# Patient Record
Sex: MALE | Race: BLACK OR AFRICAN AMERICAN | Employment: OTHER | URBAN - METROPOLITAN AREA
[De-identification: names, ages, dates, MRNs, and addresses within clinical notes are randomized per-mention and may not be internally consistent; named-entity substitution may affect disease eponyms.]

---

## 2020-02-26 RX ORDER — NITROGLYCERIN 0.4 MG/1
TABLET SUBLINGUAL PRN
Refills: 0 | COMMUNITY
Start: 2019-11-27

## 2020-02-26 RX ORDER — HYDRALAZINE HYDROCHLORIDE 50 MG/1
TABLET, FILM COATED ORAL 3 TIMES DAILY
Refills: 0 | COMMUNITY
Start: 2019-11-29 | End: 2021-11-17

## 2020-02-26 RX ORDER — LIDOCAINE AND PRILOCAINE 25; 25 MG/G; MG/G
CREAM TOPICAL PRN
COMMUNITY
Start: 2016-09-22

## 2020-02-26 NOTE — PROGRESS NOTES
[address]  time___0830 per pt_________        Surgery time__1000__________    Take the following medications with a sip of water: Follow your MD/Surgeons pre-procedure instructions regarding your medications    Do not eat or drink anything after 12:00 midnight  except for your prep prior to your surgery. This includes water chewing gum, mints and ice chips. You may brush your teeth and gargle the morning of your surgery, but do not swallow the water     Please see your family doctor/pediatrician for a history and physical and/or concerning medications. Bring any test results/reports from your physicians office. If you are under the care of a heart doctor or specialist doctor, please be aware that you may be asked to them for clearance    You may be asked to stop blood thinners such as Coumadin, Plavix, Fragmin, Lovenox, etc., or any anti-inflammatories such as:  Aspirin, Ibuprofen, Advil, Naproxen prior to your surgery. We also ask that you stop any OTC medications such as fish oil, vitamin E, glucosamine, garlic, Multivitamins, COQ 10, etc.    We ask that you do not smoke 24 hours prior to surgery  We ask that you do not  drink any alcoholic beverages 24 hours prior to surgery     You must make arrangements for a responsible adult to take you home after your surgery. For your safety you will not be allowed to leave alone or drive yourself home. Your surgery will be cancelled if you do not have a ride home. Also for your safety, it is strongly suggested that someone stay with you the first 24 hours after your surgery. A parent or legal guardian must accompany a child scheduled for surgery and plan to stay at the hospital until the child is discharged. Please do not bring other children with you. For your comfort, please wear simple loose fitting clothing to the hospital.  Please do not bring valuables.     Do not wear any make-up or nail surgery.

## 2020-02-27 RX ORDER — MEGESTROL ACETATE 20 MG/1
20 TABLET ORAL DAILY
COMMUNITY

## 2020-02-27 RX ORDER — ATORVASTATIN CALCIUM 80 MG/1
80 TABLET, FILM COATED ORAL DAILY
COMMUNITY

## 2020-02-27 RX ORDER — CARVEDILOL 25 MG/1
25 TABLET ORAL 2 TIMES DAILY WITH MEALS
Status: ON HOLD | COMMUNITY
End: 2021-11-22 | Stop reason: HOSPADM

## 2020-02-27 RX ORDER — ISOSORBIDE MONONITRATE 30 MG/1
30 TABLET, EXTENDED RELEASE ORAL DAILY
COMMUNITY

## 2020-02-27 RX ORDER — ERYTHROMYCIN 5 MG/G
OINTMENT OPHTHALMIC NIGHTLY
COMMUNITY
End: 2021-11-17

## 2020-02-27 RX ORDER — CLOPIDOGREL BISULFATE 75 MG/1
75 TABLET ORAL DAILY
Status: ON HOLD | COMMUNITY
End: 2021-11-17 | Stop reason: ALTCHOICE

## 2020-02-27 RX ORDER — SPIRONOLACTONE 25 MG/1
25 TABLET ORAL DAILY
COMMUNITY

## 2020-02-27 NOTE — PROGRESS NOTES
Completed the pre-procedure interview, verified the patient's current medications.  Electronically signed by Melva Batres RN on 2/27/20 at 4:23 PM

## 2020-02-28 ENCOUNTER — ANESTHESIA EVENT (OUTPATIENT)
Dept: ENDOSCOPY | Age: 55
End: 2020-02-28
Payer: MEDICARE

## 2020-03-02 ENCOUNTER — ANESTHESIA (OUTPATIENT)
Dept: ENDOSCOPY | Age: 55
End: 2020-03-02
Payer: MEDICARE

## 2020-03-02 ENCOUNTER — HOSPITAL ENCOUNTER (OUTPATIENT)
Age: 55
Setting detail: OUTPATIENT SURGERY
Discharge: HOME | End: 2020-03-02
Attending: INTERNAL MEDICINE | Admitting: INTERNAL MEDICINE
Payer: MEDICARE

## 2020-03-02 VITALS
HEIGHT: 74 IN | WEIGHT: 137.68 LBS | OXYGEN SATURATION: 96 % | SYSTOLIC BLOOD PRESSURE: 152 MMHG | BODY MASS INDEX: 17.67 KG/M2 | HEART RATE: 78 BPM | DIASTOLIC BLOOD PRESSURE: 78 MMHG | TEMPERATURE: 97 F | RESPIRATION RATE: 18 BRPM

## 2020-03-02 VITALS
SYSTOLIC BLOOD PRESSURE: 115 MMHG | DIASTOLIC BLOOD PRESSURE: 63 MMHG | OXYGEN SATURATION: 100 % | RESPIRATION RATE: 16 BRPM

## 2020-03-02 LAB
ANION GAP SERPL CALCULATED.3IONS-SCNC: 17 MMOL/L (ref 3–16)
BUN BLDV-MCNC: 20 MG/DL (ref 7–20)
CALCIUM SERPL-MCNC: 9.8 MG/DL (ref 8.3–10.6)
CHLORIDE BLD-SCNC: 92 MMOL/L (ref 99–110)
CO2: 28 MMOL/L (ref 21–32)
CREAT SERPL-MCNC: 8.6 MG/DL (ref 0.9–1.3)
GFR AFRICAN AMERICAN: 8
GFR NON-AFRICAN AMERICAN: 6
GLUCOSE BLD-MCNC: 66 MG/DL (ref 70–99)
POTASSIUM REFLEX MAGNESIUM: 4.1 MMOL/L (ref 3.5–5.1)
SODIUM BLD-SCNC: 137 MMOL/L (ref 136–145)

## 2020-03-02 PROCEDURE — 88305 TISSUE EXAM BY PATHOLOGIST: CPT

## 2020-03-02 PROCEDURE — 2500000003 HC RX 250 WO HCPCS: Performed by: NURSE ANESTHETIST, CERTIFIED REGISTERED

## 2020-03-02 PROCEDURE — 6360000002 HC RX W HCPCS: Performed by: NURSE ANESTHETIST, CERTIFIED REGISTERED

## 2020-03-02 PROCEDURE — 7100000001 HC PACU RECOVERY - ADDTL 15 MIN: Performed by: INTERNAL MEDICINE

## 2020-03-02 PROCEDURE — 80048 BASIC METABOLIC PNL TOTAL CA: CPT

## 2020-03-02 PROCEDURE — 2709999900 HC NON-CHARGEABLE SUPPLY: Performed by: INTERNAL MEDICINE

## 2020-03-02 PROCEDURE — 36415 COLL VENOUS BLD VENIPUNCTURE: CPT

## 2020-03-02 PROCEDURE — 3700000001 HC ADD 15 MINUTES (ANESTHESIA): Performed by: INTERNAL MEDICINE

## 2020-03-02 PROCEDURE — 2580000003 HC RX 258: Performed by: ANESTHESIOLOGY

## 2020-03-02 PROCEDURE — 7100000000 HC PACU RECOVERY - FIRST 15 MIN: Performed by: INTERNAL MEDICINE

## 2020-03-02 PROCEDURE — 3700000000 HC ANESTHESIA ATTENDED CARE: Performed by: INTERNAL MEDICINE

## 2020-03-02 PROCEDURE — 7100000010 HC PHASE II RECOVERY - FIRST 15 MIN: Performed by: INTERNAL MEDICINE

## 2020-03-02 PROCEDURE — 7100000011 HC PHASE II RECOVERY - ADDTL 15 MIN: Performed by: INTERNAL MEDICINE

## 2020-03-02 PROCEDURE — 3609010600 HC COLONOSCOPY POLYPECTOMY SNARE/COLD BIOPSY: Performed by: INTERNAL MEDICINE

## 2020-03-02 RX ORDER — SODIUM CHLORIDE 0.9 % (FLUSH) 0.9 %
10 SYRINGE (ML) INJECTION PRN
Status: DISCONTINUED | OUTPATIENT
Start: 2020-03-02 | End: 2020-03-02 | Stop reason: HOSPADM

## 2020-03-02 RX ORDER — MORPHINE SULFATE 2 MG/ML
1 INJECTION, SOLUTION INTRAMUSCULAR; INTRAVENOUS EVERY 5 MIN PRN
Status: DISCONTINUED | OUTPATIENT
Start: 2020-03-02 | End: 2020-03-02 | Stop reason: HOSPADM

## 2020-03-02 RX ORDER — OXYCODONE HYDROCHLORIDE AND ACETAMINOPHEN 5; 325 MG/1; MG/1
1 TABLET ORAL PRN
Status: DISCONTINUED | OUTPATIENT
Start: 2020-03-02 | End: 2020-03-02 | Stop reason: HOSPADM

## 2020-03-02 RX ORDER — LIDOCAINE HYDROCHLORIDE 20 MG/ML
INJECTION, SOLUTION INFILTRATION; PERINEURAL PRN
Status: DISCONTINUED | OUTPATIENT
Start: 2020-03-02 | End: 2020-03-02 | Stop reason: SDUPTHER

## 2020-03-02 RX ORDER — PROPOFOL 10 MG/ML
INJECTION, EMULSION INTRAVENOUS CONTINUOUS PRN
Status: DISCONTINUED | OUTPATIENT
Start: 2020-03-02 | End: 2020-03-02 | Stop reason: SDUPTHER

## 2020-03-02 RX ORDER — MORPHINE SULFATE 2 MG/ML
2 INJECTION, SOLUTION INTRAMUSCULAR; INTRAVENOUS EVERY 5 MIN PRN
Status: DISCONTINUED | OUTPATIENT
Start: 2020-03-02 | End: 2020-03-02 | Stop reason: HOSPADM

## 2020-03-02 RX ORDER — SODIUM CHLORIDE 0.9 % (FLUSH) 0.9 %
10 SYRINGE (ML) INJECTION EVERY 12 HOURS SCHEDULED
Status: DISCONTINUED | OUTPATIENT
Start: 2020-03-02 | End: 2020-03-02 | Stop reason: HOSPADM

## 2020-03-02 RX ORDER — ONDANSETRON 2 MG/ML
4 INJECTION INTRAMUSCULAR; INTRAVENOUS
Status: DISCONTINUED | OUTPATIENT
Start: 2020-03-02 | End: 2020-03-02 | Stop reason: HOSPADM

## 2020-03-02 RX ORDER — MEPERIDINE HYDROCHLORIDE 25 MG/ML
12.5 INJECTION INTRAMUSCULAR; INTRAVENOUS; SUBCUTANEOUS EVERY 5 MIN PRN
Status: DISCONTINUED | OUTPATIENT
Start: 2020-03-02 | End: 2020-03-02 | Stop reason: HOSPADM

## 2020-03-02 RX ORDER — HYDRALAZINE HYDROCHLORIDE 20 MG/ML
5 INJECTION INTRAMUSCULAR; INTRAVENOUS
Status: DISCONTINUED | OUTPATIENT
Start: 2020-03-02 | End: 2020-03-02 | Stop reason: HOSPADM

## 2020-03-02 RX ORDER — SODIUM CHLORIDE 9 MG/ML
INJECTION, SOLUTION INTRAVENOUS CONTINUOUS
Status: DISCONTINUED | OUTPATIENT
Start: 2020-03-02 | End: 2020-03-02 | Stop reason: HOSPADM

## 2020-03-02 RX ORDER — LABETALOL HYDROCHLORIDE 5 MG/ML
5 INJECTION, SOLUTION INTRAVENOUS EVERY 10 MIN PRN
Status: DISCONTINUED | OUTPATIENT
Start: 2020-03-02 | End: 2020-03-02 | Stop reason: HOSPADM

## 2020-03-02 RX ORDER — OXYCODONE HYDROCHLORIDE AND ACETAMINOPHEN 5; 325 MG/1; MG/1
2 TABLET ORAL PRN
Status: DISCONTINUED | OUTPATIENT
Start: 2020-03-02 | End: 2020-03-02 | Stop reason: HOSPADM

## 2020-03-02 RX ORDER — PROPOFOL 10 MG/ML
INJECTION, EMULSION INTRAVENOUS PRN
Status: DISCONTINUED | OUTPATIENT
Start: 2020-03-02 | End: 2020-03-02 | Stop reason: SDUPTHER

## 2020-03-02 RX ADMIN — SODIUM CHLORIDE: 9 INJECTION, SOLUTION INTRAVENOUS at 09:18

## 2020-03-02 RX ADMIN — PROPOFOL 60 MG: 10 INJECTION, EMULSION INTRAVENOUS at 10:00

## 2020-03-02 RX ADMIN — LIDOCAINE HYDROCHLORIDE 60 MG: 20 INJECTION, SOLUTION INFILTRATION; PERINEURAL at 10:00

## 2020-03-02 RX ADMIN — PROPOFOL 160 MCG/KG/MIN: 10 INJECTION, EMULSION INTRAVENOUS at 10:00

## 2020-03-02 RX ADMIN — PROPOFOL 40 MG: 10 INJECTION, EMULSION INTRAVENOUS at 10:11

## 2020-03-02 ASSESSMENT — PAIN SCALES - GENERAL
PAINLEVEL_OUTOF10: 0

## 2020-03-02 ASSESSMENT — PULMONARY FUNCTION TESTS
PIF_VALUE: 0

## 2020-03-02 ASSESSMENT — PAIN - FUNCTIONAL ASSESSMENT: PAIN_FUNCTIONAL_ASSESSMENT: 0-10

## 2020-03-02 ASSESSMENT — ENCOUNTER SYMPTOMS: SHORTNESS OF BREATH: 0

## 2020-03-02 NOTE — ANESTHESIA PRE PROCEDURE
[facility] Department of Anesthesiology  Pre-Anesthesia Evaluation/Consultation       Name:  Elida Draper  : 1965  Age:  47 y. o. MRN:  [id number]  Date: 3/2/2020           Surgeon: Surgeon(s):  Willow Kwong MD    Procedure: Procedure(s):  COLONOSCOPY     No Known Allergies  Patient Active Problem List   Diagnosis    GERD (gastroesophageal reflux disease)    Anger    Depression    [facility]    S/P kidney transplant    Memory loss    Irritability    Abnormal weight loss    Erectile dysfunction    Decreased libido    Anemia    Folic acid deficiency    Need for SBE (subacute bacterial endocarditis) prophylaxis    Memory loss    Chest pain    Acute kidney injury ([facility]    Abdominal pain    Nausea    Diarrhea    Mesenteric ischemia (HCC)    Exertional angina (HCC)    Nonerosive nonspecific gastritis    Gastroparesis    Paraesophageal hiatal hernia    Lower urinary tract symptoms    ESRD (end stage renal disease) on dialysis (HCC)    Hand injury    Toe injury    Fracture of metacarpal neck of right hand, closed    Bilateral low back pain with sciatica    Insomnia    Sty    Essential hypertension     Past Medical History:   Diagnosis Date    Abnormal weight loss 2012    Abnormal weight loss 2012    Anemia 2012    Anger     Buerger's disease ([facility]     ? ?    CAD (coronary artery disease)     CHF (congestive heart failure) (HCC)     Chronic kidney disease, stage IV (severe) (HCC)     Chronic kidney disease, stage V (HCC)     Decreased libido 2012    Depression     DJD (degenerative joint disease) of cervical spine 11    MRI - advanced disc degeneration C3-4,5-6 spinal cord compression, C6-7 disc protusion    DJD (degenerative joint disease), lumbosacral 11    MRI - disc herniation L4-5, L3,4,5 nerve root involvement    Erectile dysfunction 2012    ESRD     ESRD (end 0902  SpO2  Av %  Min: 97 %   Min taken time: 20 0902  Max: 97 %   Max taken time: 20 0902  BP Readings from Last 3 Encounters:   20 (!) 148/89   19 (!) 153/90   18 (!) 160/105       BMI  Body mass index is 17.68 kg/m². Estimated body mass index is 17.68 kg/m² as calculated from the following:    Height as of this encounter: 6' 2\" (1.88 m). Weight as of this encounter: 137 lb 10.8 oz (62.5 kg). CBC   Lab Results   Component Value Date    WBC 10.8 2016    RBC 3.63 2016    HGB 12.2 2016    HCT 36.7 2016    .6 2016    RDW 17.3 2016     2016     CMP    Lab Results   Component Value Date     2016    K 4.0 2016     2016    CO2 23 2016    BUN 48 2016    CREATININE 8.01 2016    GFRAA 9 2016    GFRAA 22 2012    AGRATIO 1.3 2016    LABGLOM 7 2016    GLUCOSE 65 2016    PROT 7.0 2016    PROT 7.0 2012    CALCIUM 8.9 2016    BILITOT 0.5 2016    ALKPHOS 80 2016    AST 15 2016    ALT 12 2016     BMP    Lab Results   Component Value Date     2016    K 4.0 2016     2016    CO2 23 2016    BUN 48 2016    CREATININE 8.01 2016    CALCIUM 8.9 2016    GFRAA 9 2016    GFRAA 22 2012    LABGLOM 7 2016    GLUCOSE 65 2016     POCGlucose  No results for input(s): GLUCOSE in the last 72 hours.    Coags    Lab Results   Component Value Date    PROTIME 10.8 2016    INR 1.0 2016    APTT 34.5      HCG (If Applicable) No results found for: PREGTESTUR, PREGSERUM, HCG, HCGQUANT   ABGs No results found for: PHART, PO2ART, VPT3EXQ, VPU6JGK, BEART, Z1OUFQWQ   Type & Screen (If Applicable)  Lab Results   Component Value Date    LABABO O 2016    LABRH Positive 2016                            BMI: Wt Readings from Last 3 Encounters: NPO Status:   Date of last liquid consumption: 03/02/20   Time of last liquid consumption: 0200(prep)   Date of last solid food consumption: 02/29/20      Time of last solid consumption: 1900       Anesthesia Evaluation  Patient summary reviewed  Airway: Mallampati: II  TM distance: >3 FB   Neck ROM: full  Mouth opening: > = 3 FB Dental:          Pulmonary: breath sounds clear to auscultation      (-) COPD, asthma and shortness of breath                           Cardiovascular:    (+) hypertension:, angina:, past MI:, CAD:, CHF:,     (-) valvular problems/murmurs, CABG/stent and dysrhythmias      Rhythm: regular  Rate: normal                    Neuro/Psych:   (+) neuromuscular disease:, psychiatric history:   (-) seizures, TIA and CVA           GI/Hepatic/Renal:   (+) hiatal hernia, GERD:, renal disease: ESRD,      (-) PUD and liver disease       Endo/Other:    (+) hypothyroidism::., .    (-) diabetes mellitus               Abdominal:           Vascular: negative vascular ROS. Anesthesia Plan      MAC     ASA 3     (I discussed local anesthesia with intravenous sedation to the   patient's satisfaction and agreed including risks and alternatives. The  patient has no further questions. ANNABEL STEPHENS )  Induction: intravenous. Anesthetic plan and risks discussed with patient. Plan discussed with CRNA. This pre-anesthesia assessment may be used as a history and physical.    DOS STAFF ADDENDUM:    Pt seen and examined, chart reviewed (including anesthesia, drug and allergy history). No interval changes to history and physical examination. Anesthetic plan, risks, benefits, alternatives, and personnel involved discussed with patient. Patient verbalized an understanding and agrees to proceed.       Ashley Sutton MD  March 2, 2020  9:17 AM

## 2020-03-02 NOTE — ANESTHESIA POSTPROCEDURE EVALUATION
Department of Anesthesiology  Postprocedure Note    Patient: Fany Mack  MRN: [id number]  YOB: 1965  Date of evaluation: 3/2/2020  Time:  11:32 AM     Procedure Summary     Date:  03/02/20 Room / Location:  [address] [place] [address]    Anesthesia Start:  8757 Anesthesia Stop:  3397    Procedure:  COLONOSCOPY POLYPECTOMY SNARE/COLD BIOPSY (N/A ) Diagnosis:       Weight loss      (WEIGHT LOSS)    Surgeon:  Tracy Carr MD Responsible Provider:  Marleni Garcia MD    Anesthesia Type:  MAC ASA Status:  3          Anesthesia Type: MAC    Martita Phase I: Martita Score: 9    Martita Phase II: Martita Score: 10    Last vitals: Reviewed and per EMR flowsheets.        Anesthesia Post Evaluation    Patient location during evaluation: PACU  Level of consciousness: awake and alert  Airway patency: patent  Nausea & Vomiting: no nausea and no vomiting  Complications: no  Cardiovascular status: blood pressure returned to baseline  Respiratory status: acceptable  Hydration status: euvolemic  Comments: Postoperative Anesthesia Note    Name:    Fany Mack  MRN:      [id number]    Patient Vitals in the past 12 hrs:  03/02/20 1110, BP:(!) 165/85, Temp:97 °F (36.1 °C), Temp src:Temporal, Pulse:77, Resp:16, SpO2:96 %  03/02/20 1105, Temp:97 °F (36.1 °C), Pulse:66, Resp:15  03/02/20 1102, BP:(!) 145/77, Pulse:69, Resp:16  03/02/20 1056, BP:(!) 146/74, Pulse:65, Resp:20, SpO2:100 %  03/02/20 1051, BP:135/74, Pulse:62, Resp:16, SpO2:100 %  03/02/20 1047, BP:126/67, Pulse:62, Resp:16, SpO2:100 %  03/02/20 1042, BP:126/65, Pulse:62, Resp:15, SpO2:100 %  03/02/20 1036, BP:121/67, Temp:97.6 °F (36.4 °C), Temp src:Temporal, Pulse:66, Resp:14, SpO2:99 %  03/02/20 0902, BP:(!) 148/89, Temp:97.4 °F (36.3 °C), Temp src:Temporal, Pulse:72, Resp:17, SpO2:97 %, Height:6' 2\" (1.88 m), Weight:137 lb 10.8 oz (62.5 kg)     LABS:    CBC  Lab Results       Component                Value
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
## 2020-03-02 NOTE — OP NOTE
The scope was then withdrawn back through the cecum, ascending, transverse, descending, sigmoid colon, and rectum. Careful circumferential examination of the mucosa in these areas demonstrated:    1. A 3 mm polyp in the ascending colon removed completely with biopsy polypectomy  2. A 3 mm polyp in the ascending colon removed completely with biopsy polypectomy  3. A 4 mm polyp in the ascending colon was removed completely with cold snare polypectomy. 4. A 3 mm polyp in the transverse colon removed completely with biopsy polypectomy. 5. A 4 mm polyp in the transverse colon removed completely with cold snare polypectomy  6. Several small mouthed diverticulum present in the left colon. The scope was then withdrawn into the rectum and retroflexed. The retroflexed view of the anal verge and rectum demonstrates normal rectum. The scope was straightened, the colon was decompressed and the scope was withdrawn from the patient. The patient tolerated the procedure well and was taken to the PACU in good condition. Estimated blood loss: < 5 CC     Impression:  See post-procedure diagnoses. Recommendations:  1. Await pathology results. 2. Recommend repeat colonoscopy in 3 years for surveillance purposes. 3. Recommend restarting Plavix in 24 hours. 4. The patient had biopsies taken today. The patient should call for results in 7 days if they have not heard from our office. Our number is [phone].     TABITHA [address]  3/2/2020  [phone]

## 2021-11-17 ENCOUNTER — APPOINTMENT (OUTPATIENT)
Dept: GENERAL RADIOLOGY | Age: 56
DRG: 291 | End: 2021-11-17
Payer: MEDICARE

## 2021-11-17 ENCOUNTER — HOSPITAL ENCOUNTER (INPATIENT)
Age: 56
LOS: 5 days | Discharge: HOME | DRG: 291 | End: 2021-11-22
Attending: EMERGENCY MEDICINE | Admitting: INTERNAL MEDICINE
Payer: MEDICARE

## 2021-11-17 ENCOUNTER — APPOINTMENT (OUTPATIENT)
Dept: CT IMAGING | Age: 56
DRG: 291 | End: 2021-11-17
Payer: MEDICARE

## 2021-11-17 DIAGNOSIS — N18.6 ESRD (END STAGE RENAL DISEASE) (HCC): ICD-10-CM

## 2021-11-17 DIAGNOSIS — J90 PLEURAL EFFUSION: ICD-10-CM

## 2021-11-17 DIAGNOSIS — R40.4 TRANSIENT ALTERATION OF AWARENESS: Primary | ICD-10-CM

## 2021-11-17 LAB
A/G RATIO: 0.9 (ref 1.1–2.2)
ALBUMIN SERPL-MCNC: 3 G/DL (ref 3.4–5)
ALP BLD-CCNC: 129 U/L (ref 40–129)
ALT SERPL-CCNC: 59 U/L (ref 10–40)
AMMONIA: 24 UMOL/L (ref 16–60)
ANION GAP SERPL CALCULATED.3IONS-SCNC: 14 MMOL/L (ref 3–16)
ANISOCYTOSIS: ABNORMAL
AST SERPL-CCNC: 22 U/L (ref 15–37)
BASOPHILS ABSOLUTE: 0 K/UL (ref 0–0.2)
BASOPHILS RELATIVE PERCENT: 0.9 %
BILIRUB SERPL-MCNC: 0.3 MG/DL (ref 0–1)
BUN BLDV-MCNC: 37 MG/DL (ref 7–20)
CALCIUM SERPL-MCNC: 9.4 MG/DL (ref 8.3–10.6)
CHLORIDE BLD-SCNC: 97 MMOL/L (ref 99–110)
CO2: 28 MMOL/L (ref 21–32)
CREAT SERPL-MCNC: 7.4 MG/DL (ref 0.9–1.3)
EKG ATRIAL RATE: 89 BPM
EKG DIAGNOSIS: NORMAL
EKG P AXIS: 73 DEGREES
EKG P-R INTERVAL: 196 MS
EKG Q-T INTERVAL: 424 MS
EKG QRS DURATION: 194 MS
EKG QTC CALCULATION (BAZETT): 515 MS
EKG R AXIS: 122 DEGREES
EKG T AXIS: 56 DEGREES
EKG VENTRICULAR RATE: 89 BPM
EOSINOPHILS ABSOLUTE: 0.2 K/UL (ref 0–0.6)
EOSINOPHILS RELATIVE PERCENT: 5 %
GFR AFRICAN AMERICAN: 9
GFR NON-AFRICAN AMERICAN: 8
GLUCOSE BLD-MCNC: 82 MG/DL (ref 70–99)
HCT VFR BLD CALC: 25.2 % (ref 40.5–52.5)
HEMATOLOGY PATH CONSULT: YES
HEMOGLOBIN: 8.2 G/DL (ref 13.5–17.5)
LACTIC ACID: 1.6 MMOL/L (ref 0.4–2)
LYMPHOCYTES ABSOLUTE: 1.2 K/UL (ref 1–5.1)
LYMPHOCYTES RELATIVE PERCENT: 24.2 %
MACROCYTES: ABNORMAL
MCH RBC QN AUTO: 37.6 PG (ref 26–34)
MCHC RBC AUTO-ENTMCNC: 32.5 G/DL (ref 31–36)
MCV RBC AUTO: 115.6 FL (ref 80–100)
MONOCYTES ABSOLUTE: 0.5 K/UL (ref 0–1.3)
MONOCYTES RELATIVE PERCENT: 10.2 %
NEUTROPHILS ABSOLUTE: 3 K/UL (ref 1.7–7.7)
NEUTROPHILS RELATIVE PERCENT: 59.7 %
OVALOCYTES: ABNORMAL
PDW BLD-RTO: 20.4 % (ref 12.4–15.4)
PLATELET # BLD: 171 K/UL (ref 135–450)
PLATELET SLIDE REVIEW: ADEQUATE
PMV BLD AUTO: 8 FL (ref 5–10.5)
POTASSIUM REFLEX MAGNESIUM: 4 MMOL/L (ref 3.5–5.1)
PRO-BNP: ABNORMAL PG/ML (ref 0–124)
RBC # BLD: 2.18 M/UL (ref 4.2–5.9)
SLIDE REVIEW: ABNORMAL
SODIUM BLD-SCNC: 139 MMOL/L (ref 136–145)
TOTAL PROTEIN: 6.3 G/DL (ref 6.4–8.2)
TROPONIN: 0.15 NG/ML
WBC # BLD: 5 K/UL (ref 4–11)

## 2021-11-17 PROCEDURE — 93005 ELECTROCARDIOGRAM TRACING: CPT | Performed by: EMERGENCY MEDICINE

## 2021-11-17 PROCEDURE — 82140 ASSAY OF AMMONIA: CPT

## 2021-11-17 PROCEDURE — 02HV33Z INSERTION OF INFUSION DEVICE INTO SUPERIOR VENA CAVA, PERCUTANEOUS APPROACH: ICD-10-PCS | Performed by: EMERGENCY MEDICINE

## 2021-11-17 PROCEDURE — 87040 BLOOD CULTURE FOR BACTERIA: CPT

## 2021-11-17 PROCEDURE — 6370000000 HC RX 637 (ALT 250 FOR IP): Performed by: INTERNAL MEDICINE

## 2021-11-17 PROCEDURE — 84484 ASSAY OF TROPONIN QUANT: CPT

## 2021-11-17 PROCEDURE — 1200000000 HC SEMI PRIVATE

## 2021-11-17 PROCEDURE — P9047 ALBUMIN (HUMAN), 25%, 50ML: HCPCS | Performed by: HOSPITALIST

## 2021-11-17 PROCEDURE — 5A1D70Z PERFORMANCE OF URINARY FILTRATION, INTERMITTENT, LESS THAN 6 HOURS PER DAY: ICD-10-PCS | Performed by: INTERNAL MEDICINE

## 2021-11-17 PROCEDURE — 80053 COMPREHEN METABOLIC PANEL: CPT

## 2021-11-17 PROCEDURE — 71045 X-RAY EXAM CHEST 1 VIEW: CPT

## 2021-11-17 PROCEDURE — 83605 ASSAY OF LACTIC ACID: CPT

## 2021-11-17 PROCEDURE — 93010 ELECTROCARDIOGRAM REPORT: CPT | Performed by: INTERNAL MEDICINE

## 2021-11-17 PROCEDURE — 36556 INSERT NON-TUNNEL CV CATH: CPT

## 2021-11-17 PROCEDURE — 6360000002 HC RX W HCPCS: Performed by: HOSPITALIST

## 2021-11-17 PROCEDURE — 83880 ASSAY OF NATRIURETIC PEPTIDE: CPT

## 2021-11-17 PROCEDURE — 99284 EMERGENCY DEPT VISIT MOD MDM: CPT

## 2021-11-17 PROCEDURE — 90935 HEMODIALYSIS ONE EVALUATION: CPT

## 2021-11-17 PROCEDURE — 2580000003 HC RX 258: Performed by: INTERNAL MEDICINE

## 2021-11-17 PROCEDURE — 6370000000 HC RX 637 (ALT 250 FOR IP): Performed by: HOSPITALIST

## 2021-11-17 PROCEDURE — 36415 COLL VENOUS BLD VENIPUNCTURE: CPT

## 2021-11-17 PROCEDURE — 85025 COMPLETE CBC W/AUTO DIFF WBC: CPT

## 2021-11-17 PROCEDURE — 70450 CT HEAD/BRAIN W/O DYE: CPT

## 2021-11-17 RX ORDER — SODIUM CHLORIDE 0.9 % (FLUSH) 0.9 %
10 SYRINGE (ML) INJECTION PRN
Status: DISCONTINUED | OUTPATIENT
Start: 2021-11-17 | End: 2021-11-22 | Stop reason: HOSPADM

## 2021-11-17 RX ORDER — LEVOTHYROXINE SODIUM 0.1 MG/1
100 TABLET ORAL DAILY
Status: DISCONTINUED | OUTPATIENT
Start: 2021-11-18 | End: 2021-11-22 | Stop reason: HOSPADM

## 2021-11-17 RX ORDER — LANOLIN ALCOHOL/MO/W.PET/CERES
1000 CREAM (GRAM) TOPICAL DAILY
Status: DISCONTINUED | OUTPATIENT
Start: 2021-11-18 | End: 2021-11-22 | Stop reason: HOSPADM

## 2021-11-17 RX ORDER — ONDANSETRON 2 MG/ML
4 INJECTION INTRAMUSCULAR; INTRAVENOUS EVERY 6 HOURS PRN
Status: DISCONTINUED | OUTPATIENT
Start: 2021-11-17 | End: 2021-11-22 | Stop reason: HOSPADM

## 2021-11-17 RX ORDER — MAGNESIUM SULFATE IN WATER 40 MG/ML
2000 INJECTION, SOLUTION INTRAVENOUS PRN
Status: DISCONTINUED | OUTPATIENT
Start: 2021-11-17 | End: 2021-11-17

## 2021-11-17 RX ORDER — SACUBITRIL AND VALSARTAN 24; 26 MG/1; MG/1
1 TABLET, FILM COATED ORAL 2 TIMES DAILY
COMMUNITY

## 2021-11-17 RX ORDER — MIDODRINE HYDROCHLORIDE 5 MG/1
5 TABLET ORAL
Status: DISCONTINUED | OUTPATIENT
Start: 2021-11-18 | End: 2021-11-22 | Stop reason: HOSPADM

## 2021-11-17 RX ORDER — ACETAMINOPHEN 650 MG/1
650 SUPPOSITORY RECTAL EVERY 6 HOURS PRN
Status: DISCONTINUED | OUTPATIENT
Start: 2021-11-17 | End: 2021-11-22 | Stop reason: HOSPADM

## 2021-11-17 RX ORDER — MEGESTROL ACETATE 40 MG/1
20 TABLET ORAL DAILY
Status: DISCONTINUED | OUTPATIENT
Start: 2021-11-18 | End: 2021-11-22 | Stop reason: HOSPADM

## 2021-11-17 RX ORDER — FOLIC ACID 1 MG/1
1 TABLET ORAL DAILY
COMMUNITY

## 2021-11-17 RX ORDER — GABAPENTIN 100 MG/1
100 CAPSULE ORAL 2 TIMES DAILY
Status: DISCONTINUED | OUTPATIENT
Start: 2021-11-17 | End: 2021-11-22 | Stop reason: HOSPADM

## 2021-11-17 RX ORDER — PREDNISONE 1 MG/1
5 TABLET ORAL EVERY OTHER DAY
Status: DISCONTINUED | OUTPATIENT
Start: 2021-11-19 | End: 2021-11-22 | Stop reason: HOSPADM

## 2021-11-17 RX ORDER — WARFARIN SODIUM 5 MG/1
5 TABLET ORAL DAILY
COMMUNITY
End: 2021-12-21 | Stop reason: SDUPTHER

## 2021-11-17 RX ORDER — MIDODRINE HYDROCHLORIDE 10 MG/1
10 TABLET ORAL ONCE
Status: COMPLETED | OUTPATIENT
Start: 2021-11-17 | End: 2021-11-17

## 2021-11-17 RX ORDER — ASPIRIN 81 MG/1
81 TABLET, CHEWABLE ORAL DAILY
Status: DISCONTINUED | OUTPATIENT
Start: 2021-11-18 | End: 2021-11-22 | Stop reason: HOSPADM

## 2021-11-17 RX ORDER — ONDANSETRON 4 MG/1
4 TABLET, ORALLY DISINTEGRATING ORAL EVERY 8 HOURS PRN
Status: DISCONTINUED | OUTPATIENT
Start: 2021-11-17 | End: 2021-11-22 | Stop reason: HOSPADM

## 2021-11-17 RX ORDER — POTASSIUM CHLORIDE 7.45 MG/ML
10 INJECTION INTRAVENOUS PRN
Status: DISCONTINUED | OUTPATIENT
Start: 2021-11-17 | End: 2021-11-17

## 2021-11-17 RX ORDER — GABAPENTIN 100 MG/1
100 CAPSULE ORAL 2 TIMES DAILY
COMMUNITY

## 2021-11-17 RX ORDER — ACETAMINOPHEN 325 MG/1
650 TABLET ORAL EVERY 6 HOURS PRN
Status: DISCONTINUED | OUTPATIENT
Start: 2021-11-17 | End: 2021-11-22 | Stop reason: HOSPADM

## 2021-11-17 RX ORDER — SODIUM CHLORIDE 0.9 % (FLUSH) 0.9 %
10 SYRINGE (ML) INJECTION EVERY 12 HOURS SCHEDULED
Status: DISCONTINUED | OUTPATIENT
Start: 2021-11-17 | End: 2021-11-22 | Stop reason: HOSPADM

## 2021-11-17 RX ORDER — ALBUMIN (HUMAN) 12.5 G/50ML
25 SOLUTION INTRAVENOUS
Status: COMPLETED | OUTPATIENT
Start: 2021-11-17 | End: 2021-11-17

## 2021-11-17 RX ORDER — LABETALOL HYDROCHLORIDE 5 MG/ML
10 INJECTION, SOLUTION INTRAVENOUS EVERY 4 HOURS PRN
Status: DISCONTINUED | OUTPATIENT
Start: 2021-11-17 | End: 2021-11-22 | Stop reason: HOSPADM

## 2021-11-17 RX ORDER — SEVELAMER CARBONATE 800 MG/1
800 TABLET, FILM COATED ORAL
Status: DISCONTINUED | OUTPATIENT
Start: 2021-11-18 | End: 2021-11-22 | Stop reason: HOSPADM

## 2021-11-17 RX ORDER — ATORVASTATIN CALCIUM 80 MG/1
80 TABLET, FILM COATED ORAL DAILY
Status: DISCONTINUED | OUTPATIENT
Start: 2021-11-17 | End: 2021-11-22 | Stop reason: HOSPADM

## 2021-11-17 RX ORDER — SODIUM CHLORIDE 9 MG/ML
25 INJECTION, SOLUTION INTRAVENOUS PRN
Status: DISCONTINUED | OUTPATIENT
Start: 2021-11-17 | End: 2021-11-22 | Stop reason: HOSPADM

## 2021-11-17 RX ORDER — FOLIC ACID 1 MG/1
1 TABLET ORAL DAILY
Status: DISCONTINUED | OUTPATIENT
Start: 2021-11-18 | End: 2021-11-22 | Stop reason: HOSPADM

## 2021-11-17 RX ADMIN — GABAPENTIN 100 MG: 100 CAPSULE ORAL at 21:27

## 2021-11-17 RX ADMIN — ATORVASTATIN CALCIUM 80 MG: 80 TABLET, FILM COATED ORAL at 21:27

## 2021-11-17 RX ADMIN — ALBUMIN (HUMAN) 25 G: 0.25 INJECTION, SOLUTION INTRAVENOUS at 17:32

## 2021-11-17 RX ADMIN — MIDODRINE HYDROCHLORIDE 10 MG: 10 TABLET ORAL at 16:24

## 2021-11-17 RX ADMIN — SODIUM CHLORIDE, PRESERVATIVE FREE 10 ML: 5 INJECTION INTRAVENOUS at 21:39

## 2021-11-17 ASSESSMENT — ENCOUNTER SYMPTOMS
ABDOMINAL PAIN: 0
SHORTNESS OF BREATH: 0
RHINORRHEA: 0
COLOR CHANGE: 0
CHEST TIGHTNESS: 0
COUGH: 0
VOMITING: 0
NAUSEA: 0
EYES NEGATIVE: 1
SORE THROAT: 0
WHEEZING: 0
DIARRHEA: 0

## 2021-11-17 ASSESSMENT — PAIN SCALES - GENERAL
PAINLEVEL_OUTOF10: 0
PAINLEVEL_OUTOF10: 7

## 2021-11-17 ASSESSMENT — PAIN DESCRIPTION - PAIN TYPE: TYPE: CHRONIC PAIN

## 2021-11-17 ASSESSMENT — PAIN DESCRIPTION - ORIENTATION: ORIENTATION: RIGHT

## 2021-11-17 ASSESSMENT — PAIN DESCRIPTION - DESCRIPTORS: DESCRIPTORS: DISCOMFORT

## 2021-11-17 ASSESSMENT — PAIN DESCRIPTION - LOCATION: LOCATION: FOOT

## 2021-11-17 NOTE — ACP (ADVANCE CARE PLANNING)
Advance Care Planning     Advance Care Planning Activator (Inpatient)  Conversation Note      Date of ACP Conversation: 11/17/2021     Conversation Conducted with: Patient with Decision Making Capacity    ACP Activator: [address] [facility] [place] Decision Maker:     Current Designated Health Care Decision Maker:     Primary Decision Maker: Rama Bronson - [phone]    Secondary Decision Maker: Parker Aburto - Brother/Sister - [phone]    Today we documented Decision Maker(s) consistent with Legal Next of Kin hierarchy. Care Preferences    Ventilation: \"If you were in your present state of health and suddenly became very ill and were unable to breathe on your own, what would your preference be about the use of a ventilator (breathing machine) if it were available to you? \"      Would the patient desire the use of ventilator (breathing machine)?: yes    \"If your health worsens and it becomes clear that your chance of recovery is unlikely, what would your preference be about the use of a ventilator (breathing machine) if it were available to you? \"     Would the patient desire the use of ventilator (breathing machine)?: Yes      Resuscitation  \"CPR works best to restart the heart when there is a sudden event, like a heart attack, in someone who is otherwise healthy. Unfortunately, CPR does not typically restart the heart for people who have serious health conditions or who are very sick. \"    \"In the event your heart stopped as a result of an underlying serious health condition, would you want attempts to be made to restart your heart (answer \"yes\" for attempt to resuscitate) or would you prefer a natural death (answer \"no\" for do not attempt to resuscitate)? \" yes       [x] Yes   [] No   Educated Patient / Benjamin Milligan regarding differences between Advance Directives and portable DNR orders.     Length of ACP Conversation in minutes: 5 minutes     Conversation Outcomes:  [x] ACP discussion
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
## 2021-11-17 NOTE — CONSULTS
Consult received  Full note to follow    Js Antony MD  11/17/2021    [facility] [address]  Office : [phone]  Fax :[phone]

## 2021-11-17 NOTE — PROGRESS NOTES
Pt transferred from ED. Pt A&O. Wife at bedside. Pt transferred to dialysis as soon as pt got to room 4260.

## 2021-11-17 NOTE — PROGRESS NOTES
Medication Reconciliation    List of medications for Rose Mary Domínguez is currently taking is complete. Source of Information:   1. Epic records  2. Conversation with patient and spouse at bedside     Allergies   Allergy list reviewed, no new allergies added   Allergies listed in Epic as follows: Patient has no known allergies. Notes Regarding Home Medications:     1. per his discharge med list from [facility] - he was taken off plavix given that there no was no indication to resume plavix   2. Patient is prescribed warfarin with a goal INR of 2.0 - 3.0  (2.5-3 documented in note). Patient has 4 mg and 1 mg tablets and their current dose is 5 mg daily. Last dose taken on 11/16. Taking for recent CVA embolic; source likely left atrial appendage.  2/2 to severe mitral stenosis and markedly depressed left atrial appendage velocities even without Afib per notes        Mariah CastroD Candidate   11/17/2021 3:37 PM

## 2021-11-17 NOTE — CARE COORDINATION
INITIAL CASE MANAGEMENT ASSESSMENT    Reviewed chart, met with patient and wife Ye to assess possible discharge needs. Explained Case Management role/services. Living Situation: Patient lives in a 2 story house with his wife, and 2 dogs. 2 +1 EVITA from the front door, and 12 steps to get to the bedroom. ADLs: Independent     DME: Shower chair, walker, cane, rail in shower    PT/OT Recs: TBD     Active Services: [facility]- therapy once a week- therapy saw patient today (11-). Transportation: Non active- patients wife transports when needed- Patients wife will transport home when medically ready. Medications: [facility] on [address]    PCP: Dr. Bailey Lima      HD/PD: Domo Garrison in [facility] DR STARR FOLEY M/W/F 2:30-7/7:30. PLAN/COMMENTS: Patient is to return home with wife's support. No other known discharge needs at this time. SNF and Home Care lists provided      The Plan for Transition of Care is related to the following treatment goals: Return Home    The Patient and wife Katerine Dailey was provided with a choice of provider and agrees   with the discharge plan. [x] Yes [] No    Freedom of choice list was provided with basic dialogue that supports the patient's individualized plan of care/goals, treatment preferences and shares the quality data associated with the providers. [x] Yes [] No    SW/CM provided contact information for patient or family to call with any questions. SW/CM will follow and assist as needed.     ACP Complete

## 2021-11-18 LAB
ANION GAP SERPL CALCULATED.3IONS-SCNC: 12 MMOL/L (ref 3–16)
BUN BLDV-MCNC: 23 MG/DL (ref 7–20)
CALCIUM SERPL-MCNC: 9.2 MG/DL (ref 8.3–10.6)
CHLORIDE BLD-SCNC: 94 MMOL/L (ref 99–110)
CO2: 29 MMOL/L (ref 21–32)
CREAT SERPL-MCNC: 4.8 MG/DL (ref 0.9–1.3)
GFR AFRICAN AMERICAN: 15
GFR NON-AFRICAN AMERICAN: 13
GLUCOSE BLD-MCNC: 72 MG/DL (ref 70–99)
HCT VFR BLD CALC: 24.9 % (ref 40.5–52.5)
HEMATOLOGY PATH CONSULT: NO
HEMATOLOGY PATH CONSULT: NORMAL
HEMOGLOBIN: 8.2 G/DL (ref 13.5–17.5)
MCH RBC QN AUTO: 37.6 PG (ref 26–34)
MCHC RBC AUTO-ENTMCNC: 33 G/DL (ref 31–36)
MCV RBC AUTO: 114 FL (ref 80–100)
PDW BLD-RTO: 21.1 % (ref 12.4–15.4)
PLATELET # BLD: 181 K/UL (ref 135–450)
PMV BLD AUTO: 8.2 FL (ref 5–10.5)
POTASSIUM REFLEX MAGNESIUM: 4.5 MMOL/L (ref 3.5–5.1)
RBC # BLD: 2.19 M/UL (ref 4.2–5.9)
SODIUM BLD-SCNC: 135 MMOL/L (ref 136–145)
WBC # BLD: 5.2 K/UL (ref 4–11)

## 2021-11-18 PROCEDURE — 97165 OT EVAL LOW COMPLEX 30 MIN: CPT

## 2021-11-18 PROCEDURE — 97162 PT EVAL MOD COMPLEX 30 MIN: CPT

## 2021-11-18 PROCEDURE — 1200000000 HC SEMI PRIVATE

## 2021-11-18 PROCEDURE — 99223 1ST HOSP IP/OBS HIGH 75: CPT | Performed by: HOSPITALIST

## 2021-11-18 PROCEDURE — 85027 COMPLETE CBC AUTOMATED: CPT

## 2021-11-18 PROCEDURE — 80048 BASIC METABOLIC PNL TOTAL CA: CPT

## 2021-11-18 PROCEDURE — 97535 SELF CARE MNGMENT TRAINING: CPT

## 2021-11-18 PROCEDURE — 6370000000 HC RX 637 (ALT 250 FOR IP): Performed by: INTERNAL MEDICINE

## 2021-11-18 PROCEDURE — 2580000003 HC RX 258: Performed by: INTERNAL MEDICINE

## 2021-11-18 PROCEDURE — 36415 COLL VENOUS BLD VENIPUNCTURE: CPT

## 2021-11-18 PROCEDURE — 97530 THERAPEUTIC ACTIVITIES: CPT

## 2021-11-18 RX ADMIN — ATORVASTATIN CALCIUM 80 MG: 80 TABLET, FILM COATED ORAL at 08:28

## 2021-11-18 RX ADMIN — CYANOCOBALAMIN TAB 1000 MCG 1000 MCG: 1000 TAB at 08:28

## 2021-11-18 RX ADMIN — ASPIRIN 81 MG: 81 TABLET, CHEWABLE ORAL at 08:28

## 2021-11-18 RX ADMIN — GABAPENTIN 100 MG: 100 CAPSULE ORAL at 08:28

## 2021-11-18 RX ADMIN — FOLIC ACID 1 MG: 1 TABLET ORAL at 08:28

## 2021-11-18 RX ADMIN — SEVELAMER CARBONATE 800 MG: 800 TABLET, FILM COATED ORAL at 12:32

## 2021-11-18 RX ADMIN — SODIUM CHLORIDE, PRESERVATIVE FREE 10 ML: 5 INJECTION INTRAVENOUS at 20:27

## 2021-11-18 RX ADMIN — SEVELAMER CARBONATE 800 MG: 800 TABLET, FILM COATED ORAL at 08:28

## 2021-11-18 RX ADMIN — MIDODRINE HYDROCHLORIDE 5 MG: 5 TABLET ORAL at 12:32

## 2021-11-18 RX ADMIN — SEVELAMER CARBONATE 800 MG: 800 TABLET, FILM COATED ORAL at 17:03

## 2021-11-18 RX ADMIN — LEVOTHYROXINE SODIUM 100 MCG: 0.1 TABLET ORAL at 08:28

## 2021-11-18 RX ADMIN — SODIUM CHLORIDE, PRESERVATIVE FREE 10 ML: 5 INJECTION INTRAVENOUS at 09:13

## 2021-11-18 RX ADMIN — GABAPENTIN 100 MG: 100 CAPSULE ORAL at 20:26

## 2021-11-18 RX ADMIN — MIDODRINE HYDROCHLORIDE 5 MG: 5 TABLET ORAL at 17:03

## 2021-11-18 ASSESSMENT — PAIN SCALES - GENERAL
PAINLEVEL_OUTOF10: 0
PAINLEVEL_OUTOF10: 0

## 2021-11-18 NOTE — PROGRESS NOTES
Hospitalist Progress Note      PCP: Sarah Zuniga MD    Date of Admission: 11/17/2021      Subjective: Up to chair, denies chest pain shortness of breath nausea vomiting diarrhea. Denies palpitation denies dizziness at rest.  Wife at bedside      Medications:  Reviewed    Infusion Medications    sodium chloride       Scheduled Medications    sodium chloride flush  10 mL IntraVENous 2 times per day    aspirin  81 mg Oral Daily    atorvastatin  80 mg Oral Daily    cyanocobalamin  1,000 mcg Oral Daily    folic acid  1 mg Oral Daily    gabapentin  100 mg Oral BID    levothyroxine  100 mcg Oral Daily    megestrol  20 mg Oral Daily    [START ON 11/19/2021] predniSONE  5 mg Oral Every Other Day    sevelamer  800 mg Oral TID WC    midodrine  5 mg Oral TID WC     PRN Meds: sodium chloride flush, sodium chloride, ondansetron **OR** ondansetron, acetaminophen **OR** acetaminophen, labetalol      Intake/Output Summary (Last 24 hours) at 11/18/2021 1350  Last data filed at 11/17/2021 2000  Gross per 24 hour   Intake 400 ml   Output 2977 ml   Net -2577 ml       Physical Exam Performed:    /75   Pulse 80   Temp 97.5 °F (36.4 °C) (Oral)   Resp 16   Ht 6' 2\" (1.88 m)   Wt 149 lb 4 oz (67.7 kg)   SpO2 98%   BMI 19.16 kg/m²     General appearance: No apparent distress  Respiratory:  Normal respiratory effort. Clear to auscultation, bilaterally without Rales/Wheezes/Rhonchi. Cardiovascular: Diastolic murmur  Abdomen: Soft, non-tender, non-distended  Musculoskeletal: No clubbing, cyanosis   Skin: Skin color, texture, turgor normal.  No rashes or lesions.   Neurologic:  No focal weakness  Psychiatric: Alert and oriented  Capillary Refill: Brisk,3 seconds, normal   Peripheral Pulses: +2 palpable, equal bilaterally       Labs:   Recent Labs     11/17/21  1612 11/18/21  0625   WBC 5.0 5.2   HGB 8.2* 8.2*   HCT 25.2* 24.9*    181     Recent Labs     11/17/21  1253 11/18/21  0625    135*   K 4.0 4.5   CL 97* 94*   CO2 28 29   BUN 37* 23*   CREATININE 7.4* 4.8*   CALCIUM 9.4 9.2     Recent Labs     11/17/21  1253   AST 22   ALT 59*   BILITOT 0.3   ALKPHOS 129     No results for input(s): INR in the last 72 hours. Recent Labs     11/17/21  1253   TROPONINI 0.15*       Urinalysis:      Lab Results   Component Value Date    NITRU Negative 07/21/2016    WBCUA 0-3 06/25/2012    RBCUA <1 /HPF 07/21/2016    BLOODU TRACE 06/25/2012    SPECGRAV 1.014 07/21/2016    GLUCOSEU Trace 07/21/2016       Radiology:  CT HEAD WO CONTRAST   Final Result   Ill-defined hypodensity is seen in the right occipital lobe, new compared to   2012, likely sequelae from remote infarct. Linear areas of increased density   are seen along the cortical surface, likely laminar necrosis. XR CHEST PORTABLE   Final Result   1. Cardiomegaly with probable pulmonary venous hypertension   2. Right pleural effusion and/or pleural thickening                 Assessment/Plan:    Active Hospital Problems    Diagnosis     ESRD (end stage renal disease) ([facility] [address] [N18.6]      1. Acute metabolic encephalopathy, uremia in differential, nephrology consulted CT head with hypodensity in right occipital lobe new compared to 2012 but appears like a remote infarct. 2.  End-stage renal disease, nephrology consulted dialysis  3. Elevated troponin and BNP, likely related to end-stage renal disease. 4.  Anemia, appears of chronic disease, no signs of bleeding  5. Right pleural effusion, likely overall due to fluid overload. Dialysis  6. Severe mitral stenosis, plan for mechanical valve supposed to follow-up with cardiothoracic surgery next month, will consult cardiology for medication optimization      Diet: ADULT DIET; Regular; Low Sodium (2 gm); Low Potassium (Less than 3000 mg/day);  Low Phosphorus (Less than 1000 mg); 2000 ml  Code Status: Full Code      Enriqueta Lanes, MD

## 2021-11-18 NOTE — PROGRESS NOTES
Patient was in dialysis at the beginning of the shift. Patient arrived room at about from dialysis at about 2057. Patient alert and oriented. Patient oriented to room, bed at lowest position and locked. Call light use explained to patient and within reach. Admission orders explained to patient. Patient verbalized understanding. Telemetry monitor placed. Will continue to monitor.

## 2021-11-18 NOTE — PROGRESS NOTES
Occupational Therapy   Occupational Therapy Initial Assessment and Discharge Summary  Date: 2021   Patient Name: Gema De La Garza  MRN: [id number]     : 1965    Date of Service: 2021    Discharge Recommendations:  Home with assist PRN, Defer OT at this time       Assessment   Assessment: Pt presents with altered mental status but improved after dialysis. LIves at home with wife and was independent PTA. Currently without deficits in mobility or self care. D/C OT with D/C home with wife when medically stable. Discussed drivers eval for unrelated TIA earlier this month if the doctor desires him to be evaluated. Prognosis: Good  Decision Making: Low Complexity  History: see above  Exam: mobility, self care  Assistance / Modification: none  OT Education: OT Role; Plan of Care  REQUIRES OT FOLLOW UP: No  Activity Tolerance  Activity Tolerance: Patient Tolerated treatment well  Safety Devices  Safety Devices in place: Yes  Type of devices: Call light within reach; Left in bed; Nurse notified           Patient Diagnosis(es): The primary encounter diagnosis was Transient alteration of awareness. Diagnoses of ESRD (end stage renal disease) ([address] and Pleural effusion were also pertinent to this visit.      has a past medical history of Abnormal weight loss, Abnormal weight loss, Anemia, Anger, Buerger's disease (HCC), CAD (coronary artery disease), CHF (congestive heart failure) ([address] Chronic kidney disease, stage IV (severe) ([address] Chronic kidney disease, stage V ([address] Decreased libido, Depression, DJD (degenerative joint disease) of cervical spine, DJD (degenerative joint disease), lumbosacral, Erectile dysfunction, ESRD, ESRD (end stage renal disease) on dialysis ([address] Essential hypertension, Fecal incontinence, Folic acid deficiency, Fracture, Gastritis, Gastroparesis, GERD (gastroesophageal reflux disease), H/O mitral valve replacement, Hemodialysis patient ([address] Hypertension, Hypothyroidism, Iron deficiency anemia, Irritability, Keloids, Kidney transplants, Lower urinary tract symptoms, Memory loss, MI (myocardial infarction) ([facility] Nonerosive nonspecific gastritis, Paraesophageal hiatal hernia, S/P kidney transplant, Sigmoid polyp, Thyroid cancer ([facility] [address] and Thyroid nodules. has a past surgical history that includes Kidney transplant (1991); Mouth surgery (2008); Anus surgery (2000); Thyroidectomy (November 18, 2009); Colonoscopy (August 24, 2010); Upper gastrointestinal endoscopy (March 16, 2012); Kidney transplant (January 16, 2001); Tonsillectomy; fracture surgery; Dialysis fistula creation; Colonoscopy; Colonoscopy; Colonoscopy; Colonoscopy (October 17, 2013); Upper gastrointestinal endoscopy (October 17, 2013); Cystoscopy (February 2014); TURP (April 7, 2014); Upper gastrointestinal endoscopy (March 19, 2014); Dialysis fistula creation (July 2014); Colonoscopy (11/2016); Coronary angioplasty with stent (11/19/20); Cardiac defibrillator placement; Mitral valve repair; vascular surgery; and Colonoscopy (N/A, 3/2/2020). Restrictions       Subjective   General  Chart Reviewed: Yes, Orders, Progress Notes, History and Physical  Additional Pertinent Hx: Per Dr. Glenn Driscoll is a 59-year-old male with past medical history of CAD, ESRD who presented to hospital at the request of PCP for low BP. Patient seems to be drowsy during the interview, reportedly patient missed his dialysis today.   No reported fevers chills chest pain nausea vomiting diarrhea constipation dysuria\"  Family / Caregiver Present: No  Referring Practitioner: Dr. Yudi Vilchis  Diagnosis: ESRD  Subjective  Subjective: Pt seen in room, agreed to OT, no complaints  Pain Assessment  Pain Assessment: 0-10  Pain Level: 0  Vital Signs  Pulse: 88  BP: (!) 145/87  Social/Functional History  Social/Functional History  Lives With: Spouse  Type of Home: House  Home Layout: Two level, Bed/Bath upstairs, 1/2 bath on main level, Laundry in Plan  Times per week: NA         OutComes Score    [address] L[address] العراقي scored a 24/24 on the AM-PAC ADL Inpatient form. At this time, no further OT is recommended upon discharge due to independence. Recommend patient returns to prior setting with prior services. AM-PAC Score        AM-PAC Inpatient Daily Activity Raw Score: 24 (11/18/21 0902)  AM-PAC Inpatient ADL T-Scale Score : 57.54 (11/18/21 0902)  ADL Inpatient CMS 0-100% Score: 0 (11/18/21 0902)  ADL Inpatient CMS G-Code Modifier : [facility] (11/18/21 0902)    Goals  Short term goals  Time Frame for Short term goals: NA no OT goals identified  Patient Goals   Patient goals :  To return home asap       Therapy Time   Individual Concurrent Group Co-treatment   Time In 0800         Time Out 0830         Minutes 30         Timed Code Treatment Minutes: Momo 91, OT

## 2021-11-18 NOTE — FLOWSHEET NOTE
11/17/21 1641 11/17/21 2000   Treatment   Time On 1659  --    Time Off  --  2000   Treatment Goal 2500  --    Observations & Evaluations   Level of Consciousness Alert (0) Alert (0)   Oriented X  --  3   Heart Rhythm Regular  --    Respiratory Quality/Effort Unlabored  --    O2 Device None (Room air)  --    Bilateral Breath Sounds Diminished  --    Skin Color Appropriate for ethnicity  --    Skin Condition/Temp Dry; Warm  --    Abdomen Inspection Non-distended; Soft  --    Bowel Sounds (All Quadrants) Present  --    Edema None  --    Vital Signs   /69 133/64   Temp 98.5 °F (36.9 °C) 98.4 °F (36.9 °C)   Pulse 83 71   Resp 16 18   Weight 142 lb 3.2 oz (64.5 kg) 139 lb 1.8 oz (63.1 kg)   Weight Method Actual; Standing scale Bed scale   Percent Weight Change 0 -2.17   Pain Assessment   Pain Assessment 0-10 0-10   Pain Level 0 0   Technical Checks   Dialysis Machine No. [id number]  --    RO Machine No. [id number]  --    Dialyzer Lot No. [id number]  --    RO Machine Log Sheet Completed Yes  --    Machine Alarm Self Test Completed; Passed  --    Machine Autotest Completed; Passed  --    Air Foam Detector Tested; Proper Function  --    Extracorporeal Circuit Tested for Integrity Yes  --    Machine Conductivity 13.6  --    Machine Conductivity # 13.7  --    Manual Conductivity 13.4  --    Manual Ph 7.4  --    Bleach Test (Neg) Yes  --    Bath Temperature 96.8 °F (36 °C)  --    Treatment Initiation   Dialyze Hours 3  --    Treatment  Initiation Universal Precautions maintained; Lines secured to patient; Connections secured; Prime given; Venous Parameters set; Arterial Parameters set; Air foam detector engaged; Saline line double clamped; F180  --    Dialysis Bath   K+ (Potassium) 3  --    Ca+ (Calcium) 2.5  --    Na+ (Sodium) 138  --    HCO3 (Bicarb) 36  --    Hemodialysis Fistula/Graft Left Arm   No placement date or time found.    Pre-existing: Yes  Orientation: Left  Access Location: Arm   Site Assessment Clean; Dry; Intact Clean; Dry; Intact   Thrill Present Present   Bruit Present Present   Access Interventions Aseptic Technique; Needles taped to patient Aseptic Technique   Dressing Intervention  --  Dressing reinforced   Dressing Status  --  Clean; Dry; Intact   Post-Hemodialysis Assessment   Post-Treatment Procedures  --  Blood returned; Access bleeding time > 10 minutes   Machine Disinfection Process  --  Acid/Vinegar Clean; Heat Disinfect;  Exterior Machine Disinfection   Rinseback Volume (ml)  --  400 ml   Total Liters Processed (l/min)  --  61.1 l/min   Dialyzer Clearance  --  Lightly streaked   Duration of Treatment (minutes)  --  180 minutes   Hemodialysis Intake (ml)  --  400 ml   Hemodialysis Output (ml)  --  2977 ml   NET Removed (ml)  --  2577 ml   Tolerated Treatment  --  Good

## 2021-11-18 NOTE — PROGRESS NOTES
Treatment time: 3 hours  Net UF: 2577 ml    Pre weight: 64.5 kg   Post weight: 63.1 kg  EDW: tbd kg    Access used: LAVG  Access function: good with  ml/min    Medications or blood products given: none    Regular outpatient schedule: [employer] of response to treatment: Tolerated good. VSS. Pt with no c/o. Hemastasis to LAVG obtained. Dressing D/I. Site unremarkable    Copy of dialysis treatment record placed in chart, to be scanned into EMR.

## 2021-11-18 NOTE — CONSULTS
Neurology Consult Note  Reason for Consult: abnormal head CT    Chief complaint: low blood pressure    Dr Roselia Garcia MD asked me to see Kamlesh Enriquez in consultation for evaluation of abnormal head CT    History of Present Illness:  Kamlesh Enriquez is a 64 y.o. male who presents with low blood pressure. I obtained my information via interview w/ the patient and his wife at the bedside, supplemented by chart review. The patient was recently evaluated at [facility] about 3 weeks ago after being evaluated for stroke. He was found to have a moderate size ischemic stroke in the R posterior temporal lobe, occipital lobe, and parietal lobe. Cardiology was involved given his known severe mitral valve stenosis (previous mitral valve replacement) and depressed BILLIE velocities and decided to anticoagulate w/ warfarin. He does have ESRD on HD MWF. I don't believe he had HD yet yesterday but after a [address] came to his home and found his BP to be low (80/50), his PCP was called and advised that he come to the ED to be evaluated. Initial SBP were in the 90s-100s. No fevers. CT head reported hypodensity in R occipital lobe w/ possible laminar necrosis. He was a bit somnolent though not clear if he was altered or not. The patient says the ED MD though he was talking strangely though exam documentation seems to be normal.      Currently he feels very well, at baseline. No complaints. Medical History:  Past Medical History:   Diagnosis Date    Abnormal weight loss 6/25/2012    Abnormal weight loss 6/25/2012    Anemia 6/25/2012    Anger     Buerger's disease ([facility]     ? ?    CAD (coronary artery disease)     CHF (congestive heart failure) (HCC)     Chronic kidney disease, stage IV (severe) (HCC)     Chronic kidney disease, stage V (HCC)     Decreased libido 6/25/2012    Depression     DJD (degenerative joint disease) of cervical spine 1/19/11    MRI - advanced disc degeneration C3-4,5-6 Kvng Trevino MD at [facility] 11/19/20    x`2 stents     CYSTOSCOPY  February 2014    Dr. Franc Smith      left arm    DIALYSIS FISTULA CREATION  July 2014    Dr. Gracy Pan      right arm - plate inserted   [address]    mother gave him kidney    KIDNEY TRANSPLANT  January 16, 2001    MITRAL VALVE REPAIR      MOUTH SURGERY  2008    THYROIDECTOMY  November 18, 2009    total thyroidectomy done re: papillary carcinoma - Dr. Mihir GREENBERG  April 7, 2014    Dr. Dean Meckel  March 16, 2012    Dr. Yakov Gamez  October 17, 2013    Dr. Iveth Sahni  March 19, 2014    Dr. Roc Kevin      stents bilat legs     Scheduled Meds:   sodium chloride flush  10 mL IntraVENous 2 times per day    aspirin  81 mg Oral Daily    atorvastatin  80 mg Oral Daily    cyanocobalamin  1,000 mcg Oral Daily    folic acid  1 mg Oral Daily    gabapentin  100 mg Oral BID    levothyroxine  100 mcg Oral Daily    megestrol  20 mg Oral Daily    [START ON 11/19/2021] predniSONE  5 mg Oral Every Other Day    sevelamer  800 mg Oral TID WC    midodrine  5 mg Oral TID      Medications Prior to Admission:   cyanocobalamin 1000 MCG tablet, Take 1,000 mcg by mouth daily  warfarin (COUMADIN) 5 MG tablet, Take 5 mg by mouth daily  sacubitril-valsartan (ENTRESTO) 24-26 MG per tablet, Take 1 tablet by mouth 2 times daily  folic acid (FOLVITE) 1 MG tablet, Take 1 mg by mouth daily  gabapentin (NEURONTIN) 100 MG capsule, Take 100 mg by mouth 2 times daily.   predniSONE (DELTASONE) 5 MG tablet, Take 1 tablet by mouth every other day TAKE 1 TABLET BY MOUTH EVERY DAY WITH FOOD (Patient taking differently: Take 5 mg by mouth every other day Take 1 tablet by mouth every Monday, Wednesday, and Friday with food prior to dialysis.)  spironolactone (ALDACTONE) 25 MG tablet, Take 25 mg by mouth daily  aspirin 81 MG tablet, Take 81 mg by mouth daily  isosorbide mononitrate (IMDUR) 30 MG extended release tablet, Take 30 mg by mouth daily  carvedilol (COREG) 25 MG tablet, Take 25 mg by mouth 2 times daily (with meals)   atorvastatin (LIPITOR) 80 MG tablet, Take 80 mg by mouth daily  megestrol (MEGACE) 20 MG tablet, Take 20 mg by mouth daily  sevelamer (RENVELA) 800 MG tablet, TAKE 1 TABLET BY MOUTH WITH MEALS THREE TIMES DAILY  levothyroxine (SYNTHROID) 100 MCG tablet, Take 100 mcg by mouth Daily   Cholecalciferol (VITAMIN D) 1000 UNIT TABS, Take 1 tablet by mouth daily. lidocaine-prilocaine (EMLA) 2.5-2.5 % cream, as needed  nitroGLYCERIN (NITROSTAT) 0.4 MG SL tablet, as needed    No Known Allergies    Family History   Problem Relation Age of Onset    Uterine Cancer Mother     Seizures Mother     Cervical Cancer Mother     Cancer Mother         cervical cancer    Lung Cancer Father     Mental Retardation Sister     Mental Retardation Brother     Kidney Disease Daughter         renal tubular acidosis as a child     Social History     Tobacco Use   Smoking Status Former Smoker    Types: Cigarettes    Quit date: 2015    Years since quittin.7   Smokeless Tobacco Never Used   Tobacco Comment    3-cigarettes monthly for 2 yrs     Social History     Substance and Sexual Activity   Drug Use No     Social History     Substance and Sexual Activity   Alcohol Use Yes    Comment: rare social     ROS  Constitutional- No weight loss or fevers  Eyes- No diplopia. No photophobia. Ears/nose/throat- No dysphagia. No Dysarthria  Cardiovascular- No palpitations. No chest pain  Respiratory- No dyspnea. No Cough  Gastrointestinal- No Abdominal pain. No Vomiting. Genitourinary- No incontinence.  No urinary retention  Musculoskeletal- No myalgia. No arthralgia  Skin- No rash. No easy bruising. Psychiatric- No depression. No anxiety  Endocrine- No diabetes. No thyroid issues. Hematologic- No bleeding difficulty. No fatigue  Neurologic- No weakness. No Headache. Exam:  Blood pressure 108/74, pulse 74, temperature 98.9 °F (37.2 °C), temperature source Oral, resp. rate 18, height 6' 2\" (1.88 m), weight 149 lb 4 oz (67.7 kg), SpO2 99 %. Constitutional    Vital signs: BP, HR, and RR reviewed   General alert, no distress, well-nourished  Eyes: unable to visualize the fundi  Cardiovascular: no peripheral edema. Psychiatric: cooperative with examination, no psychotic behavior noted. Neurologic  Mental status:   orientation to person, place, time. General fund of knowledge grossly intact   Memory grossly intact   Attention intact as able to attend well to the exam     Language fluent in conversation   Comprehension intact; follows simple commands  Cranial nerves:   CN2: visual fields full   CN 3,4,6: extraocular muscles intact. Pupils are equal, round, reactive bilaterally. CN5: facial sensation symmetric   CN7: face symmetric without dysarthria  CN8: hearing grossly intact  CN9: palate elevated symmetrically  CN11: trap full strength on shoulder shrug  CN12: tongue midline with protrusion  Strength: good strength in all 4 extremities   Deep tendon reflexes: diminished in all 4 extremities  Sensory: baseline neuropathy in RLE, otherwise light touch intact. Cerebellar/coordination: finger nose finger normal without ataxia  Tone: normal in all 4 extremities  Gait: deferred at this time for safety.       Labs  Glucose 72  Na 135  K 4.5  BUN 3.7 -> 23  Cr 7.4 -> 4.8    BNP 45263  Troponin 0.15    ALT 59  AST 22  Ammonia 24    WBC 5.2K   Hg 8.2  Platelets 297    Blood cultures NG    Studies  CT head w/o 11/17/21, independently reviewed  Ill-defined hypodensity is seen in the right occipital lobe, new compared to 2012, likely sequelae from remote infarct.  Linear areas of increased density   are seen along the cortical surface, likely laminar necrosis. Impression  1. Neurology was consulted for CT head findings. The patient was just at [facility] a few weeks ago for R temporal/parietal/occipital lobe stroke, which is likely what is being visualized on the head CT obtained here. Clinically has no neurologic deficits and feels at baseline. 2.  Hypotension. 3.  ESRD on HD.    4.  HTN. 5.  Severe mitral valve disease/stenosis. Hx MVR. Recommendations  1. No further neurologic workup indicated at this time. 2.  No objection to resuming warfarin.       Jasmine Almonte NP  [address] [place] [address] [address] Neurology    A copy of this note was provided for Dr Hyacinth Rose MD
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
## 2021-11-18 NOTE — CONSULTS
Possible diagnosis - October 2013 - EGD    GERD (gastroesophageal reflux disease)     H/O mitral valve replacement     Hemod[facility]     Hypertension     Hypothyroidism     status-post total thyroidectomy    Iron deficiency anemia     Irritability 6/25/2012    Keloids     Kidney transplants     twice - 1991 and 2001 - Dr. Tamara Juarez urinary tract symptoms 8/5/2014    Memory loss 6/25/2012    MI (myocardial infarction) ([facility] [address] [facility]     Nonerosive nonspecific gastritis 10/17/2013    EGD - October 2013    Paraesophageal hiatal hernia 10/17/2013    EGD - October 2013    S/P kidney transplant 6/25/2012    Sigmoid polyp 8/24/10    Dr. Shaw Chu Thyroid cancer [facility]) 2009    papillary cancer (follicular variant)     Thyroid nodules     will require excision - Dr. Jaydon Francisco       Past Surgical History:   Procedure Laterality Date    ANUS SURGERY  2000    sphincterotomy - Dr. Steele  August 24, 2010    Dr. Abe Lang Dr. Renata Blacker Dr. Montey Aschoff Dr. Montey Aschoff  October 17, 2013    Dr. Montey Aschoff  11/2016    Jovanny Bel [address] N/A 3/2/2020    COLONOSCOPY POLYPECTOMY SNARE/COLD BIOPSY performed by Isabella Malave MD at [address] [place] [address]   11/19/20    x`2 stents     CYSTOSCOPY  February 2014    Dr. Christian Tatum      left arm    DIALYSIS FISTULA CREATION  July 2014    Dr. Dotty Valenzuela      right arm - plate inserted   [address]    mother gave him kidney    KIDNEY TRANSPLANT  January 16, 2001   [facility] MITRAL VALVE REPAIR      MOUTH SURGERY  2008    THYROIDECTOMY  November 18, 2009    total thyroidectomy done re: papillary carcinoma - Dr. Maldonado GREENBERG  April 7, 2014    Dr. Argelia Villareal  March 16, 2012    Dr. Maria Alejandra Jennings  October 17, 2013    Dr. Jahaira Becker  March 19, 2014    Dr. Romelia Arcos      stents bilat legs       Family History   Problem Relation Age of Onset    Uterine Cancer Mother     Seizures Mother     Cervical Cancer Mother     Cancer Mother         cervical cancer    Lung Cancer Father     Mental Retardation Sister     Mental Retardation Brother     Kidney Disease Daughter         renal tubular acidosis as a child        reports that he quit smoking about 6 years ago. His smoking use included cigarettes. He has never used smokeless tobacco. He reports current alcohol use. He reports that he does not use drugs. Medications: Allergies:  Patient has no known allergies.   Scheduled Meds:   sodium chloride flush  10 mL IntraVENous 2 times per day    aspirin  81 mg Oral Daily    atorvastatin  80 mg Oral Daily    cyanocobalamin  1,000 mcg Oral Daily    folic acid  1 mg Oral Daily    gabapentin  100 mg Oral BID    levothyroxine  100 mcg Oral Daily    megestrol  20 mg Oral Daily    [START ON 11/19/2021] predniSONE  5 mg Oral Every Other Day    sevelamer  800 mg Oral TID WC    midodrine  5 mg Oral TID WC     Continuous Infusions:   sodium chloride         Review of Systems:     All systems reviewed but were negative except as mentioned in HPI    Objective:       Vitals:  BP (!) 145/87   Pulse 88   Temp 97.8 °F (36.6 °C) (Axillary)   Resp 15   Ht 6' 2\" (1.88 m)   Wt 149 lb 4 oz (67.7 kg)   SpO2 99%   BMI 19.16 kg/m²   Constitutional:  awake, NAD  HEENT:  MMM, No icterus  Neck: no bruits, No JVD  Cardiovascular:  S1, S2 reg  Respiratory: CTA, no crackles  Abdomen:  +BS, soft, NT, ND  Ext: no lower extremity edema  Psychiatric: mood and affect appropriate  Skin: no rash, turgor wnl  Access: LUE AVG  CNS: alert, no agitation    Data:     Labs:  Hepatic:   Recent Labs     11/17/21  1253   AST 22   ALT 59*   BILITOT 0.3   ALKPHOS 129     BNP: No results for input(s): BNP in the last 72 hours. ABGs: No results for input(s): PHART, PO2ART, LQY1EGY in the last 72 hours. IMAGING:  CT HEAD WO CONTRAST   Final Result   Ill-defined hypodensity is seen in the right occipital lobe, new compared to   2012, likely sequelae from remote infarct. Linear areas of increased density   are seen along the cortical surface, likely laminar necrosis. XR CHEST PORTABLE   Final Result   1. Cardiomegaly with probable pulmonary venous hypertension   2. Right pleural effusion and/or pleural thickening             Assessment :       1. ESRD   Etiology: IgAN  Access: AVG  Volume: Euvolemic    2. Electrolytes/Acid base  No Dyskalemia    Recent Labs     11/18/21  0625   *   K 4.5   CO2 29       3. BMD  -Hyperphosphatemia, SHPT    Lab Results   Component Value Date    .6 (H) 07/21/2016    CALCIUM 9.2 11/18/2021    PHOS 3.8 07/21/2016        4. HTN  -Blood pressure at goal     BP Readings from Last 1 Encounters:   11/18/21 (!) 145/87       5. Anemia  -anemia of CKD    Recent Labs     11/18/21  0625   HGB 8.2*     6. MV stenosis  -severe bioprosthetic valve stenosis    7. CAD/CHF  -TTE: EF 30% to 35%. PLAN :       - Maintenance HD MWF. No HD today  - MBD management  - Anemia management  - Dose meds to GFR<10    Thank you for allowing us to participate in care of [facility] . We will continue to follow. Feel free to contact me with any questions.       Bret Adorno MD  11/18/2021    [facility]  Office : [phone]  Fax :[phone]

## 2021-11-18 NOTE — PLAN OF CARE
Problem: Falls - Risk of:  Goal: Will remain free from falls  Description: Will remain free from falls  11/18/2021 1144 by Mariana Castro RN  Outcome: Ongoing  11/18/2021 0146 by Chris Snow RN  Outcome: Ongoing  Goal: Absence of physical injury  Description: Absence of physical injury  11/18/2021 1144 by Mariana Castro RN  Outcome: Ongoing  11/18/2021 0146 by Chris Snow RN  Outcome: Ongoing     Problem: Infection:  Goal: Will remain free from infection  Description: Will remain free from infection  11/18/2021 1144 by Mariana Castro RN  Outcome: Ongoing  11/18/2021 0146 by Chris Snow RN  Outcome: Ongoing     Problem: Safety:  Goal: Free from accidental physical injury  Description: Free from accidental physical injury  11/18/2021 1144 by Mariana Castro RN  Outcome: Ongoing  11/18/2021 0146 by Chris Snow RN  Outcome: Ongoing  Goal: Free from intentional harm  Description: Free from intentional harm  11/18/2021 1144 by Mariana Castro RN  Outcome: Ongoing  11/18/2021 0146 by Chris Snow RN  Outcome: Ongoing     Problem: Daily Care:  Goal: Daily care needs are met  Description: Daily care needs are met  11/18/2021 1144 by Mariana Castro RN  Outcome: Ongoing  11/18/2021 0146 by Chris Snow RN  Outcome: Ongoing     Problem: Pain:  Goal: Patient's pain/discomfort is manageable  Description: Patient's pain/discomfort is manageable  11/18/2021 1144 by Mariana Castro RN  Outcome: Ongoing  11/18/2021 0146 by Chris Snow RN  Outcome: Ongoing     Problem: Skin Integrity:  Goal: Skin integrity will stabilize  Description: Skin integrity will stabilize  11/18/2021 1144 by Mraiana Castro RN  Outcome: Ongoing  11/18/2021 0146 by Chris Snow RN  Outcome: Ongoing     Problem: Discharge Planning:  Goal: Patients continuum of care needs are met  Description: Patients continuum of care needs are met  11/18/2021 1144 by Mariana Castro RN  Outcome: Ongoing  11/18/2021 0146 by Tatum Zuniga RN  Outcome: Ongoing     Problem: Fluid Volume:  Goal: Will show no signs or symptoms of fluid imbalance  Description: Will show no signs or symptoms of fluid imbalance  Outcome: Ongoing

## 2021-11-18 NOTE — PROGRESS NOTES
Screening  Patient Currently in Pain: No          Orientation  Orientation  Overall Orientation Status: Within Functional Limits     Social/Functional History  Social/Functional History  Lives With: Spouse  Type of Home: House  Home Layout: Two level, Bed/Bath upstairs, 1/2 bath on main level, Laundry in basement  Home Access: Stairs to enter with rails  Entrance Stairs - Number of Steps: 2&1  Bathroom Shower/Tub: Walk-in shower  Bathroom Toilet: Standard  Bathroom Equipment: Grab bars in shower, Shower chair  Home Equipment: Cane  ADL Assistance: Independent  Homemaking Assistance: Needs assistance (wife does laundry, cooking)  Ambulation Assistance: Independent (cane PRN)  Transfer Assistance: Independent  Active : No  Patient's  Info: needs eye doctor approval after stroke leaving minor change in peripheral vision  Occupation: On disability  Leisure & Hobbies: sports watching, reading, dialysis 3x, out to eat with wife, yardwork     Cognition   Cognition  Overall Cognitive Status: WFL    Objective  Strength RLE  Strength RLE: WFL  Strength LLE  Strength LLE: WFL  Motor Control  Gross Motor?: WFL     Bed mobility  Supine to Sit: Modified independent  Sit to Supine: Unable to assess  Comment: in recliner at end of session  Transfers  Sit to Stand: Independent  Stand to sit:  Independent  Ambulation  Ambulation?: Yes  Ambulation 1  Surface: level tile  Device: No Device  Assistance: Independent  Gait Deviations: None  Distance: 60'  Stairs/Curb  Stairs?: No     Balance  Posture: Good  Sitting - Static: Good  Sitting - Dynamic: Good  Standing - Static: Good  Standing - Dynamic: Good        Plan   Safety Devices  Type of devices: Left in chair, Call light within reach, Nurse notified      AM-PAC Score  AM-PAC Inpatient Mobility Raw Score : 24 (11/18/21 1101)  AM-PAC Inpatient T-Scale Score : 61.14 (11/18/21 1101)  Mobility Inpatient CMS 0-100% Score: 0 (11/18/21 1101)  Mobility Inpatient CMS G-Code Modifier :  (11/18/21 1101)            Therapy Time   Individual Concurrent Group Co-treatment   Time In 1015         Time Out 1035         Minutes 20         Timed Code Treatment Minutes: 10 Minutes       Jeovanny Chau PT

## 2021-11-18 NOTE — PROGRESS NOTES
4 Eyes Skin Assessment     NAME:  Savana Flanagan  YOB: 1965  MEDICAL RECORD NUMBER:  [id number]    The patient is being assess for  Admission    I agree that 2 RN's have performed a thorough Head to Toe Skin Assessment on the patient. ALL assessment sites listed below have been assessed. Areas assessed by both nurses:    Head, Face, Ears, Shoulders, Back, Chest, Arms, Elbows, Hands, Sacrum. Buttock, Coccyx, Ischium and Legs. Feet and Heels        Does the Patient have a Wound?  No noted wound(s)       Brayan Prevention initiated:  No   Wound Care Orders initiated:  No    Pressure Injury (Stage 3,4, Unstageable, DTI, NWPT, and Complex wounds) if present place consult order under [de-identified] No    New and Established Ostomies if present place consult order under : No      Nurse 1 eSignature: Electronically signed by Dory Osuna RN on 11/18/21 at 1:39 AM EST    **SHARE this note so that the co-signing nurse is able to place an eSignature**    Nurse 2 eSignature: {Esignature:[id number]}

## 2021-11-19 LAB
ANION GAP SERPL CALCULATED.3IONS-SCNC: 16 MMOL/L (ref 3–16)
APTT: 38.5 SEC (ref 26.2–38.6)
BUN BLDV-MCNC: 38 MG/DL (ref 7–20)
CALCIUM SERPL-MCNC: 9.1 MG/DL (ref 8.3–10.6)
CHLORIDE BLD-SCNC: 93 MMOL/L (ref 99–110)
CO2: 26 MMOL/L (ref 21–32)
CREAT SERPL-MCNC: 6.4 MG/DL (ref 0.9–1.3)
GFR AFRICAN AMERICAN: 11
GFR NON-AFRICAN AMERICAN: 9
GLUCOSE BLD-MCNC: 69 MG/DL (ref 70–99)
HBV SURFACE AB TITR SER: 7.01 MIU/ML
HCT VFR BLD CALC: 26.6 % (ref 40.5–52.5)
HEMATOLOGY PATH CONSULT: NO
HEMOGLOBIN: 8.7 G/DL (ref 13.5–17.5)
HEPATITIS B SURFACE ANTIGEN INTERPRETATION: NORMAL
INR BLD: 1.79 (ref 0.88–1.12)
MCH RBC QN AUTO: 37.3 PG (ref 26–34)
MCHC RBC AUTO-ENTMCNC: 32.9 G/DL (ref 31–36)
MCV RBC AUTO: 113.4 FL (ref 80–100)
PDW BLD-RTO: 19.3 % (ref 12.4–15.4)
PLATELET # BLD: 204 K/UL (ref 135–450)
PMV BLD AUTO: 8.3 FL (ref 5–10.5)
POTASSIUM REFLEX MAGNESIUM: 5.1 MMOL/L (ref 3.5–5.1)
PROTHROMBIN TIME: 20.7 SEC (ref 9.9–12.7)
RBC # BLD: 2.34 M/UL (ref 4.2–5.9)
SODIUM BLD-SCNC: 135 MMOL/L (ref 136–145)
WBC # BLD: 5.1 K/UL (ref 4–11)

## 2021-11-19 PROCEDURE — 85027 COMPLETE CBC AUTOMATED: CPT

## 2021-11-19 PROCEDURE — 87340 HEPATITIS B SURFACE AG IA: CPT

## 2021-11-19 PROCEDURE — 85610 PROTHROMBIN TIME: CPT

## 2021-11-19 PROCEDURE — 1200000000 HC SEMI PRIVATE

## 2021-11-19 PROCEDURE — 90935 HEMODIALYSIS ONE EVALUATION: CPT | Performed by: HOSPITALIST

## 2021-11-19 PROCEDURE — 6360000002 HC RX W HCPCS: Performed by: INTERNAL MEDICINE

## 2021-11-19 PROCEDURE — 6370000000 HC RX 637 (ALT 250 FOR IP): Performed by: INTERNAL MEDICINE

## 2021-11-19 PROCEDURE — 36415 COLL VENOUS BLD VENIPUNCTURE: CPT

## 2021-11-19 PROCEDURE — 85730 THROMBOPLASTIN TIME PARTIAL: CPT

## 2021-11-19 PROCEDURE — 90935 HEMODIALYSIS ONE EVALUATION: CPT

## 2021-11-19 PROCEDURE — 80048 BASIC METABOLIC PNL TOTAL CA: CPT

## 2021-11-19 PROCEDURE — 86704 HEP B CORE ANTIBODY TOTAL: CPT

## 2021-11-19 PROCEDURE — 2500000003 HC RX 250 WO HCPCS: Performed by: INTERNAL MEDICINE

## 2021-11-19 PROCEDURE — 86706 HEP B SURFACE ANTIBODY: CPT

## 2021-11-19 RX ORDER — HEPARIN SODIUM 10000 [USP'U]/100ML
5-30 INJECTION, SOLUTION INTRAVENOUS CONTINUOUS
Status: DISCONTINUED | OUTPATIENT
Start: 2021-11-19 | End: 2021-11-22

## 2021-11-19 RX ORDER — HEPARIN SODIUM 10000 [USP'U]/100ML
5-30 INJECTION, SOLUTION INTRAVENOUS CONTINUOUS
Status: DISCONTINUED | OUTPATIENT
Start: 2021-11-19 | End: 2021-11-19

## 2021-11-19 RX ORDER — HEPARIN SODIUM 1000 [USP'U]/ML
40 INJECTION, SOLUTION INTRAVENOUS; SUBCUTANEOUS PRN
Status: DISCONTINUED | OUTPATIENT
Start: 2021-11-19 | End: 2021-11-19

## 2021-11-19 RX ORDER — WARFARIN SODIUM 5 MG/1
10 TABLET ORAL
Status: COMPLETED | OUTPATIENT
Start: 2021-11-19 | End: 2021-11-19

## 2021-11-19 RX ORDER — WARFARIN SODIUM 5 MG/1
5 TABLET ORAL
Status: DISCONTINUED | OUTPATIENT
Start: 2021-11-19 | End: 2021-11-19

## 2021-11-19 RX ORDER — HEPARIN SODIUM 1000 [USP'U]/ML
80 INJECTION, SOLUTION INTRAVENOUS; SUBCUTANEOUS PRN
Status: DISCONTINUED | OUTPATIENT
Start: 2021-11-19 | End: 2021-11-19

## 2021-11-19 RX ORDER — HEPARIN SODIUM 1000 [USP'U]/ML
3800 INJECTION, SOLUTION INTRAVENOUS; SUBCUTANEOUS ONCE
Status: COMPLETED | OUTPATIENT
Start: 2021-11-19 | End: 2021-11-19

## 2021-11-19 RX ADMIN — HEPARIN SODIUM 760 UNITS/HR: 10000 INJECTION, SOLUTION INTRAVENOUS at 16:06

## 2021-11-19 RX ADMIN — SEVELAMER CARBONATE 800 MG: 800 TABLET, FILM COATED ORAL at 13:38

## 2021-11-19 RX ADMIN — ASPIRIN 81 MG: 81 TABLET, CHEWABLE ORAL at 13:39

## 2021-11-19 RX ADMIN — CYANOCOBALAMIN TAB 1000 MCG 1000 MCG: 1000 TAB at 13:39

## 2021-11-19 RX ADMIN — PREDNISONE 5 MG: 5 TABLET ORAL at 13:39

## 2021-11-19 RX ADMIN — WARFARIN SODIUM 10 MG: 5 TABLET ORAL at 17:38

## 2021-11-19 RX ADMIN — LEVOTHYROXINE SODIUM 100 MCG: 0.1 TABLET ORAL at 05:31

## 2021-11-19 RX ADMIN — GABAPENTIN 100 MG: 100 CAPSULE ORAL at 13:39

## 2021-11-19 RX ADMIN — SEVELAMER CARBONATE 800 MG: 800 TABLET, FILM COATED ORAL at 17:38

## 2021-11-19 RX ADMIN — GABAPENTIN 100 MG: 100 CAPSULE ORAL at 20:19

## 2021-11-19 RX ADMIN — HEPARIN SODIUM 3800 UNITS: 1000 INJECTION INTRAVENOUS; SUBCUTANEOUS at 16:04

## 2021-11-19 RX ADMIN — FOLIC ACID 1 MG: 1 TABLET ORAL at 13:39

## 2021-11-19 RX ADMIN — ATORVASTATIN CALCIUM 80 MG: 80 TABLET, FILM COATED ORAL at 13:39

## 2021-11-19 ASSESSMENT — PAIN SCALES - GENERAL
PAINLEVEL_OUTOF10: 0

## 2021-11-19 NOTE — PLAN OF CARE
Problem: Falls - Risk of:  Goal: Will remain free from falls  Description: Will remain free from falls  11/19/2021 1125 by Solis Huff RN  Outcome: Ongoing     Problem: Infection:  Goal: Will remain free from infection  Description: Will remain free from infection  11/19/2021 1125 by Solis Huff RN  Outcome: Ongoing     Problem: Safety:  Goal: Free from accidental physical injury  Description: Free from accidental physical injury  11/19/2021 1125 by Solis Huff RN  Outcome: Ongoing     Problem: Daily Care:  Goal: Daily care needs are met  Description: Daily care needs are met  11/19/2021 1125 by Solis Huff RN  Outcome: Ongoing     Problem: Pain:  Goal: Patient's pain/discomfort is manageable  Description: Patient's pain/discomfort is manageable  11/19/2021 1125 by Solis Huff RN  Outcome: Ongoing     Problem: Skin Integrity:  Goal: Skin integrity will stabilize  Description: Skin integrity will stabilize  11/19/2021 1125 by Solis Huff RN  Outcome: Ongoing     Problem: Discharge Planning:  Goal: Patients continuum of care needs are met  Description: Patients continuum of care needs are met  11/19/2021 1125 by Solis Huff RN  Outcome: Ongoing     Problem: Fluid Volume:  Goal: Will show no signs or symptoms of fluid imbalance  Description: Will show no signs or symptoms of fluid imbalance  11/19/2021 1125 by Solis Huff RN  Outcome: Ongoing

## 2021-11-19 NOTE — PROGRESS NOTES
Hospitalist Progress Note      PCP: Sarah Zuniga MD    Date of Admission: 11/17/2021        Subjective: seen in dialysis, feeling ok, no chest pain or headache. Medications:  Reviewed    Infusion Medications    heparin (PORCINE) Infusion      sodium chloride       Scheduled Medications    warfarin  5 mg Oral Daily    sodium chloride flush  10 mL IntraVENous 2 times per day    aspirin  81 mg Oral Daily    atorvastatin  80 mg Oral Daily    cyanocobalamin  1,000 mcg Oral Daily    folic acid  1 mg Oral Daily    gabapentin  100 mg Oral BID    levothyroxine  100 mcg Oral Daily    megestrol  20 mg Oral Daily    predniSONE  5 mg Oral Every Other Day    sevelamer  800 mg Oral TID WC    midodrine  5 mg Oral TID WC     PRN Meds: heparin (porcine), heparin (porcine), sodium chloride flush, sodium chloride, ondansetron **OR** ondansetron, acetaminophen **OR** acetaminophen, labetalol      Intake/Output Summary (Last 24 hours) at 11/19/2021 1423  Last data filed at 11/19/2021 0830  Gross per 24 hour   Intake 600 ml   Output    Net 600 ml       Physical Exam Performed:    BP (!) 149/98   Pulse 90   Temp 97.5 °F (36.4 °C) (Oral)   Resp 18   Ht 6' 2\" (1.88 m)   Wt 139 lb 1.8 oz (63.1 kg)   SpO2 98%   BMI 17.86 kg/m²     General appearance: No apparent distress  Respiratory:  Normal respiratory effort. Clear to auscultation, bilaterally without Rales/Wheezes/Rhonchi. Cardiovascular: diastolic murmur   Abdomen: Soft, non-tender, non-distended  Musculoskeletal: No clubbing, cyanosis   Skin: Skin color, texture, turgor normal.  No rashes or lesions.   Neurologic:  No focal weakness   Psychiatric: Alert and oriented  Capillary Refill: Brisk,3 seconds, normal   Peripheral Pulses: +2 palpable, equal bilaterally       Labs:   Recent Labs     11/17/21  1612 11/18/21  0625 11/19/21  0531   WBC 5.0 5.2 5.1   HGB 8.2* 8.2* 8.7*   HCT 25.2* 24.9* 26.6*    181 204     Recent Labs     11/17/21  1253 11/18/21  0625 11/19/21  0531    135* 135*   K 4.0 4.5 5.1   CL 97* 94* 93*   CO2 28 29 26   BUN 37* 23* 38*   CREATININE 7.4* 4.8* 6.4*   CALCIUM 9.4 9.2 9.1     Recent Labs     11/17/21  1253   AST 22   ALT 59*   BILITOT 0.3   ALKPHOS 129     Recent Labs     11/19/21  1330   INR 1.79*     Recent Labs     11/17/21  1253   TROPONINI 0.15*       Urinalysis:      Lab Results   Component Value Date    NITRU Negative 07/21/2016    WBCUA 0-3 06/25/2012    RBCUA <1 /HPF 07/21/2016    BLOODU TRACE 06/25/2012    SPECGRAV 1.014 07/21/2016    GLUCOSEU Trace 07/21/2016       Radiology:  CT HEAD WO CONTRAST   Final Result   Ill-defined hypodensity is seen in the right occipital lobe, new compared to   2012, likely sequelae from remote infarct. Linear areas of increased density   are seen along the cortical surface, likely laminar necrosis. XR CHEST PORTABLE   Final Result   1. Cardiomegaly with probable pulmonary venous hypertension   2. Right pleural effusion and/or pleural thickening                 Assessment/Plan:    Active Hospital Problems    Diagnosis     ESRD (end stage renal disease) ([facility] [address] [N18.6]      1. Acute metabolic encephalopathy, uremia in differential, nephrology consulted CT head with hypodensity in right occipital lobe new compared to 2012 but appears like a remote infarct. to note that patient had a recent stroke. Discussed with cardiology, noted some note from previous admission suspecting etiology as embolic, INR not therapeutic, after discussion with cardiology, recommendation to bridge with heparin for now. 2.  End-stage renal disease, nephrology consulted dialysis  3. Elevated troponin and BNP, likely related to end-stage renal disease. 4.  Anemia, appears of chronic disease, no signs of bleeding  5. Right pleural effusion, likely overall due to fluid overload. Dialysis  6.   Severe mitral stenosis, plan for mechanical valve supposed to follow-up with cardiothoracic surgery next month, will consult cardiology for medication optimization    Diet: ADULT DIET; Regular; Low Sodium (2 gm); Low Potassium (Less than 3000 mg/day);  Low Phosphorus (Less than 1000 mg); 2000 ml  Code Status: Full Code        Angelo Colón MD

## 2021-11-19 NOTE — FLOWSHEET NOTE
Treatment time:3 hours  Net UF: 446 ml    Pre weight: 63.1 kg   Post weight: 62.8 kg  EDW: 62.0 kg    Access used: PEDRITO Graft  Access function:Fair with  ml/min    Medications or blood products given: None     Regular outpatient schedule: LISETH (Saw)    Summary of response to treatment: Fair              Copy of dialysis treatment record placed in chart, to be scanned into EMR.       11/19/21 0922 11/19/21 1229   Vital Signs   BP (!) 140/96 (!) 160/95   Temp 97.1 °F (36.2 °C) 97 °F (36.1 °C)   Pulse 78 91   Resp 16 16   Weight 138 lb 14.6 oz (63 kg) 138 lb 7.2 oz (62.8 kg)   Percent Weight Change -0.14 -0.48   Dry Weight 136 lb 11 oz (62 kg) 136 lb 11 oz (62 kg)

## 2021-11-19 NOTE — PLAN OF CARE
Problem: Falls - Risk of:  Goal: Will remain free from falls  Description: Will remain free from falls  11/18/2021 2329 by Carol Vazquez RN  Outcome: Ongoing     Problem: Falls - Risk of:  Goal: Absence of physical injury  Description: Absence of physical injury  11/18/2021 2329 by Carol Vazquez RN  Outcome: Ongoing     Problem: Infection:  Goal: Will remain free from infection  Description: Will remain free from infection  11/18/2021 2329 by Carol Vazquez RN  Outcome: Ongoing     Problem: Safety:  Goal: Free from accidental physical injury  Description: Free from accidental physical injury  11/18/2021 2329 by Carol Vazquez RN  Outcome: Ongoing     Problem: Safety:  Goal: Free from intentional harm  Description: Free from intentional harm  11/18/2021 2329 by Carol Vazquez RN  Outcome: Ongoing     Problem: Daily Care:  Goal: Daily care needs are met  Description: Daily care needs are met  11/18/2021 2329 by Carol Vazquez RN  Outcome: Ongoing     Problem: Pain:  Goal: Patient's pain/discomfort is manageable  Description: Patient's pain/discomfort is manageable  11/18/2021 2329 by Carol Vazquez RN  Outcome: Ongoing     Problem: Skin Integrity:  Goal: Skin integrity will stabilize  Description: Skin integrity will stabilize  11/18/2021 2329 by Carol Vazquez RN  Outcome: Ongoing     Problem: Discharge Planning:  Goal: Patients continuum of care needs are met  Description: Patients continuum of care needs are met  11/18/2021 2329 by Carol Vazquez RN  Outcome: Ongoing     Problem: Fluid Volume:  Goal: Will show no signs or symptoms of fluid imbalance  Description: Will show no signs or symptoms of fluid imbalance  11/18/2021 2329 by Carol Vazquez RN  Outcome: Ongoing

## 2021-11-19 NOTE — CONSULTS
Seen echocardiogram reviewed. Had a long discussion with the patient. Patient needs mitral valve replacement for severe stenosis of bioprosthetic mitral valve. He has had a complete work-up at [facility] and would like to keep his cardiothoracic surgeon at [facility].  I agree patient would return back to [facility] for mitral valve replacement. There is no further cardiac work-up for us to do here.       You Ott MD

## 2021-11-19 NOTE — PROGRESS NOTES
Clinical Pharmacy Note  Heparin Dosing Consult    Reny Crowley is a 64 y.o. male ordered heparin per low dose nomogram by Dr. Chintan Matias. Lab Results   Component Value Date    APTT 34.5 07/21/2016     Lab Results   Component Value Date    HGB 8.7 11/19/2021    HCT 26.6 11/19/2021     11/19/2021    INR 1.79 11/19/2021       Ht Readings from Last 1 Encounters:   11/17/21 6' 2\" (1.88 m)        Wt Readings from Last 1 Encounters:   11/19/21 139 lb 1.8 oz (63.1 kg)        Assessment/Plan:  Initial bolus: 760 units  Initial infusion rate: 3800 units/hr  Next aPTT: 2200    Pharmacy will continue to monitor adjust heparin based on aPTT results using nomogram below:     LOW DOSE HEPARIN PROTOCOL (ACS/STEMI/A FIB)     Initial Bolus: 60 units/kg Max Bolus: 4,000 units       Initial Rate: 12 units/kg/hr Max Initial Rate: 1,000 units/hr     aPTT < 45   Heparin 60 units/kg bolus Increase infusion by 4 units/kg/hr       (maximum 4,000 units)   aPTT 45-59.9   Heparin 30 units/kg bolus Increase infusion by 2 units/kg/hr       (maximum 2,000 units)   aPTT 60-90   No bolus   No change   aPTT 90.1-97.5 No bolus   Decrease infusion by 1 units/kg/hr   aPTT 97.6-105  No bolus     Decrease infusion by 2 units/kg/hr   aPTT > 105   Hold heparin for 1 hour Decrease infusion by 3 units/kg/hr     Obtain aPTT 6 hours after initial bolus and 6 hours after any dose change until two consecutive therapeutic aPTTs are achieved - then daily.

## 2021-11-19 NOTE — PROGRESS NOTES
Clinical Pharmacy Note  Warfarin Consult    Savana Flanagan is a 64 y.o. male receiving warfarin managed by pharmacy. Patient being bridged with heparin. Warfarin Indication: mitral valve replacement  Target INR range: 2-3   Dose prior to admission: 5 mg daily    Current warfarin drug-drug interactions:      Recent Labs     11/17/21  1612 11/18/21  0625 11/19/21  0531 11/19/21  1330   HGB 8.2* 8.2* 8.7*  --    HCT 25.2* 24.9* 26.6*  --    INR  --   --   --  1.79*       Assessment/Plan:    Warfarin 5 mg tonight. Daily PT/INR until stable within therapeutic range. Thank you for the consult. Will continue to follow.   Khoi Gomez, [facility]

## 2021-11-19 NOTE — PROGRESS NOTES
Office: [phone]       Fax: [phone]      Nephrology Progress Note        Patient's Name: aKe Watson  Date of Visit: 11/19/2021    Reason for Consult:  ESRD management    History of Present Illness:      Kae Watson is a 64 y.o. male with PMHx of hypertension, IGAN, status post renal txp x2, ESRD who was admitted on 11/17/2021 with complaints of low BP  Had evidence of fluid overload. Dialyzed overnight  Dialysis initiation: 2014  Etiology of ESRD: IgAN  Dialysis Location: [facility]  Schedule: F  Primary Nephrologist: Dr. Cehikh Marcial  EDW: 62 kg  Last Dialysis: 11/17  Access: AVG     INTERVAL HISTORY    Feels better  Shortness of breath: No   tolerating HD      Medications: Allergies:  Patient has no known allergies.   Scheduled Meds:   sodium chloride flush  10 mL IntraVENous 2 times per day    aspirin  81 mg Oral Daily    atorvastatin  80 mg Oral Daily    cyanocobalamin  1,000 mcg Oral Daily    folic acid  1 mg Oral Daily    gabapentin  100 mg Oral BID    levothyroxine  100 mcg Oral Daily    megestrol  20 mg Oral Daily    predniSONE  5 mg Oral Every Other Day    sevelamer  800 mg Oral TID WC    midodrine  5 mg Oral TID WC     Continuous Infusions:   sodium chloride           Objective:       Vitals:  /85   Pulse 80   Temp 97.7 °F (36.5 °C) (Oral)   Resp 18   Ht 6' 2\" (1.88 m)   Wt 139 lb 1.8 oz (63.1 kg)   SpO2 97%   BMI 17.86 kg/m²   Constitutional:  awake, NAD  HEENT:  MMM, No icterus  Cardiovascular:  S1, S2 reg  Respiratory: CTA, no crackles  Abdomen:  +BS, soft, NT, ND  Ext: no lower extremity edema  Access: LUE AVG  CNS: alert, no agitation    Data:     Labs:  Hepatic:   Recent Labs     11/17/21  1253   AST 22   ALT 59*   BILITOT 0.3   ALKPHOS 129     BNP: No results for input(s): BNP in the last 72 hours.  ABGs: No results for input(s): PHART, PO2ART, RGQ7FRC in the last 72 hours. IMAGING:  CT HEAD WO CONTRAST   Final Result   Ill-defined hypodensity is seen in the right occipital lobe, new compared to   2012, likely sequelae from remote infarct. Linear areas of increased density   are seen along the cortical surface, likely laminar necrosis. XR CHEST PORTABLE   Final Result   1. Cardiomegaly with probable pulmonary venous hypertension   2. Right pleural effusion and/or pleural thickening             Assessment :       1. ESRD   Etiology: IgAN  Access: AVG  Volume: Euvolemic    2. Electrolytes/Acid base  No Dyskalemia    Recent Labs     11/19/21  0531   *   K 5.1   CO2 26       3. BMD  -Hyperphosphatemia, SHPT    Lab Results   Component Value Date    .6 (H) 07/21/2016    CALCIUM 9.1 11/19/2021    PHOS 3.8 07/21/2016        4. HTN  -Blood pressure at goal     BP Readings from Last 1 Encounters:   11/19/21 120/85       5. Anemia  -anemia of CKD    Recent Labs     11/19/21  0531   HGB 8.7*     6. MV stenosis  -severe bioprosthetic valve stenosis    7. CAD/CHF  -TTE: EF 30% to 35%. PLAN :       - Maintenance HD MWF. Minimal UF. seen on HD  - MBD management  - Anemia management  - Dose meds to GFR<10    Thank you for allowing us to [facility] . We will continue to follow. Feel free to contact me with any questions.       Isabella Mobley MD  11/19/2021    [facility] [address]  Office : [phone]  Fax :[phone]

## 2021-11-19 NOTE — PROGRESS NOTES
Spoke with pt's wife, she does NOT want our cardiology team to adjust patient's medications and she is adamant that the pt will discharge today. Will pass along concerns to appropriate people. Pt to dialysis via stretcher at this time.

## 2021-11-20 LAB
ANION GAP SERPL CALCULATED.3IONS-SCNC: 16 MMOL/L (ref 3–16)
APTT: 50.4 SEC (ref 26.2–38.6)
APTT: 58.1 SEC (ref 26.2–38.6)
BUN BLDV-MCNC: 41 MG/DL (ref 7–20)
CALCIUM SERPL-MCNC: 9.3 MG/DL (ref 8.3–10.6)
CHLORIDE BLD-SCNC: 94 MMOL/L (ref 99–110)
CO2: 26 MMOL/L (ref 21–32)
CREAT SERPL-MCNC: 6.1 MG/DL (ref 0.9–1.3)
GFR AFRICAN AMERICAN: 12
GFR NON-AFRICAN AMERICAN: 10
GLUCOSE BLD-MCNC: 90 MG/DL (ref 70–99)
INR BLD: 1.91 (ref 0.88–1.12)
INR BLD: 1.98 (ref 0.88–1.12)
POTASSIUM REFLEX MAGNESIUM: 5 MMOL/L (ref 3.5–5.1)
PROTHROMBIN TIME: 22.2 SEC (ref 9.9–12.7)
PROTHROMBIN TIME: 23 SEC (ref 9.9–12.7)
SODIUM BLD-SCNC: 136 MMOL/L (ref 136–145)

## 2021-11-20 PROCEDURE — 80048 BASIC METABOLIC PNL TOTAL CA: CPT

## 2021-11-20 PROCEDURE — 1200000000 HC SEMI PRIVATE

## 2021-11-20 PROCEDURE — 99233 SBSQ HOSP IP/OBS HIGH 50: CPT | Performed by: INTERNAL MEDICINE

## 2021-11-20 PROCEDURE — 6370000000 HC RX 637 (ALT 250 FOR IP): Performed by: INTERNAL MEDICINE

## 2021-11-20 PROCEDURE — 85610 PROTHROMBIN TIME: CPT

## 2021-11-20 PROCEDURE — 85027 COMPLETE CBC AUTOMATED: CPT

## 2021-11-20 PROCEDURE — 6360000002 HC RX W HCPCS: Performed by: INTERNAL MEDICINE

## 2021-11-20 PROCEDURE — 6360000002 HC RX W HCPCS

## 2021-11-20 PROCEDURE — 85730 THROMBOPLASTIN TIME PARTIAL: CPT

## 2021-11-20 PROCEDURE — 2580000003 HC RX 258: Performed by: INTERNAL MEDICINE

## 2021-11-20 PROCEDURE — 36415 COLL VENOUS BLD VENIPUNCTURE: CPT

## 2021-11-20 PROCEDURE — 2500000003 HC RX 250 WO HCPCS: Performed by: INTERNAL MEDICINE

## 2021-11-20 RX ORDER — HEPARIN SODIUM 1000 [USP'U]/ML
1900 INJECTION, SOLUTION INTRAVENOUS; SUBCUTANEOUS ONCE
Status: COMPLETED | OUTPATIENT
Start: 2021-11-20 | End: 2021-11-20

## 2021-11-20 RX ORDER — ISOSORBIDE MONONITRATE 30 MG/1
30 TABLET, EXTENDED RELEASE ORAL DAILY
Status: DISCONTINUED | OUTPATIENT
Start: 2021-11-20 | End: 2021-11-22 | Stop reason: HOSPADM

## 2021-11-20 RX ORDER — LANOLIN ALCOHOL/MO/W.PET/CERES
6 CREAM (GRAM) TOPICAL NIGHTLY PRN
Status: DISCONTINUED | OUTPATIENT
Start: 2021-11-20 | End: 2021-11-22 | Stop reason: HOSPADM

## 2021-11-20 RX ORDER — SPIRONOLACTONE 25 MG/1
25 TABLET ORAL DAILY
Status: DISCONTINUED | OUTPATIENT
Start: 2021-11-20 | End: 2021-11-22 | Stop reason: HOSPADM

## 2021-11-20 RX ORDER — WARFARIN SODIUM 5 MG/1
5 TABLET ORAL
Status: COMPLETED | OUTPATIENT
Start: 2021-11-20 | End: 2021-11-20

## 2021-11-20 RX ORDER — CARVEDILOL 25 MG/1
25 TABLET ORAL 2 TIMES DAILY WITH MEALS
Status: DISCONTINUED | OUTPATIENT
Start: 2021-11-20 | End: 2021-11-22 | Stop reason: HOSPADM

## 2021-11-20 RX ORDER — HEPARIN SODIUM 1000 [USP'U]/ML
3800 INJECTION, SOLUTION INTRAVENOUS; SUBCUTANEOUS ONCE
Status: COMPLETED | OUTPATIENT
Start: 2021-11-20 | End: 2021-11-20

## 2021-11-20 RX ADMIN — MIDODRINE HYDROCHLORIDE 5 MG: 5 TABLET ORAL at 11:38

## 2021-11-20 RX ADMIN — LEVOTHYROXINE SODIUM 100 MCG: 0.1 TABLET ORAL at 08:02

## 2021-11-20 RX ADMIN — SODIUM CHLORIDE, PRESERVATIVE FREE 10 ML: 5 INJECTION INTRAVENOUS at 22:53

## 2021-11-20 RX ADMIN — SEVELAMER CARBONATE 800 MG: 800 TABLET, FILM COATED ORAL at 19:06

## 2021-11-20 RX ADMIN — SACUBITRIL AND VALSARTAN 1 TABLET: 24; 26 TABLET, FILM COATED ORAL at 11:38

## 2021-11-20 RX ADMIN — HEPARIN SODIUM 1130 UNITS/HR: 10000 INJECTION, SOLUTION INTRAVENOUS at 11:46

## 2021-11-20 RX ADMIN — CARVEDILOL 25 MG: 25 TABLET, FILM COATED ORAL at 11:38

## 2021-11-20 RX ADMIN — GABAPENTIN 100 MG: 100 CAPSULE ORAL at 22:52

## 2021-11-20 RX ADMIN — GABAPENTIN 100 MG: 100 CAPSULE ORAL at 08:52

## 2021-11-20 RX ADMIN — SEVELAMER CARBONATE 800 MG: 800 TABLET, FILM COATED ORAL at 08:54

## 2021-11-20 RX ADMIN — MIDODRINE HYDROCHLORIDE 5 MG: 5 TABLET ORAL at 08:51

## 2021-11-20 RX ADMIN — CARVEDILOL 25 MG: 25 TABLET, FILM COATED ORAL at 19:05

## 2021-11-20 RX ADMIN — SEVELAMER CARBONATE 800 MG: 800 TABLET, FILM COATED ORAL at 11:38

## 2021-11-20 RX ADMIN — ASPIRIN 81 MG: 81 TABLET, CHEWABLE ORAL at 08:51

## 2021-11-20 RX ADMIN — ISOSORBIDE MONONITRATE 30 MG: 30 TABLET, EXTENDED RELEASE ORAL at 11:38

## 2021-11-20 RX ADMIN — HEPARIN SODIUM 3800 UNITS: 1000 INJECTION INTRAVENOUS; SUBCUTANEOUS at 00:59

## 2021-11-20 RX ADMIN — MIDODRINE HYDROCHLORIDE 5 MG: 5 TABLET ORAL at 19:05

## 2021-11-20 RX ADMIN — HEPARIN SODIUM 1900 UNITS: 1000 INJECTION INTRAVENOUS; SUBCUTANEOUS at 19:06

## 2021-11-20 RX ADMIN — HEPARIN SODIUM 1900 UNITS: 1000 INJECTION INTRAVENOUS; SUBCUTANEOUS at 11:46

## 2021-11-20 RX ADMIN — WARFARIN SODIUM 5 MG: 5 TABLET ORAL at 19:05

## 2021-11-20 RX ADMIN — ATORVASTATIN CALCIUM 80 MG: 80 TABLET, FILM COATED ORAL at 08:51

## 2021-11-20 RX ADMIN — HEPARIN SODIUM 1260 UNITS/HR: 10000 INJECTION, SOLUTION INTRAVENOUS at 19:55

## 2021-11-20 RX ADMIN — MEGESTROL ACETATE 20 MG: 40 TABLET ORAL at 11:36

## 2021-11-20 RX ADMIN — SACUBITRIL AND VALSARTAN 1 TABLET: 24; 26 TABLET, FILM COATED ORAL at 22:51

## 2021-11-20 RX ADMIN — FOLIC ACID 1 MG: 1 TABLET ORAL at 08:51

## 2021-11-20 RX ADMIN — SPIRONOLACTONE 25 MG: 25 TABLET ORAL at 11:38

## 2021-11-20 RX ADMIN — CYANOCOBALAMIN TAB 1000 MCG 1000 MCG: 1000 TAB at 08:51

## 2021-11-20 ASSESSMENT — PAIN SCALES - GENERAL
PAINLEVEL_OUTOF10: 0
PAINLEVEL_OUTOF10: 1

## 2021-11-20 NOTE — PROGRESS NOTES
dialysis  3.  Elevated troponin and BNP, likely related to end-stage renal disease. 4.  Anemia, appears of chronic disease, no signs of bleeding  5.  Right pleural effusion, likely overall due to fluid overload.  Dialysis  6.  Severe mitral stenosis, plan for mechanical valve, cardiology consulted, no change in medication follow-up with primary cardiology and cardiothoracic surgery. Diet: ADULT DIET;  Regular; 2000 ml  Code Status: Full Code        Shweta Lopez MD

## 2021-11-20 NOTE — PROGRESS NOTES
Clinical Pharmacy Note  Heparin Dosing       Lab Results   Component Value Date    APTT 58.1 11/20/2021     Lab Results   Component Value Date    HGB 8.5 11/20/2021    HCT 25.7 11/20/2021     11/20/2021    INR 1.91 11/20/2021       Current Infusion Rate: 1000 units/hr    Plan:  Bolus: 1900 units  Rate: 1130 units/hr  Next aPTT: 1530 on 11-20-21    Pharmacy will continue to monitor and adjust based on aPTT results.   Tharon Harada, [facility]  11/20/2021  9:13 AM

## 2021-11-20 NOTE — PROGRESS NOTES
Clinical Pharmacy Note  Warfarin Consult    Indira Mena is a 64 y.o. male receiving warfarin managed by pharmacy. Patient being bridged with heparin. Warfarin Indication: mitral valve replacement  Target INR range: 2-3   Dose prior to admission: 5 mg daily    Current warfarin drug-drug interactions:      Recent Labs     11/18/21  0625 11/19/21  0531 11/19/21  1330 11/20/21  0605   HGB 8.2* 8.7*  --  8.5*   HCT 24.9* 26.6*  --  25.7*   INR  --   --  1.79* 1.91*       Assessment/Plan:    Warfarin 5 mg tonight. Daily PT/INR until stable within therapeutic range. Thank you for the consult. Will continue to follow.   Unknown Delmi, [facility]

## 2021-11-20 NOTE — PROGRESS NOTES
Office: [phone]       Fax: [phone]      Nephrology Progress Note        Patient's Name: Romero Ahumada  Date of Visit: 11/20/2021    Reason for Consult:  ESRD management    History of Present Illness:      Romero Ahumada is a 64 y.o. male with PMHx of hypertension, IGAN, status post renal txp x2, ESRD who was admitted on 11/17/2021 with complaints of low BP  Had evidence of fluid overload. Dialyzed overnight  Dialysis initiation: 2014  Etiology of ESRD: IgAN  Dialysis Location: [facility]  Schedule: [facility]  Primary Nephrologist: Dr. Rosalina Sampson  EDW: 62 kg  Last Dialysis: 11/17  Access: AVG     INTERVAL HISTORY    Feels better  Shortness of breath: No   tolerating HD      Medications: Allergies:  Patient has no known allergies.   Scheduled Meds:   warfarin (COUMADIN) daily dosing (placeholder)   Other RX Placeholder    sodium chloride flush  10 mL IntraVENous 2 times per day    aspirin  81 mg Oral Daily    atorvastatin  80 mg Oral Daily    cyanocobalamin  1,000 mcg Oral Daily    folic acid  1 mg Oral Daily    gabapentin  100 mg Oral BID    levothyroxine  100 mcg Oral Daily    megestrol  20 mg Oral Daily    predniSONE  5 mg Oral Every Other Day    sevelamer  800 mg Oral TID WC    midodrine  5 mg Oral TID WC     Continuous Infusions:   heparin (PORCINE) Infusion 1,000 Units/hr (11/20/21 0113)    sodium chloride           Objective:       Vitals:  /88   Pulse 88   Temp 98 °F (36.7 °C) (Oral)   Resp 16   Ht 6' 2\" (1.88 m)   Wt 142 lb 3.2 oz (64.5 kg)   SpO2 98%   BMI 18.26 kg/m²   Constitutional:  awake, NAD  HEENT:  MMM, No icterus  Cardiovascular:  S1, S2 reg  Respiratory: CTA, no crackles  Abdomen:  +BS, soft, NT, ND  Ext: no lower extremity edema  Access: LUE AVG  CNS: alert, no agitation    Data:     Labs:  Hepatic:   Recent Labs     11/17/21  1253   AST 22   ALT 59*   BILITOT 0.3   ALKPHOS 129     BNP: No results for input(s): BNP in the last 72 hours. ABGs: No results for input(s): PHART, PO2ART, [id number] in the last 72 hours. IMAGING:  CT HEAD WO CONTRAST   Final Result   Ill-defined hypodensity is seen in the right occipital lobe, new compared to   2012, likely sequelae from remote infarct. Linear areas of increased density   are seen along the cortical surface, likely laminar necrosis. XR CHEST PORTABLE   Final Result   1. Cardiomegaly with probable pulmonary venous hypertension   2. Right pleural effusion and/or pleural thickening             Assessment :       1. ESRD   Etiology: IgAN  Access: AVG  Volume: Euvolemic    2. Electrolytes/Acid base  No Dyskalemia    Recent Labs     11/20/21  0605      K 5.0   CO2 26       3. BMD  -Hyperphosphatemia, SHPT    Lab Results   Component Value Date    .6 (H) 07/21/2016    CALCIUM 9.3 11/20/2021    PHOS 3.8 07/21/2016        4. HTN  -Blood pressure at goal     BP Readings from Last 1 Encounters:   11/20/21 129/88       5. Anemia  -anemia of CKD    Recent Labs     11/20/21  0605   HGB 8.5*     6. MV stenosis  -severe bioprosthetic valve stenosis    7. CAD/CHF  -TTE: EF 30% to 35%. PLAN :       - Maintenance HD MWF. Minimal UF yesterday   - MBD management  - Anemia management  - Dose meds to GFR<10    Thank you for allowing us to participate in care of [facility] . We will continue to follow. Feel free to contact me with any questions.       Butch Hoover MD  11/20/2021    [facility]  [address]  Office : [phone]  Fax :[phone]

## 2021-11-20 NOTE — PLAN OF CARE
Problem: Falls - Risk of:  Goal: Will remain free from falls  Description: Will remain free from falls  11/20/2021 1244 by Rivas Kelley RN  Outcome: Ongoing  11/20/2021 0413 by Prema Thompson RN  Outcome: Ongoing  Goal: Absence of physical injury  Description: Absence of physical injury  11/20/2021 1244 by Rivas Kelley RN  Outcome: Ongoing  11/20/2021 0413 by Prema Thompson RN  Outcome: Ongoing     Problem: Infection:  Goal: Will remain free from infection  Description: Will remain free from infection  11/20/2021 1244 by Rivas Kelley RN  Outcome: Ongoing  11/20/2021 0413 by Prema Thompson RN  Outcome: Ongoing     Problem: Safety:  Goal: Free from accidental physical injury  Description: Free from accidental physical injury  11/20/2021 1244 by Rivas Kelley RN  Outcome: Ongoing  11/20/2021 0413 by Prema Thompson RN  Outcome: Ongoing  Goal: Free from intentional harm  Description: Free from intentional harm  11/20/2021 1244 by Rivas Kelley RN  Outcome: Ongoing  11/20/2021 0413 by Prema Thompson RN  Outcome: Ongoing     Problem: Daily Care:  Goal: Daily care needs are met  Description: Daily care needs are met  11/20/2021 1244 by Rivas Kelley RN  Outcome: Ongoing  11/20/2021 0413 by Prema Thompson RN  Outcome: Ongoing     Problem: Pain:  Goal: Patient's pain/discomfort is manageable  Description: Patient's pain/discomfort is manageable  11/20/2021 1244 by Rivas Kelley RN  Outcome: Ongoing  11/20/2021 0413 by Prema Thompson RN  Outcome: Ongoing     Problem: Skin Integrity:  Goal: Skin integrity will stabilize  Description: Skin integrity will stabilize  11/20/2021 1244 by Rivas Kelley RN  Outcome: Ongoing  11/20/2021 0413 by Prema Thompson RN  Outcome: Ongoing     Problem: Discharge Planning:  Goal: Patients continuum of care needs are met  Description: Patients continuum of care needs are met  11/20/2021 1244 by Rivas Kelley RN  Outcome: Ongoing  11/20/2021 0413 by Dez Pitts Leigh Rodriguez RN  Outcome: Ongoing     Problem: Fluid Volume:  Goal: Will show no signs or symptoms of fluid imbalance  Description: Will show no signs or symptoms of fluid imbalance  11/20/2021 1244 by Lyndon Eugene RN  Outcome: Ongoing  11/20/2021 0413 by Thad Calvin RN  Outcome: Ongoing

## 2021-11-20 NOTE — PROGRESS NOTES
Clinical Pharmacy Note  Heparin Dosing       Lab Results   Component Value Date    APTT 38.5 11/19/2021     Lab Results   Component Value Date    HGB 8.7 11/19/2021    HCT 26.6 11/19/2021     11/19/2021    INR 1.79 11/19/2021       Current Infusion Rate: 760 units/hr    Plan:  Bolus: 3800 units  Rate: increase to 1000 units/hr  Next aPTT: 0700 11/20/21    Pharmacy will continue to monitor and adjust based on aPTT results.   Dick Gates, [facility]

## 2021-11-20 NOTE — PROGRESS NOTES
Patient was a transfer from 4N to 36. VSS. Patient alert and oriented, oriented to room and bed at lowest position and locked. Call light within reach. Patient on a heparin drip per order. Patient on a Telemetry monitor. Will continue to monitor.

## 2021-11-20 NOTE — PLAN OF CARE
Problem: Falls - Risk of:  Goal: Will remain free from falls  Description: Will remain free from falls  11/20/2021 0413 by Codi Ocampo RN  Outcome: Ongoing  Goal: Absence of physical injury  Description: Absence of physical injury  11/20/2021 0413 by Codi Ocampo RN  Outcome: Ongoing    Problem: Infection:  Goal: Will remain free from infection  Description: Will remain free from infection  11/20/2021 0413 by Codi Ocampo RN  Outcome: Ongoing     Problem: Safety:  Goal: Free from accidental physical injury  Description: Free from accidental physical injury  11/20/2021 0413 by Codi Ocampo RN  Outcome: Ongoing  Goal: Free from intentional harm  Description: Free from intentional harm  11/20/2021 0413 by Codi Ocampo RN  Outcome: Ongoing     Problem: Daily Care:  Goal: Daily care needs are met  Description: Daily care needs are met  11/20/2021 0413 by Codi Ocampo RN  Outcome: Ongoing     Problem: Pain:  Goal: Patient's pain/discomfort is manageable  Description: Patient's pain/discomfort is manageable  11/20/2021 0413 by Codi Ocampo RN  Outcome: Ongoing    Problem: Skin Integrity:  Goal: Skin integrity will stabilize  Description: Skin integrity will stabilize  11/20/2021 0413 by Codi Ocampo RN  Outcome: Ongoing    Problem: Discharge Planning:  Goal: Patients continuum of care needs are met  Description: Patients continuum of care needs are met  11/20/2021 0413 by Codi Ocampo RN  Outcome: Ongoing    Problem: Fluid Volume:  Goal: Will show no signs or symptoms of fluid imbalance  Description: Will show no signs or symptoms of fluid imbalance  11/20/2021 0413 by Codi Ocampo RN  Outcome: Ongoing

## 2021-11-20 NOTE — PROGRESS NOTES
Clinical Pharmacy Note  Heparin Dosing       Lab Results   Component Value Date    APTT 50.4 11/20/2021     Lab Results   Component Value Date    HGB 8.5 11/20/2021    HCT 25.7 11/20/2021     11/20/2021    INR 1.98 11/20/2021       Current Infusion Rate: 1130 units/hr    Plan:  Bolus: 1900 units  Rate: 1260 units/hr  Next aPTT: 0100    Pharmacy will continue to monitor and adjust based on aPTT results.

## 2021-11-20 NOTE — PLAN OF CARE
Problem: Falls - Risk of:  Goal: Will remain free from falls  Description: Will remain free from falls  11/19/2021 2057 by uBrak Matias RN  Outcome: Ongoing     Problem: Falls - Risk of:  Goal: Absence of physical injury  Description: Absence of physical injury  11/19/2021 2057 by Burak Matias RN  Outcome: Ongoing     Problem: Infection:  Goal: Will remain free from infection  Description: Will remain free from infection  11/19/2021 2057 by Burak Matias RN  Outcome: Ongoing     Problem: Safety:  Goal: Free from accidental physical injury  Description: Free from accidental physical injury  11/19/2021 2057 by Burak Matias RN  Outcome: Ongoing     Problem: Safety:  Goal: Free from intentional harm  Description: Free from intentional harm  11/19/2021 2057 by Burak Matias RN  Outcome: Ongoing     Problem: Daily Care:  Goal: Daily care needs are met  Description: Daily care needs are met  11/19/2021 2057 by Burak Matias RN  Outcome: Ongoing     Problem: Pain:  Goal: Patient's pain/discomfort is manageable  Description: Patient's pain/discomfort is manageable  11/19/2021 2057 by Burak Matias RN  Outcome: Ongoing     Problem: Skin Integrity:  Goal: Skin integrity will stabilize  Description: Skin integrity will stabilize  11/19/2021 2057 by Burak Matias RN  Outcome: Ongoing     Problem: Discharge Planning:  Goal: Patients continuum of care needs are met  Description: Patients continuum of care needs are met  11/19/2021 2057 by Burak Matias RN  Outcome: Ongoing     Problem: Fluid Volume:  Goal: Will show no signs or symptoms of fluid imbalance  Description: Will show no signs or symptoms of fluid imbalance  11/19/2021 2057 by Burak Matias RN  Outcome: Ongoing

## 2021-11-21 LAB
ANION GAP SERPL CALCULATED.3IONS-SCNC: 16 MMOL/L (ref 3–16)
APTT: 104.7 SEC (ref 26.2–38.6)
APTT: 56.6 SEC (ref 26.2–38.6)
APTT: 75.8 SEC (ref 26.2–38.6)
BLOOD CULTURE, ROUTINE: NORMAL
BUN BLDV-MCNC: 53 MG/DL (ref 7–20)
CALCIUM SERPL-MCNC: 9.5 MG/DL (ref 8.3–10.6)
CHLORIDE BLD-SCNC: 93 MMOL/L (ref 99–110)
CO2: 26 MMOL/L (ref 21–32)
CREAT SERPL-MCNC: 7.4 MG/DL (ref 0.9–1.3)
CULTURE, BLOOD 2: NORMAL
GFR AFRICAN AMERICAN: 9
GFR NON-AFRICAN AMERICAN: 8
GLUCOSE BLD-MCNC: 78 MG/DL (ref 70–99)
HCT VFR BLD CALC: 24.7 % (ref 40.5–52.5)
HEMATOLOGY PATH CONSULT: NO
HEMOGLOBIN: 8 G/DL (ref 13.5–17.5)
INR BLD: 1.85 (ref 0.88–1.12)
INR BLD: 1.96 (ref 0.88–1.12)
MCH RBC QN AUTO: 36.9 PG (ref 26–34)
MCHC RBC AUTO-ENTMCNC: 32.6 G/DL (ref 31–36)
MCV RBC AUTO: 113.3 FL (ref 80–100)
PDW BLD-RTO: 19.2 % (ref 12.4–15.4)
PLATELET # BLD: 157 K/UL (ref 135–450)
PMV BLD AUTO: 9.5 FL (ref 5–10.5)
POTASSIUM REFLEX MAGNESIUM: 5 MMOL/L (ref 3.5–5.1)
PROTHROMBIN TIME: 21.5 SEC (ref 9.9–12.7)
PROTHROMBIN TIME: 22.8 SEC (ref 9.9–12.7)
RBC # BLD: 2.18 M/UL (ref 4.2–5.9)
SODIUM BLD-SCNC: 135 MMOL/L (ref 136–145)
WBC # BLD: 5.7 K/UL (ref 4–11)

## 2021-11-21 PROCEDURE — 2580000003 HC RX 258: Performed by: INTERNAL MEDICINE

## 2021-11-21 PROCEDURE — 6360000002 HC RX W HCPCS

## 2021-11-21 PROCEDURE — 36415 COLL VENOUS BLD VENIPUNCTURE: CPT

## 2021-11-21 PROCEDURE — 6370000000 HC RX 637 (ALT 250 FOR IP): Performed by: INTERNAL MEDICINE

## 2021-11-21 PROCEDURE — 99233 SBSQ HOSP IP/OBS HIGH 50: CPT | Performed by: INTERNAL MEDICINE

## 2021-11-21 PROCEDURE — 90935 HEMODIALYSIS ONE EVALUATION: CPT

## 2021-11-21 PROCEDURE — 85610 PROTHROMBIN TIME: CPT

## 2021-11-21 PROCEDURE — 1200000000 HC SEMI PRIVATE

## 2021-11-21 PROCEDURE — 85027 COMPLETE CBC AUTOMATED: CPT

## 2021-11-21 PROCEDURE — 85730 THROMBOPLASTIN TIME PARTIAL: CPT

## 2021-11-21 PROCEDURE — 80048 BASIC METABOLIC PNL TOTAL CA: CPT

## 2021-11-21 RX ORDER — HEPARIN SODIUM 1000 [USP'U]/ML
1900 INJECTION, SOLUTION INTRAVENOUS; SUBCUTANEOUS ONCE
Status: COMPLETED | OUTPATIENT
Start: 2021-11-21 | End: 2021-11-21

## 2021-11-21 RX ORDER — WARFARIN SODIUM 7.5 MG/1
7.5 TABLET ORAL ONCE
Status: DISCONTINUED | OUTPATIENT
Start: 2021-11-21 | End: 2021-11-22 | Stop reason: HOSPADM

## 2021-11-21 RX ADMIN — MIDODRINE HYDROCHLORIDE 5 MG: 5 TABLET ORAL at 20:10

## 2021-11-21 RX ADMIN — SEVELAMER CARBONATE 800 MG: 800 TABLET, FILM COATED ORAL at 09:17

## 2021-11-21 RX ADMIN — FOLIC ACID 1 MG: 1 TABLET ORAL at 09:11

## 2021-11-21 RX ADMIN — PREDNISONE 5 MG: 5 TABLET ORAL at 09:11

## 2021-11-21 RX ADMIN — MIDODRINE HYDROCHLORIDE 5 MG: 5 TABLET ORAL at 09:17

## 2021-11-21 RX ADMIN — SEVELAMER CARBONATE 800 MG: 800 TABLET, FILM COATED ORAL at 20:10

## 2021-11-21 RX ADMIN — CYANOCOBALAMIN TAB 1000 MCG 1000 MCG: 1000 TAB at 09:11

## 2021-11-21 RX ADMIN — CARVEDILOL 25 MG: 25 TABLET, FILM COATED ORAL at 09:11

## 2021-11-21 RX ADMIN — CARVEDILOL 25 MG: 25 TABLET, FILM COATED ORAL at 20:10

## 2021-11-21 RX ADMIN — ASPIRIN 81 MG: 81 TABLET, CHEWABLE ORAL at 09:11

## 2021-11-21 RX ADMIN — ISOSORBIDE MONONITRATE 30 MG: 30 TABLET, EXTENDED RELEASE ORAL at 09:11

## 2021-11-21 RX ADMIN — LEVOTHYROXINE SODIUM 100 MCG: 0.1 TABLET ORAL at 09:17

## 2021-11-21 RX ADMIN — SODIUM CHLORIDE, PRESERVATIVE FREE 10 ML: 5 INJECTION INTRAVENOUS at 20:21

## 2021-11-21 RX ADMIN — SPIRONOLACTONE 25 MG: 25 TABLET ORAL at 09:11

## 2021-11-21 RX ADMIN — GABAPENTIN 100 MG: 100 CAPSULE ORAL at 20:18

## 2021-11-21 RX ADMIN — SACUBITRIL AND VALSARTAN 1 TABLET: 24; 26 TABLET, FILM COATED ORAL at 09:11

## 2021-11-21 RX ADMIN — ATORVASTATIN CALCIUM 80 MG: 80 TABLET, FILM COATED ORAL at 09:11

## 2021-11-21 RX ADMIN — MIDODRINE HYDROCHLORIDE 5 MG: 5 TABLET ORAL at 11:24

## 2021-11-21 RX ADMIN — HEPARIN SODIUM 1900 UNITS: 1000 INJECTION INTRAVENOUS; SUBCUTANEOUS at 11:24

## 2021-11-21 RX ADMIN — SACUBITRIL AND VALSARTAN 1 TABLET: 24; 26 TABLET, FILM COATED ORAL at 20:18

## 2021-11-21 RX ADMIN — GABAPENTIN 100 MG: 100 CAPSULE ORAL at 09:10

## 2021-11-21 ASSESSMENT — PAIN SCALES - GENERAL
PAINLEVEL_OUTOF10: 0

## 2021-11-21 NOTE — PROGRESS NOTES
Clinical Pharmacy Note  Heparin Dosing       Lab Results   Component Value Date    APTT 75.8 11/21/2021     Lab Results   Component Value Date    HGB 8.5 11/20/2021    HCT 25.7 11/20/2021     11/20/2021    INR 1.98 11/20/2021       Current Infusion Rate: 1260 units/hr    Plan:  Rate: continue at 1260 units/hr  Next aPTT: 0900 11/21/21    Pharmacy will continue to monitor and adjust based on aPTT results.   Juany Gonzalez, PharmD

## 2021-11-21 NOTE — PROGRESS NOTES
Clinical Pharmacy Note  Heparin Dosing       Lab Results   Component Value Date    APTT 56.6 11/21/2021     Lab Results   Component Value Date    HGB 8.0 11/21/2021    HCT 24.7 11/21/2021     11/21/2021    INR 1.96 11/21/2021       Current Infusion Rate: 1260 units/hr    Plan:  Bolus: 1900 units  Rate: 1390 units/hr  Next aPTT: 1630 on 11-21-21    Pharmacy will continue to monitor and adjust based on aPTT results.   Crispin Jenkins, [facility]  11/21/2021  10:29 AM

## 2021-11-21 NOTE — PROGRESS NOTES
Clinical Pharmacy Note  Warfarin Consult    Indira Mena is a 64 y.o. male receiving warfarin managed by pharmacy. Patient being bridged with heparin. Warfarin Indication: mitral valve replacement  Target INR range: 2-3   Dose prior to admission: 5 mg daily    Current warfarin drug-drug interactions:      Recent Labs     11/19/21  0531 11/19/21  1330 11/20/21  0605 11/20/21  0925 11/21/21  0530   HGB 8.7*  --  8.5*  --  8.0*   HCT 26.6*  --  25.7*  --  24.7*   INR  --    < > 1.91* 1.98* 1.85*    < > = values in this interval not displayed. Assessment/Plan:    INR remains subtherapeutic at 1.85 today. Will give warfarin 7.5 mg tonight. Daily PT/INR until stable within therapeutic range. Thank you for the consult. Will continue to follow.   Unknown Delmi [facility]

## 2021-11-21 NOTE — PROGRESS NOTES
Office: [phone]       Fax: [phone]      Nephrology Progress Note        Patient's Name: Winter Ornelas  Date of Visit: 11/21/2021    Reason for Consult:  ESRD management    History of Present Illness:      Winter Ornelas is a 64 y.o. male with PMHx of hypertension, IGAN, status post renal txp x2, ESRD who was admitted on 11/17/2021 with complaints of low BP  Had evidence of fluid overload. Dialyzed overnight  Dialysis initiation: 2014  Etiology of ESRD: IgAN  Dialysis Location: [facility]  Schedule: [facility]  Primary Nephrologist: Dr. Dia Orta  EDW: 62 kg  Last Dialysis: 11/17  Access: AVG     INTERVAL HISTORY    Feels better  Shortness of breath: No   tolerating HD      Medications: Allergies:  Patient has no known allergies.   Scheduled Meds:   carvedilol  25 mg Oral BID WC    isosorbide mononitrate  30 mg Oral Daily    sacubitril-valsartan  1 tablet Oral BID    spironolactone  25 mg Oral Daily    warfarin (COUMADIN) daily dosing (placeholder)   Other RX Placeholder    sodium chloride flush  10 mL IntraVENous 2 times per day    aspirin  81 mg Oral Daily    atorvastatin  80 mg Oral Daily    cyanocobalamin  1,000 mcg Oral Daily    folic acid  1 mg Oral Daily    gabapentin  100 mg Oral BID    levothyroxine  100 mcg Oral Daily    megestrol  20 mg Oral Daily    predniSONE  5 mg Oral Every Other Day    sevelamer  800 mg Oral TID WC    midodrine  5 mg Oral TID WC     Continuous Infusions:   heparin (PORCINE) Infusion 1,260 Units/hr (11/20/21 1955)    sodium chloride           Objective:       Vitals:  /79   Pulse 69   Temp 97.9 °F (36.6 °C) (Oral)   Resp 17   Ht 6' 2\" (1.88 m)   Wt 142 lb 6.7 oz (64.6 kg)   SpO2 100%   BMI 18.29 kg/m²   Constitutional:  awake, NAD  HEENT:  MMM, No icterus  Cardiovascular:  S1, S2 reg  Respiratory: CTA, no crackles  Abdomen:  +BS, soft, NT, ND  Ext: no lower extremity edema  Access: LUE AVG  CNS: alert, no agitation    Data:     Labs:  Hepatic:   No results for input(s): AST, ALT, ALB, BILITOT, ALKPHOS in the last 72 hours. BNP: No results for input(s): BNP in the last 72 hours. ABGs: No results for input(s): PHART, PO2ART, AUD9ANX in the last 72 hours. IMAGING:  CT HEAD WO CONTRAST   Final Result   Ill-defined hypodensity is seen in the right occipital lobe, new compared to   2012, likely sequelae from remote infarct. Linear areas of increased density   are seen along the cortical surface, likely laminar necrosis. XR CHEST PORTABLE   Final Result   1. Cardiomegaly with probable pulmonary venous hypertension   2. Right pleural effusion and/or pleural thickening             Assessment :       1. ESRD   Etiology: IgAN  Access: AVG  Volume: Euvolemic    2. Electrolytes/Acid base  No Dyskalemia    Recent Labs     11/21/21  0530   *   K 5.0   CO2 26       3. BMD  -Hyperphosphatemia, SHPT    Lab Results   Component Value Date    .6 (H) 07/21/2016    CALCIUM 9.5 11/21/2021    PHOS 3.8 07/21/2016        4. HTN  -Blood pressure at goal     BP Readings from Last 1 Encounters:   11/21/21 121/79       5. Anemia  -anemia of CKD    Recent Labs     11/21/21  0530   HGB 8.0*     6. MV stenosis  -severe bioprosthetic valve stenosis    7. CAD/CHF  -TTE: EF 30% to 35%. PLAN :       - Maintenance HD MWF. Minimal UF   - HD today per holiday schedule   - MBD management  - Anemia management  - Dose meds to GFR<10    Thank you for allowing us to participate in care of [facility] . We will continue to follow. Feel free to contact me with any questions.       hWitney Harrison MD  11/21/2021    [facility] [address]  Office : [phone]  Fax :[phone]

## 2021-11-21 NOTE — PLAN OF CARE
Problem: Falls - Risk of:  Goal: Will remain free from falls  Description: Will remain free from falls  11/21/2021 1242 by George Espino RN  Outcome: Ongoing  11/21/2021 0657 by Delores Andrews RN  Outcome: Ongoing  Goal: Absence of physical injury  Description: Absence of physical injury  11/21/2021 1242 by George Epsino RN  Outcome: Ongoing  11/21/2021 0657 by Delores Andrews RN  Outcome: Ongoing     Problem: Infection:  Goal: Will remain free from infection  Description: Will remain free from infection  11/21/2021 1242 by George Espino RN  Outcome: Ongoing  11/21/2021 0657 by Delores Andrews RN  Outcome: Ongoing     Problem: Safety:  Goal: Free from accidental physical injury  Description: Free from accidental physical injury  11/21/2021 1242 by George Espino RN  Outcome: Ongoing  11/21/2021 0657 by Delores Andrews RN  Outcome: Ongoing  Goal: Free from intentional harm  Description: Free from intentional harm  11/21/2021 1242 by George Espino RN  Outcome: Ongoing  11/21/2021 0657 by Delores Andrews RN  Outcome: Ongoing     Problem: Daily Care:  Goal: Daily care needs are met  Description: Daily care needs are met  11/21/2021 1242 by George Espino RN  Outcome: Ongoing  11/21/2021 0657 by Delores Andrews RN  Outcome: Ongoing     Problem: Pain:  Goal: Patient's pain/discomfort is manageable  Description: Patient's pain/discomfort is manageable  11/21/2021 1242 by George Espino RN  Outcome: Ongoing  11/21/2021 0657 by Delores Andrews RN  Outcome: Ongoing     Problem: Skin Integrity:  Goal: Skin integrity will stabilize  Description: Skin integrity will stabilize  11/21/2021 1242 by George Espino RN  Outcome: Ongoing  11/21/2021 0657 by Delores Andrews RN  Outcome: Ongoing     Problem: Discharge Planning:  Goal: Patients continuum of care needs are met  Description: Patients continuum of care needs are met  11/21/2021 1242 by George Espino RN  Outcome: Ongoing  11/21/2021 0657 by Tram Chang Lg Hassan RN  Outcome: Ongoing     Problem: Fluid Volume:  Goal: Will show no signs or symptoms of fluid imbalance  Description: Will show no signs or symptoms of fluid imbalance  11/21/2021 1242 by Ayad Hinojosa RN  Outcome: Ongoing  11/21/2021 0657 by Luma Mustafa RN  Outcome: Ongoing

## 2021-11-21 NOTE — PROGRESS NOTES
Peripheral Pulses: +2 palpable, equal bilaterally       Labs:   Recent Labs     11/19/21  0531 11/20/21  0605 11/21/21  0530   WBC 5.1 5.4 5.7   HGB 8.7* 8.5* 8.0*   HCT 26.6* 25.7* 24.7*    162 157     Recent Labs     11/19/21  0531 11/20/21  0605 11/21/21  0530   * 136 135*   K 5.1 5.0 5.0   CL 93* 94* 93*   CO2 26 26 26   BUN 38* 41* 53*   CREATININE 6.4* 6.1* 7.4*   CALCIUM 9.1 9.3 9.5     No results for input(s): AST, ALT, BILIDIR, BILITOT, ALKPHOS in the last 72 hours. Recent Labs     11/20/21  0605 11/20/21  0925 11/21/21  0530   INR 1.91* 1.98* 1.85*     No results for input(s): CKTOTAL, TROPONINI in the last 72 hours. Urinalysis:      Lab Results   Component Value Date    NITRU Negative 07/21/2016    WBCUA 0-3 06/25/2012    RBCUA <1 /HPF 07/21/2016    BLOODU TRACE 06/25/2012    SPECGRAV 1.014 07/21/2016    GLUCOSEU Trace 07/21/2016       Radiology:  CT HEAD WO CONTRAST   Final Result   Ill-defined hypodensity is seen in the right occipital lobe, new compared to   2012, likely sequelae from remote infarct. Linear areas of increased density   are seen along the cortical surface, likely laminar necrosis. XR CHEST PORTABLE   Final Result   1. Cardiomegaly with probable pulmonary venous hypertension   2. Right pleural effusion and/or pleural thickening                 Assessment/Plan:    Active Hospital Problems    Diagnosis     ESRD (end stage renal disease) ([facility] [address] [N18.6]      1.  Acute metabolic encephalopathy, uremia in differential, nephrology consulted CT head with hypodensity in right occipital lobe new compared to 2012 but appears like a remote infarct. to note that patient had a recent stroke.  Discussed with cardiology multiple times during this admission, noted some note from previous admission suspecting etiology as embolic, INR was not therapeutic, to note that based on cardiology recommendation patient was started on heparin bridge and heparin can be stopped when INR above 2. INR 1.85 this am.   2.  End-stage renal disease, nephrology consulted dialysis  3.  Elevated troponin and BNP, likely related to end-stage renal disease. 4.  Anemia, appears of chronic disease, no signs of bleeding  5.  Right pleural effusion, likely overall due to fluid overload.  Dialysis  6.  Severe mitral stenosis, plan for mechanical valve, cardiology consulted, no change in medication follow-up with primary cardiology and cardiothoracic surgery. Diet: ADULT DIET;  Regular; 2000 ml  Code Status: Full Code        Roselia Garcia MD

## 2021-11-21 NOTE — PROGRESS NOTES
Clinical Pharmacy Note  Heparin Dosing       Lab Results   Component Value Date    APTT 104.7 11/21/2021     Lab Results   Component Value Date    HGB 8.0 11/21/2021    HCT 24.7 11/21/2021     11/21/2021    INR 1.96 11/21/2021       Current Infusion Rate: 1390 units/hr    Plan:  Rate: 1260  units/hr  Next aPTT: 0000  11/22/21    Pharmacy will continue to monitor and adjust based on aPTT results.

## 2021-11-21 NOTE — PLAN OF CARE
Problem: Falls - Risk of:  Goal: Will remain free from falls  Description: Will remain free from falls  Outcome: Ongoing  Goal: Absence of physical injury  Description: Absence of physical injury  Outcome: Ongoing     Problem: Infection:  Goal: Will remain free from infection  Description: Will remain free from infection  Outcome: Ongoing     Problem: Safety:  Goal: Free from accidental physical injury  Description: Free from accidental physical injury  Outcome: Ongoing  Goal: Free from intentional harm  Description: Free from intentional harm  Outcome: Ongoing     Problem: Daily Care:  Goal: Daily care needs are met  Description: Daily care needs are met  Outcome: Ongoing     Problem: Pain:  Goal: Patient's pain/discomfort is manageable  Description: Patient's pain/discomfort is manageable  Outcome: Ongoing     Problem: Skin Integrity:  Goal: Skin integrity will stabilize  Description: Skin integrity will stabilize  Outcome: Ongoing     Problem: Discharge Planning:  Goal: Patients continuum of care needs are met  Description: Patients continuum of care needs are met  Outcome: Ongoing     Problem: Fluid Volume:  Goal: Will show no signs or symptoms of fluid imbalance  Description: Will show no signs or symptoms of fluid imbalance  Outcome: Ongoing

## 2021-11-21 NOTE — FLOWSHEET NOTE
11/21/21 1359 11/21/21 1659   Vital Signs   BP 93/64 (!) 152/90   Temp 98.6 °F (37 °C) 98.5 °F (36.9 °C)   Pulse 74 80   Resp 18 18   Weight 142 lb 3.2 oz (64.5 kg) 136 lb 14.5 oz (62.1 kg)   Weight Method Standing scale Standing scale   Dry Weight 136 lb 11 oz (62 kg)  --    Post-Hemodialysis Assessment   NET Removed (ml)  --  2000 ml   Treatment time: 3 hours  Net UF: 2000 ml    Pre weight: 64.5 kg   Post weight: 62.1 kg  EDW: 62 kg    Access used: PEDRITO AVG  Access function: good with  ml/min    Medications or blood products given: none    Regular outpatient schedule: Wendy CHILDERS    Summary of response to treatment: good    Copy of dialysis treatment record placed in chart, to be scanned into EMR.

## 2021-11-22 VITALS
RESPIRATION RATE: 16 BRPM | HEART RATE: 74 BPM | HEIGHT: 74 IN | WEIGHT: 136.91 LBS | BODY MASS INDEX: 17.57 KG/M2 | SYSTOLIC BLOOD PRESSURE: 124 MMHG | TEMPERATURE: 98 F | OXYGEN SATURATION: 100 % | DIASTOLIC BLOOD PRESSURE: 61 MMHG

## 2021-11-22 LAB
ANION GAP SERPL CALCULATED.3IONS-SCNC: 11 MMOL/L (ref 3–16)
APTT: 40 SEC (ref 26.2–38.6)
BUN BLDV-MCNC: 33 MG/DL (ref 7–20)
CALCIUM SERPL-MCNC: 8.9 MG/DL (ref 8.3–10.6)
CHLORIDE BLD-SCNC: 97 MMOL/L (ref 99–110)
CO2: 29 MMOL/L (ref 21–32)
CREAT SERPL-MCNC: 5.4 MG/DL (ref 0.9–1.3)
GFR AFRICAN AMERICAN: 13
GFR NON-AFRICAN AMERICAN: 11
GLUCOSE BLD-MCNC: 99 MG/DL (ref 70–99)
HCT VFR BLD CALC: 22.7 % (ref 40.5–52.5)
HCT VFR BLD CALC: 25.7 % (ref 40.5–52.5)
HEMATOLOGY PATH CONSULT: NO
HEMATOLOGY PATH CONSULT: NO
HEMOGLOBIN: 7.3 G/DL (ref 13.5–17.5)
HEMOGLOBIN: 8.5 G/DL (ref 13.5–17.5)
INR BLD: 2.4 (ref 0.88–1.12)
MCH RBC QN AUTO: 36.8 PG (ref 26–34)
MCH RBC QN AUTO: 37.3 PG (ref 26–34)
MCHC RBC AUTO-ENTMCNC: 32.3 G/DL (ref 31–36)
MCHC RBC AUTO-ENTMCNC: 33 G/DL (ref 31–36)
MCV RBC AUTO: 113.1 FL (ref 80–100)
MCV RBC AUTO: 113.8 FL (ref 80–100)
PDW BLD-RTO: 19 % (ref 12.4–15.4)
PDW BLD-RTO: 19.3 % (ref 12.4–15.4)
PLATELET # BLD: 158 K/UL (ref 135–450)
PLATELET # BLD: 162 K/UL (ref 135–450)
PMV BLD AUTO: 9 FL (ref 5–10.5)
PMV BLD AUTO: 9.3 FL (ref 5–10.5)
POTASSIUM REFLEX MAGNESIUM: 4.1 MMOL/L (ref 3.5–5.1)
PROTHROMBIN TIME: 28.1 SEC (ref 9.9–12.7)
RBC # BLD: 2 M/UL (ref 4.2–5.9)
RBC # BLD: 2.27 M/UL (ref 4.2–5.9)
SODIUM BLD-SCNC: 137 MMOL/L (ref 136–145)
WBC # BLD: 5.4 K/UL (ref 4–11)
WBC # BLD: 6.5 K/UL (ref 4–11)

## 2021-11-22 PROCEDURE — 6370000000 HC RX 637 (ALT 250 FOR IP): Performed by: INTERNAL MEDICINE

## 2021-11-22 PROCEDURE — 85610 PROTHROMBIN TIME: CPT

## 2021-11-22 PROCEDURE — 85730 THROMBOPLASTIN TIME PARTIAL: CPT

## 2021-11-22 PROCEDURE — 85027 COMPLETE CBC AUTOMATED: CPT

## 2021-11-22 PROCEDURE — 2500000003 HC RX 250 WO HCPCS: Performed by: INTERNAL MEDICINE

## 2021-11-22 PROCEDURE — 2580000003 HC RX 258: Performed by: INTERNAL MEDICINE

## 2021-11-22 PROCEDURE — 99232 SBSQ HOSP IP/OBS MODERATE 35: CPT | Performed by: HOSPITALIST

## 2021-11-22 PROCEDURE — 80048 BASIC METABOLIC PNL TOTAL CA: CPT

## 2021-11-22 PROCEDURE — 6360000002 HC RX W HCPCS: Performed by: INTERNAL MEDICINE

## 2021-11-22 RX ORDER — MIDODRINE HYDROCHLORIDE 5 MG/1
5 TABLET ORAL
Qty: 90 TABLET | Refills: 1 | Status: SHIPPED | OUTPATIENT
Start: 2021-11-22

## 2021-11-22 RX ORDER — WARFARIN SODIUM 5 MG/1
5 TABLET ORAL
Status: COMPLETED | OUTPATIENT
Start: 2021-11-22 | End: 2021-11-22

## 2021-11-22 RX ORDER — HEPARIN SODIUM 1000 [USP'U]/ML
1900 INJECTION, SOLUTION INTRAVENOUS; SUBCUTANEOUS ONCE
Status: COMPLETED | OUTPATIENT
Start: 2021-11-22 | End: 2021-11-22

## 2021-11-22 RX ADMIN — SODIUM CHLORIDE, PRESERVATIVE FREE 10 ML: 5 INJECTION INTRAVENOUS at 08:27

## 2021-11-22 RX ADMIN — FOLIC ACID 1 MG: 1 TABLET ORAL at 08:22

## 2021-11-22 RX ADMIN — MIDODRINE HYDROCHLORIDE 5 MG: 5 TABLET ORAL at 12:56

## 2021-11-22 RX ADMIN — ISOSORBIDE MONONITRATE 30 MG: 30 TABLET, EXTENDED RELEASE ORAL at 08:22

## 2021-11-22 RX ADMIN — SACUBITRIL AND VALSARTAN 1 TABLET: 24; 26 TABLET, FILM COATED ORAL at 08:22

## 2021-11-22 RX ADMIN — CARVEDILOL 25 MG: 25 TABLET, FILM COATED ORAL at 08:22

## 2021-11-22 RX ADMIN — HEPARIN SODIUM 1900 UNITS: 1000 INJECTION INTRAVENOUS; SUBCUTANEOUS at 02:29

## 2021-11-22 RX ADMIN — SPIRONOLACTONE 25 MG: 25 TABLET ORAL at 08:22

## 2021-11-22 RX ADMIN — ATORVASTATIN CALCIUM 80 MG: 80 TABLET, FILM COATED ORAL at 08:22

## 2021-11-22 RX ADMIN — WARFARIN SODIUM 5 MG: 5 TABLET ORAL at 16:43

## 2021-11-22 RX ADMIN — HEPARIN SODIUM 21.87 UNITS/KG/HR: 10000 INJECTION, SOLUTION INTRAVENOUS at 02:32

## 2021-11-22 RX ADMIN — LEVOTHYROXINE SODIUM 100 MCG: 0.1 TABLET ORAL at 06:16

## 2021-11-22 RX ADMIN — SEVELAMER CARBONATE 800 MG: 800 TABLET, FILM COATED ORAL at 08:22

## 2021-11-22 RX ADMIN — MEGESTROL ACETATE 20 MG: 40 TABLET ORAL at 08:23

## 2021-11-22 RX ADMIN — MIDODRINE HYDROCHLORIDE 5 MG: 5 TABLET ORAL at 16:43

## 2021-11-22 RX ADMIN — ASPIRIN 81 MG: 81 TABLET, CHEWABLE ORAL at 08:21

## 2021-11-22 RX ADMIN — MIDODRINE HYDROCHLORIDE 5 MG: 5 TABLET ORAL at 08:22

## 2021-11-22 RX ADMIN — GABAPENTIN 100 MG: 100 CAPSULE ORAL at 08:22

## 2021-11-22 RX ADMIN — SEVELAMER CARBONATE 800 MG: 800 TABLET, FILM COATED ORAL at 12:56

## 2021-11-22 RX ADMIN — CYANOCOBALAMIN TAB 1000 MCG 1000 MCG: 1000 TAB at 08:21

## 2021-11-22 ASSESSMENT — PAIN SCALES - GENERAL
PAINLEVEL_OUTOF10: 0

## 2021-11-22 NOTE — PLAN OF CARE
Problem: Falls - Risk of:  Goal: Will remain free from falls  Description: Will remain free from falls  11/22/2021 1433 by Samra Ponce RN  Outcome: Met This Shift  11/22/2021 0310 by Prem Gilmore RN  Outcome: Ongoing  Goal: Absence of physical injury  Description: Absence of physical injury  11/22/2021 1433 by Samra Ponce RN  Outcome: Met This Shift  11/22/2021 0310 by Prem Gilmore RN  Outcome: Ongoing     Problem: Infection:  Goal: Will remain free from infection  Description: Will remain free from infection  11/22/2021 1433 by Samra Ponce RN  Outcome: Met This Shift  11/22/2021 0310 by Prem Gilmore RN  Outcome: Ongoing     Problem: Safety:  Goal: Free from accidental physical injury  Description: Free from accidental physical injury  11/22/2021 1433 by Samra Ponce RN  Outcome: Met This Shift  11/22/2021 0310 by Prem Gilmore RN  Outcome: Ongoing  Goal: Free from intentional harm  Description: Free from intentional harm  11/22/2021 1433 by Samra Ponce RN  Outcome: Met This Shift  11/22/2021 0310 by Prem Gilmore RN  Outcome: Ongoing     Problem: Daily Care:  Goal: Daily care needs are met  Description: Daily care needs are met  11/22/2021 1433 by Samra Ponce RN  Outcome: Met This Shift  11/22/2021 0310 by Prem Gilmore RN  Outcome: Ongoing     Problem: Pain:  Goal: Patient's pain/discomfort is manageable  Description: Patient's pain/discomfort is manageable  11/22/2021 1433 by Samra Ponce RN  Outcome: Met This Shift  11/22/2021 0310 by Prem Gilmore RN  Outcome: Ongoing     Problem: Skin Integrity:  Goal: Skin integrity will stabilize  Description: Skin integrity will stabilize  11/22/2021 1433 by Samra Ponce RN  Outcome: Met This Shift  11/22/2021 0310 by Prem Gilmore RN  Outcome: Ongoing     Problem: Discharge Planning:  Goal: Patients continuum of care needs are met  Description: Patients continuum of care needs are met  11/22/2021 1433 by Samra Ponce

## 2021-11-22 NOTE — PROGRESS NOTES
Physician Progress Note      Lorna Rosen  [facility] #:                  [id number]  :                       1965  ADMIT DATE:       2021 11:41 AM  DISCH DATE:  RESPONDING  PROVIDER #:        Jena Ortiz MD          QUERY TEXT:    Pt admitted with hypotension and has CHF documented. If possible, please   document in progress notes and discharge summary further specificity regarding   the type and acuity of CHF:    The medical record reflects the following:  Risk Factors: HX- CHF, HTN, ESRD  Clinical Indicators: BNP 32,344, BUN 37, CRT 7.4, GFR 9. CXR-1. Cardiomegaly   with probable pulmonary venous hypertension   2. Right pleural effusion and/or   pleural thickening     Per Nephrology consult note 21-CAD/CHF  -TTE: EF   30% to 35%. Treatment: Hemodialysis, Nephrology consult. Thank Caesar Schafer RN BSN CDS [email or web address]  [email or web address]  Options provided:  -- Acute on Chronic Systolic CHF/HFrEF  -- Acute on Chronic Diastolic CHF/HFpEF  -- Acute on Chronic Systolic and Diastolic CHF  -- Acute Systolic CHF/HFrEF  -- Acute Diastolic CHF/HFpEF  -- Acute Systolic and Diastolic CHF  -- Chronic Systolic CHF/HFrEF  -- Chronic Diastolic CHF/HFpEF  -- Chronic Systolic and Diastolic CHF  -- Other - I will add my own diagnosis  -- Disagree - Not applicable / Not valid  -- Disagree - Clinically unable to determine / Unknown  -- Refer to Clinical Documentation Reviewer    PROVIDER RESPONSE TEXT:    This patient is in acute on chronic systolic CHF/HFrEF.     Query created by: Tameka Walker on 2021 8:51 AM      Electronically signed by:  Jena Ortiz MD 2021 11:19 AM

## 2021-11-22 NOTE — PROGRESS NOTES
Office: [phone]       Fax: [phone]      Nephrology Progress Note        Patient's Name: Ned Garcia  Date of Visit: 11/22/2021    Reason for Consult:  ESRD management    History of Present Illness:      Ned Garcia is a 64 y.o. male with PMHx of hypertension, IGAN, status post renal txp x2, ESRD who was admitted on 11/17/2021 with complaints of low BP  Had evidence of fluid overload. Dialyzed overnight  Dialysis initiation: 2014  Etiology of ESRD: IgAN  Dialysis Location: [facility]  Schedule: [facility]  Primary Nephrologist: Dr. Oziel Rodríguez  EDW: 62 kg  Last Dialysis: 11/17  Access: AVG     INTERVAL HISTORY    Feels better  Shortness of breath: No   tolerating HD      Medications: Allergies:  Patient has no known allergies.   Scheduled Meds:   warfarin  5 mg Oral Once    warfarin  7.5 mg Oral Once    carvedilol  25 mg Oral BID WC    isosorbide mononitrate  30 mg Oral Daily    sacubitril-valsartan  1 tablet Oral BID    spironolactone  25 mg Oral Daily    warfarin (COUMADIN) daily dosing (placeholder)   Other RX Placeholder    sodium chloride flush  10 mL IntraVENous 2 times per day    aspirin  81 mg Oral Daily    atorvastatin  80 mg Oral Daily    cyanocobalamin  1,000 mcg Oral Daily    folic acid  1 mg Oral Daily    gabapentin  100 mg Oral BID    levothyroxine  100 mcg Oral Daily    megestrol  20 mg Oral Daily    predniSONE  5 mg Oral Every Other Day    sevelamer  800 mg Oral TID WC    midodrine  5 mg Oral TID WC     Continuous Infusions:   sodium chloride           Objective:       Vitals:  BP (!) 114/57   Pulse 69   Temp 98.2 °F (36.8 °C) (Oral)   Resp 17   Ht 6' 2\" (1.88 m)   Wt 136 lb 14.5 oz (62.1 kg)   SpO2 100%   BMI 17.58 kg/m²   Constitutional:  awake, NAD  HEENT:  MMM, No icterus  Cardiovascular:  S1, S2 reg  Respiratory: CTA, no crackles  Abdomen:  +BS, soft, NT, ND  Ext: no lower extremity edema  Access: LUE AVG  CNS: alert, no agitation    Data:     Labs:  Hepatic:   No results for input(s): AST, ALT, ALB, BILITOT, ALKPHOS in the last 72 hours. BNP: No results for input(s): BNP in the last 72 hours. ABGs: No results for input(s): PHART, PO2ART, ZAQ2SGZ in the last 72 hours. IMAGING:  CT HEAD WO CONTRAST   Final Result   Ill-defined hypodensity is seen in the right occipital lobe, new compared to   2012, likely sequelae from remote infarct. Linear areas of increased density   are seen along the cortical surface, likely laminar necrosis. XR CHEST PORTABLE   Final Result   1. Cardiomegaly with probable pulmonary venous hypertension   2. Right pleural effusion and/or pleural thickening             Assessment :       1. ESRD   Etiology: IgAN  Access: AVG  Volume: Euvolemic    2. Electrolytes/Acid base  No Dyskalemia    Recent Labs     11/22/21  0605      K 4.1   CO2 29       3. BMD  -Hyperphosphatemia, SHPT    Lab Results   Component Value Date    .6 (H) 07/21/2016    CALCIUM 8.9 11/22/2021    PHOS 3.8 07/21/2016        4. HTN  -Blood pressure at goal     BP Readings from Last 1 Encounters:   11/22/21 (!) 114/57       5. Anemia  -anemia of CKD    Recent Labs     11/22/21  0605   HGB 7.3*     6. MV stenosis  -severe bioprosthetic valve stenosis    7. CAD/CHF  -TTE: EF 30% to 35%. PLAN :       - Maintenance HD MWF. UF as tolerated  - HD 11/21 per holiday schedule   - MBD management  - Anemia management  - Dose meds to GFR<10    Thank you for allowing us to participate in care of [facility] . We will continue to follow. Feel free to contact me with any questions.       Aldo Bowie MD  11/22/2021    [facility] [address]  Office : [phone]  Fax :[phone]

## 2021-11-22 NOTE — CARE COORDINATION
DISCHARGE SUMMARY     DATE OF DISCHARGE: 11/22/2021    DISCHARGE DESTINATION: Home with family    Grant Solis   [place], [address]  Phone:  [phone]  Fax Number: [phone]    [place] [address]  Phone: [phone]  Fax: [phone]    TRANSPORTATION: Family will transport    Company Name:    Relationship: Wife  Norris Eaton up Time: TBD by patient, family and/or medical staff. Phone Number: [phone]    COMMENTS: SW spoke with wife via telephone today and she was in agreement with discharge to home. SW left message for driving eval per wife's request as she would like him to come in. Their contact info is on his discharge paperwork in case they want to follow up on their own. No further needs noted unless indicated by patient, family and/or medical staff. Respectfully submitted,    BIJAN BlackwellS  [facility]   [phone]    Electronically signed by FIDEL Shrestha on 11/22/2021 at 3:40 PM

## 2021-11-22 NOTE — DISCHARGE INSTR - COC
Continuity of Care Form    Patient Name: Indira Mena   :  1965  MRN:  [id number]    Admit date:  2021  Discharge date:  2021    Code Status Order: Full Code   Advance Directives:      Admitting Physician:  Zeina Gomez MD  PCP: Leslye Vargas MD    Discharging Nurse: [facility] Unit/Room#: [id number]-[id number]/2453-71  Discharging Unit Phone Number: [phone]    Emergency Contact:   Extended Emergency Contact Information  Primary Emergency Contact: Ye Eaton  Address: [address] [place] [address], [address] [place] [address],[address] Phone: [phone]  Relation: Spouse  Secondary Emergency Contact: Kristina Storm  Home Phone: [phone]  Relation: Brother/Sister    Past Surgical History:  Past Surgical History:   Procedure Laterality Date    ANUS SURGERY      sphincterotomy - Dr. Bret Fuentes  2010    Dr. Pb Mock  2013    Dr. Shaheed Mock  2016    Linda Ocampo MD- [facility]    COLONOSCOPY N/A 3/2/2020    COLONOSCOPY POLYPECTOMY SNARE/COLD BIOPSY performed by Clyde Vincent MD at [address] [facility]  20    x`2 stents     CYSTOSCOPY  2014    Dr. Sin Green      left arm    DIALYSIS FISTULA CREATION  2014    Dr. Shu Ortez      right arm - plate inserted    [address] [facility]    mother gave him kidney    KIDNEY TRANSPLANT  2001    MITRAL VALVE REPAIR      MOUTH SURGERY  2008    THYROIDECTOMY  2009    total thyroidectomy done re: papillary carcinoma - Dr. Garo GREENBERG  2014 Dr. Fabiola Suarez  March 16, 2012    Dr. Rocio Ba  October 17, 2013    Dr. Rick Rodas  March 19, 2014    Dr. Yady Curiel      stents bilat legs       Immunization History:   Immunization History   Administered Date(s) Administered    Influenza 12/23/2010, 10/01/2013    Influenza Virus Vaccine 10/31/2015    Tdap (Boostrix, Adacel) 03/11/2009       Active Problems:  Patient Active Problem List   Diagnosis Code    GERD (gastroesophageal reflux disease) K21.9    Anger R45.4    Depression F32. A    Thyroid cancer ([facility] [address] C73    S/P kidney transplant Z94.0    Memory loss R41.3    Irritability R45. 4    Abnormal weight loss R63.4    Erectile dysfunction N52.9    Decreased libido R68.82    Anemia N44.2    Folic acid deficiency A84.3    Need for SBE (subacute bacterial endocarditis) prophylaxis Z29.8    Memory loss R41. 3    Chest pain R07.9    Acute kidney injury ([facility] [address] N17.9    Abdominal pain R10.9    Nausea R11.0    Diarrhea R19.7    Mesenteric ischemia (HCC) K55.9    Exertional angina ([facility]) I20.8    Nonerosive nonspecific gastritis K29.60    Gastroparesis K31.84    Paraesophageal hiatal hernia K44.9    Lower urinary tract symptoms R39.9    ESRD (end stage renal disease) on dialysis ([facility]) N18.6, Z99.2    Hand injury S69.90XA    Toe injury S99.929A    Fracture of metacarpal neck of right hand, closed S62.339A    Bilateral low back pain with sciatica M54.40    Insomnia G47.00    Sty H00.019    Essential hypertension I10    ESRD (end stage renal disease) ([facility]) N18.6       Isolation/Infection:   Isolation            No Isolation          Patient Infection Status       None to display            Nurse Assessment:  Last Vital Signs: /61   Pulse 74   Temp 98 °F (36.7 °C) (Oral)   Resp 16   Ht 6' 2\" (1.88 m)   Wt 136 lb 14.5 oz (62.1 kg)   SpO2 100%   BMI 17.58 kg/m²     Last documented pain score (0-10 scale): Pain Level: 0  Last Weight:   Wt Readings from Last 1 Encounters:   11/22/21 136 lb 14.5 oz (62.1 kg)     Mental Status:  oriented and alert    IV Access:  - Dialysis Fistula Left arm    Nursing Mobility/ADLs:  Walking   Independent  Transfer  Independent  Bathing  Independent  Dressing  Independent  Toileting  Independent  Feeding  Independent  Med Admin  Independent  Med Delivery   whole    Wound Care Documentation and Therapy:        Elimination:  Continence: Bowel: Yes  Bladder: Yes  Urinary Catheter: None   Colostomy/Ileostomy/Ileal Conduit: No       Date of Last BM: 11/20/2021    Intake/Output Summary (Last 24 hours) at 11/22/2021 1532  Last data filed at 11/22/2021 0919  Gross per 24 hour   Intake 1210 ml   Output 2500 ml   Net -1290 ml     I/O last 3 completed shifts: In: 1210 [P.O.:710]  Out: 2500     Safety Concerns:     None    Impairments/Disabilities:      None    Nutrition Therapy:  Current Nutrition Therapy:   - Oral Diet:  Renal    Routes of Feeding: Oral  Liquids: No Restrictions  Daily Fluid Restriction: no  Last Modified Barium Swallow with Video (Video Swallowing Test): not done    Treatments at the Time of Hospital Discharge:   Respiratory Treatments:   Oxygen Therapy:  is not on home oxygen therapy.   Ventilator:    - No ventilator support    Rehab Therapies: Physical Therapy  Weight Bearing Status/Restrictions: No weight bearing restirctions  Other Medical Equipment (for information only, NOT a DME order):  cane  Other Treatments:     Patient's personal belongings (please select all that are sent with patient):  None    RN SIGNATURE:  Electronically signed by Samra Ponce RN on 11/22/21 at 3:44 PM EST    CASE MANAGEMENT/SOCIAL WORK SECTION    Inpatient Status Date:      Readmission Risk Assessment Score:  Readmission Risk              Risk of Unplanned Readmission:  [address]

## 2021-11-22 NOTE — PROGRESS NOTES
Clinical Pharmacy Note  Warfarin Consult    Barbara Grimaldo is a 64 y.o. male receiving warfarin managed by pharmacy. Patient being bridged with heparin. Warfarin Indication: mitral valve replacement  Target INR range: 2-3   Dose prior to admission: 5 mg daily    Current warfarin drug-drug interactions:      Recent Labs     11/20/21  0605 11/20/21  0925 11/21/21  0530 11/21/21  0930 11/22/21  0605   HGB 8.5*  --  8.0*  --  7.3*   HCT 25.7*  --  24.7*  --  22.7*   INR 1.91*   < > 1.85* 1.96* 2.40*    < > = values in this interval not displayed. Assessment/Plan:    INR is now in range. Will contact MD to see if Heparin drip can be stopped. Warfarin 5 mg tonight. Daily PT/INR until stable within therapeutic range. Thank you for the consult. Will continue to follow.   Guille Mccann, [facility]

## 2021-11-22 NOTE — PROGRESS NOTES
Clinical Pharmacy Note  Heparin Dosing       Lab Results   Component Value Date    APTT 40.0 11/22/2021     Lab Results   Component Value Date    HGB 8.0 11/21/2021    HCT 24.7 11/21/2021     11/21/2021    INR 1.96 11/21/2021       Current Infusion Rate: 1260 units/hr    Plan:  Bolus: 1900 units  Rate: increase to 1380 units/hr  Next aPTT: 0900 11/22/21    Pharmacy will continue to monitor and adjust based on aPTT results.   Mariah RodneyD

## 2021-11-22 NOTE — DISCHARGE SUMMARY
Hospital Medicine   Discharge Summary    Patient: Ned Garcia   MRN: [id number]  : 1965     Admit Date: 2021   Discharge Date:   2021  Disposition:  Home or Self Care   Condition at Discharge:  Stable      Admitting Physician: Lu Kehr, MD  Discharge Physician: Sherryle Blanco, MD     Discharge Diagnosis:     Active Hospital Problems    Diagnosis     ESRD (end stage renal disease) [facility]) [N18.6]          HOSPITAL COURSE:     Ned Garcia is a 64 y.o. male with a PMHx of CAD, ESRD who presented to hospital at the request of PCP for low BP. Patient seems to be drowsy during the interview, reportedly patient missed his dialysis today. No reported fevers chills chest pain nausea vomiting diarrhea constipation dysuria. The following issues were addressed during this admission:      1.  Acute metabolic encephalopathy, likely from uremia, nephrology consulted CT head with hypodensity in right occipital lobe new compared to 2012, considered likely to be sequelae of remote infarct w/ possible embolic origin. Cardiology recommendation to start on warfarin with heparin bridge. This AM, INR 2.4. Will continue on wafarin 5mg daily. He is to receive INR checks at dialysis. 2.  End-stage renal disease, nephrology consulted dialysis  3.  Elevated troponin and BNP, likely related to end-stage renal disease. 4.  Anemia, appears of chronic disease, no signs of bleeding  5.  Right pleural effusion, likely overall due to fluid overload.  Dialysis  6.  Severe mitral stenosis, s/p bioprosthetic valve, workup completed at [facility]. Already has plans for mechanical mitral valve placement. Seen by cardiology, ok to return to [facility] for pre-admission plans.          Medications:     Current Discharge Medication List      START taking these medications    Details   midodrine (PROAMATINE) 5 MG tablet Take 1 tablet by mouth 3 times daily (with meals)  Qty: 90 tablet, Refills: 1 Current Discharge Medication List      CONTINUE these medications which have NOT CHANGED    Details   cyanocobalamin 1000 MCG tablet Take 1,000 mcg by mouth daily      warfarin (COUMADIN) 5 MG tablet Take 5 mg by mouth daily      sacubitril-valsartan (ENTRESTO) 24-26 MG per tablet Take 1 tablet by mouth 2 times daily      folic acid (FOLVITE) 1 MG tablet Take 1 mg by mouth daily      gabapentin (NEURONTIN) 100 MG capsule Take 100 mg by mouth 2 times daily. predniSONE (DELTASONE) 5 MG tablet Take 1 tablet by mouth every other day TAKE 1 TABLET BY MOUTH EVERY DAY WITH FOOD  Qty: 30 tablet, Refills: 3    Associated Diagnoses: S/P kidney transplant      spironolactone (ALDACTONE) 25 MG tablet Take 25 mg by mouth daily      aspirin 81 MG tablet Take 81 mg by mouth daily      isosorbide mononitrate (IMDUR) 30 MG extended release tablet Take 30 mg by mouth daily      atorvastatin (LIPITOR) 80 MG tablet Take 80 mg by mouth daily      megestrol (MEGACE) 20 MG tablet Take 20 mg by mouth daily      sevelamer (RENVELA) 800 MG tablet TAKE 1 TABLET BY MOUTH WITH MEALS THREE TIMES DAILY  Qty: 810 tablet, Refills: 1    Comments: **Patient requests 90 days supply**      levothyroxine (SYNTHROID) 100 MCG tablet Take 100 mcg by mouth Daily     Associated Diagnoses: Thyroid cancer (HCC)      Cholecalciferol (VITAMIN D) 1000 UNIT TABS Take 1 tablet by mouth daily.       lidocaine-prilocaine (EMLA) 2.5-2.5 % cream as needed      nitroGLYCERIN (NITROSTAT) 0.4 MG SL tablet as needed  Refills: 0           Current Discharge Medication List      STOP taking these medications       carvedilol (COREG) 25 MG tablet Comments:   Reason for Stopping:         erythromycin (ROMYCIN) 5 MG/GM ophthalmic ointment Comments:   Reason for Stopping:         hydrALAZINE (APRESOLINE) 50 MG tablet Comments:   Reason for Stopping:         vitamin D 1000 units CAPS Comments:   Reason for Stopping:                 PHYSICAL EXAM:   /61   Pulse 74 Temp 98 °F (36.7 °C) (Oral)   Resp 16   Ht 6' 2\" (1.88 m)   Wt 136 lb 14.5 oz (62.1 kg)   SpO2 100%   BMI 17.58 kg/m²     Physical Exam  Vitals reviewed. Constitutional:       General: He is not in acute distress. Appearance: Normal appearance. He is well-developed. He is not diaphoretic. HENT:      Head: Normocephalic and atraumatic. Mouth/Throat:      Mouth: Mucous membranes are moist.   Eyes:      Extraocular Movements: Extraocular movements intact. Pupils: Pupils are equal, round, and reactive to light. Cardiovascular:      Rate and Rhythm: Normal rate and regular rhythm. Pulses: Normal pulses. Heart sounds: Normal heart sounds. No murmur heard. No friction rub. No gallop. Pulmonary:      Effort: Pulmonary effort is normal. No respiratory distress. Breath sounds: Normal breath sounds. No wheezing or rales. Chest:      Chest wall: No tenderness. Abdominal:      General: Bowel sounds are normal. There is no distension. Palpations: Abdomen is soft. There is no mass. Tenderness: There is no abdominal tenderness. There is no guarding. Musculoskeletal:         General: No tenderness or deformity. Cervical back: Normal range of motion and neck supple. Right lower leg: No edema. Left lower leg: No edema. Skin:     General: Skin is warm and dry. Capillary Refill: Capillary refill takes less than 2 seconds. Neurological:      General: No focal deficit present. Mental Status: He is alert and oriented to person, place, and time.    Psychiatric:         Behavior: Behavior normal.         Consults / Procedures:     IP CONSULT TO NEPHROLOGY  IP CONSULT TO HOSPITALIST  IP CONSULT TO CARDIOLOGY  IP CONSULT TO NEUROLOGY  IP CONSULT TO PHARMACY      Discharge Instructions:     - stop Coreg   - continue Midodrine 5mg three times daily for low BP  - continue warfarin 5mg daily, will need INR checks weekly at dialysis  - continue dialysis   - pt and wife understand and agree to above plan  - will need follow up with nephrology, cardiology (at [facility])        Padmini Conde MD   Hospitalist      11/22/2021      Patient was seen and examined on day of discharge. Discharge summary is in conjunction with any daily progress note from day of discharge. Time spent on discharge is more than 60 minutes in the examination, evaluation, counseling, and review of medications and discharge plan.

## 2021-11-23 LAB — HEPATITIS B CORE TOTAL ANTIBODY: NEGATIVE

## 2021-12-26 ENCOUNTER — HOSPITAL ENCOUNTER (OUTPATIENT)
Age: 56
Setting detail: OBSERVATION
Discharge: HOME | End: 2021-12-27
Attending: STUDENT IN AN ORGANIZED HEALTH CARE EDUCATION/TRAINING PROGRAM | Admitting: INTERNAL MEDICINE
Payer: MEDICARE

## 2021-12-26 ENCOUNTER — APPOINTMENT (OUTPATIENT)
Dept: GENERAL RADIOLOGY | Age: 56
End: 2021-12-26
Payer: MEDICARE

## 2021-12-26 DIAGNOSIS — R19.7 DIARRHEA, UNSPECIFIED TYPE: ICD-10-CM

## 2021-12-26 DIAGNOSIS — N18.6 ESRD NEEDING DIALYSIS (HCC): Primary | ICD-10-CM

## 2021-12-26 DIAGNOSIS — E87.5 HYPERKALEMIA: ICD-10-CM

## 2021-12-26 DIAGNOSIS — Z99.2 ESRD NEEDING DIALYSIS (HCC): Primary | ICD-10-CM

## 2021-12-26 LAB
A/G RATIO: 0.9 (ref 1.1–2.2)
ALBUMIN SERPL-MCNC: 4 G/DL (ref 3.4–5)
ALP BLD-CCNC: 203 U/L (ref 40–129)
ALT SERPL-CCNC: 104 U/L (ref 10–40)
ANION GAP SERPL CALCULATED.3IONS-SCNC: 28 MMOL/L (ref 3–16)
AST SERPL-CCNC: 141 U/L (ref 15–37)
BASOPHILS ABSOLUTE: 0.1 K/UL (ref 0–0.2)
BASOPHILS RELATIVE PERCENT: 1.1 %
BILIRUB SERPL-MCNC: 0.8 MG/DL (ref 0–1)
BUN BLDV-MCNC: 86 MG/DL (ref 7–20)
CALCIUM SERPL-MCNC: 9.9 MG/DL (ref 8.3–10.6)
CHLORIDE BLD-SCNC: 93 MMOL/L (ref 99–110)
CO2: 14 MMOL/L (ref 21–32)
CREAT SERPL-MCNC: 9.6 MG/DL (ref 0.9–1.3)
EOSINOPHILS ABSOLUTE: 0.1 K/UL (ref 0–0.6)
EOSINOPHILS RELATIVE PERCENT: 1.2 %
GFR AFRICAN AMERICAN: 7
GFR NON-AFRICAN AMERICAN: 6
GLUCOSE BLD-MCNC: 100 MG/DL (ref 70–99)
GLUCOSE BLD-MCNC: 52 MG/DL (ref 70–99)
GLUCOSE BLD-MCNC: 63 MG/DL (ref 70–99)
HCT VFR BLD CALC: 39.1 % (ref 40.5–52.5)
HEMATOLOGY PATH CONSULT: NO
HEMOGLOBIN: 12.1 G/DL (ref 13.5–17.5)
LYMPHOCYTES ABSOLUTE: 2.2 K/UL (ref 1–5.1)
LYMPHOCYTES RELATIVE PERCENT: 29.1 %
MCH RBC QN AUTO: 36.8 PG (ref 26–34)
MCHC RBC AUTO-ENTMCNC: 31.1 G/DL (ref 31–36)
MCV RBC AUTO: 118.3 FL (ref 80–100)
MONOCYTES ABSOLUTE: 0.7 K/UL (ref 0–1.3)
MONOCYTES RELATIVE PERCENT: 9.7 %
NEUTROPHILS ABSOLUTE: 4.4 K/UL (ref 1.7–7.7)
NEUTROPHILS RELATIVE PERCENT: 58.9 %
PDW BLD-RTO: 19.3 % (ref 12.4–15.4)
PERFORMED ON: ABNORMAL
PERFORMED ON: ABNORMAL
PLATELET # BLD: 217 K/UL (ref 135–450)
PMV BLD AUTO: 8.4 FL (ref 5–10.5)
POTASSIUM REFLEX MAGNESIUM: 6.5 MMOL/L (ref 3.5–5.1)
PRO-BNP: ABNORMAL PG/ML (ref 0–124)
RBC # BLD: 3.3 M/UL (ref 4.2–5.9)
SARS-COV-2, NAAT: NOT DETECTED
SODIUM BLD-SCNC: 135 MMOL/L (ref 136–145)
TOTAL PROTEIN: 8.3 G/DL (ref 6.4–8.2)
TROPONIN: 0.13 NG/ML
WBC # BLD: 7.4 K/UL (ref 4–11)

## 2021-12-26 PROCEDURE — G0378 HOSPITAL OBSERVATION PER HR: HCPCS

## 2021-12-26 PROCEDURE — 2580000003 HC RX 258: Performed by: INTERNAL MEDICINE

## 2021-12-26 PROCEDURE — 96375 TX/PRO/DX INJ NEW DRUG ADDON: CPT

## 2021-12-26 PROCEDURE — 93005 ELECTROCARDIOGRAM TRACING: CPT | Performed by: STUDENT IN AN ORGANIZED HEALTH CARE EDUCATION/TRAINING PROGRAM

## 2021-12-26 PROCEDURE — 96374 THER/PROPH/DIAG INJ IV PUSH: CPT

## 2021-12-26 PROCEDURE — 2500000003 HC RX 250 WO HCPCS: Performed by: STUDENT IN AN ORGANIZED HEALTH CARE EDUCATION/TRAINING PROGRAM

## 2021-12-26 PROCEDURE — 2500000003 HC RX 250 WO HCPCS: Performed by: HOSPITALIST

## 2021-12-26 PROCEDURE — 84484 ASSAY OF TROPONIN QUANT: CPT

## 2021-12-26 PROCEDURE — 71045 X-RAY EXAM CHEST 1 VIEW: CPT

## 2021-12-26 PROCEDURE — 6370000000 HC RX 637 (ALT 250 FOR IP): Performed by: INTERNAL MEDICINE

## 2021-12-26 PROCEDURE — 85025 COMPLETE CBC W/AUTO DIFF WBC: CPT

## 2021-12-26 PROCEDURE — 99223 1ST HOSP IP/OBS HIGH 75: CPT | Performed by: HOSPITALIST

## 2021-12-26 PROCEDURE — 6360000002 HC RX W HCPCS: Performed by: STUDENT IN AN ORGANIZED HEALTH CARE EDUCATION/TRAINING PROGRAM

## 2021-12-26 PROCEDURE — 36415 COLL VENOUS BLD VENIPUNCTURE: CPT

## 2021-12-26 PROCEDURE — 99285 EMERGENCY DEPT VISIT HI MDM: CPT

## 2021-12-26 PROCEDURE — 80053 COMPREHEN METABOLIC PANEL: CPT

## 2021-12-26 PROCEDURE — 90935 HEMODIALYSIS ONE EVALUATION: CPT | Performed by: HOSPITALIST

## 2021-12-26 PROCEDURE — 90935 HEMODIALYSIS ONE EVALUATION: CPT

## 2021-12-26 PROCEDURE — 83880 ASSAY OF NATRIURETIC PEPTIDE: CPT

## 2021-12-26 PROCEDURE — 87635 SARS-COV-2 COVID-19 AMP PRB: CPT

## 2021-12-26 RX ORDER — CALCIUM GLUCONATE 94 MG/ML
1000 INJECTION, SOLUTION INTRAVENOUS ONCE
Status: COMPLETED | OUTPATIENT
Start: 2021-12-26 | End: 2021-12-26

## 2021-12-26 RX ORDER — SODIUM CHLORIDE 0.9 % (FLUSH) 0.9 %
10 SYRINGE (ML) INJECTION EVERY 12 HOURS SCHEDULED
Status: DISCONTINUED | OUTPATIENT
Start: 2021-12-26 | End: 2021-12-27 | Stop reason: HOSPADM

## 2021-12-26 RX ORDER — DEXTROSE MONOHYDRATE 25 G/50ML
25 INJECTION, SOLUTION INTRAVENOUS ONCE
Status: COMPLETED | OUTPATIENT
Start: 2021-12-26 | End: 2021-12-26

## 2021-12-26 RX ORDER — LIDOCAINE HYDROCHLORIDE 10 MG/ML
5 INJECTION, SOLUTION INFILTRATION; PERINEURAL ONCE
Status: COMPLETED | OUTPATIENT
Start: 2021-12-26 | End: 2021-12-26

## 2021-12-26 RX ORDER — POLYETHYLENE GLYCOL 3350 17 G/17G
17 POWDER, FOR SOLUTION ORAL DAILY PRN
Status: DISCONTINUED | OUTPATIENT
Start: 2021-12-26 | End: 2021-12-27 | Stop reason: HOSPADM

## 2021-12-26 RX ORDER — VITAMIN B COMPLEX
1000 TABLET ORAL DAILY
Status: DISCONTINUED | OUTPATIENT
Start: 2021-12-26 | End: 2021-12-27 | Stop reason: HOSPADM

## 2021-12-26 RX ORDER — ISOSORBIDE MONONITRATE 30 MG/1
30 TABLET, EXTENDED RELEASE ORAL DAILY
Status: DISCONTINUED | OUTPATIENT
Start: 2021-12-27 | End: 2021-12-27 | Stop reason: HOSPADM

## 2021-12-26 RX ORDER — DEXTROSE MONOHYDRATE 25 G/50ML
12.5 INJECTION, SOLUTION INTRAVENOUS PRN
Status: DISCONTINUED | OUTPATIENT
Start: 2021-12-26 | End: 2021-12-27 | Stop reason: HOSPADM

## 2021-12-26 RX ORDER — SPIRONOLACTONE 25 MG/1
25 TABLET ORAL DAILY
Status: DISCONTINUED | OUTPATIENT
Start: 2021-12-27 | End: 2021-12-27 | Stop reason: HOSPADM

## 2021-12-26 RX ORDER — ASPIRIN 81 MG/1
81 TABLET, CHEWABLE ORAL DAILY
Status: DISCONTINUED | OUTPATIENT
Start: 2021-12-26 | End: 2021-12-27 | Stop reason: HOSPADM

## 2021-12-26 RX ORDER — DEXTROSE MONOHYDRATE 50 MG/ML
100 INJECTION, SOLUTION INTRAVENOUS PRN
Status: DISCONTINUED | OUTPATIENT
Start: 2021-12-26 | End: 2021-12-27 | Stop reason: HOSPADM

## 2021-12-26 RX ORDER — FOLIC ACID 1 MG/1
1 TABLET ORAL DAILY
Status: DISCONTINUED | OUTPATIENT
Start: 2021-12-27 | End: 2021-12-27 | Stop reason: HOSPADM

## 2021-12-26 RX ORDER — ONDANSETRON 2 MG/ML
4 INJECTION INTRAMUSCULAR; INTRAVENOUS EVERY 6 HOURS PRN
Status: DISCONTINUED | OUTPATIENT
Start: 2021-12-26 | End: 2021-12-26 | Stop reason: SDUPTHER

## 2021-12-26 RX ORDER — SODIUM CHLORIDE 0.9 % (FLUSH) 0.9 %
10 SYRINGE (ML) INJECTION PRN
Status: DISCONTINUED | OUTPATIENT
Start: 2021-12-26 | End: 2021-12-27 | Stop reason: HOSPADM

## 2021-12-26 RX ORDER — ONDANSETRON 2 MG/ML
4 INJECTION INTRAMUSCULAR; INTRAVENOUS EVERY 6 HOURS PRN
Status: DISCONTINUED | OUTPATIENT
Start: 2021-12-26 | End: 2021-12-27 | Stop reason: HOSPADM

## 2021-12-26 RX ORDER — ATORVASTATIN CALCIUM 40 MG/1
80 TABLET, FILM COATED ORAL DAILY
Status: DISCONTINUED | OUTPATIENT
Start: 2021-12-27 | End: 2021-12-26

## 2021-12-26 RX ORDER — ONDANSETRON 4 MG/1
4 TABLET, ORALLY DISINTEGRATING ORAL EVERY 8 HOURS PRN
Status: DISCONTINUED | OUTPATIENT
Start: 2021-12-26 | End: 2021-12-27 | Stop reason: HOSPADM

## 2021-12-26 RX ORDER — CHOLECALCIFEROL (VITAMIN D3) 125 MCG
1000 CAPSULE ORAL DAILY
Status: DISCONTINUED | OUTPATIENT
Start: 2021-12-27 | End: 2021-12-27 | Stop reason: HOSPADM

## 2021-12-26 RX ORDER — LEVOTHYROXINE SODIUM 0.1 MG/1
100 TABLET ORAL DAILY
Status: DISCONTINUED | OUTPATIENT
Start: 2021-12-27 | End: 2021-12-27 | Stop reason: HOSPADM

## 2021-12-26 RX ORDER — MIDODRINE HYDROCHLORIDE 5 MG/1
5 TABLET ORAL
Status: DISCONTINUED | OUTPATIENT
Start: 2021-12-27 | End: 2021-12-27 | Stop reason: HOSPADM

## 2021-12-26 RX ORDER — GABAPENTIN 100 MG/1
100 CAPSULE ORAL 2 TIMES DAILY
Status: DISCONTINUED | OUTPATIENT
Start: 2021-12-26 | End: 2021-12-27 | Stop reason: HOSPADM

## 2021-12-26 RX ORDER — DEXTROSE MONOHYDRATE 25 G/50ML
25 INJECTION, SOLUTION INTRAVENOUS ONCE
Status: DISCONTINUED | OUTPATIENT
Start: 2021-12-26 | End: 2021-12-27 | Stop reason: HOSPADM

## 2021-12-26 RX ORDER — SEVELAMER CARBONATE 800 MG/1
800 TABLET, FILM COATED ORAL
Status: DISCONTINUED | OUTPATIENT
Start: 2021-12-27 | End: 2021-12-27 | Stop reason: HOSPADM

## 2021-12-26 RX ORDER — PROMETHAZINE HYDROCHLORIDE 25 MG/1
12.5 TABLET ORAL EVERY 6 HOURS PRN
Status: DISCONTINUED | OUTPATIENT
Start: 2021-12-26 | End: 2021-12-27 | Stop reason: HOSPADM

## 2021-12-26 RX ORDER — HEPARIN SODIUM 5000 [USP'U]/ML
5000 INJECTION, SOLUTION INTRAVENOUS; SUBCUTANEOUS EVERY 8 HOURS SCHEDULED
Status: CANCELLED | OUTPATIENT
Start: 2021-12-26

## 2021-12-26 RX ORDER — HEPARIN SODIUM 5000 [USP'U]/ML
5000 INJECTION, SOLUTION INTRAVENOUS; SUBCUTANEOUS 2 TIMES DAILY
Status: CANCELLED | OUTPATIENT
Start: 2021-12-26

## 2021-12-26 RX ORDER — GABAPENTIN 100 MG/1
100 CAPSULE ORAL ONCE
Status: COMPLETED | OUTPATIENT
Start: 2021-12-27 | End: 2021-12-27

## 2021-12-26 RX ORDER — SODIUM CHLORIDE 9 MG/ML
25 INJECTION, SOLUTION INTRAVENOUS PRN
Status: DISCONTINUED | OUTPATIENT
Start: 2021-12-26 | End: 2021-12-27 | Stop reason: HOSPADM

## 2021-12-26 RX ORDER — PREDNISONE 1 MG/1
5 TABLET ORAL EVERY OTHER DAY
Status: DISCONTINUED | OUTPATIENT
Start: 2021-12-27 | End: 2021-12-27 | Stop reason: HOSPADM

## 2021-12-26 RX ORDER — ATORVASTATIN CALCIUM 80 MG/1
80 TABLET, FILM COATED ORAL NIGHTLY
Status: DISCONTINUED | OUTPATIENT
Start: 2021-12-26 | End: 2021-12-27 | Stop reason: HOSPADM

## 2021-12-26 RX ORDER — NICOTINE POLACRILEX 4 MG
15 LOZENGE BUCCAL PRN
Status: DISCONTINUED | OUTPATIENT
Start: 2021-12-26 | End: 2021-12-27 | Stop reason: HOSPADM

## 2021-12-26 RX ADMIN — CALCIUM GLUCONATE 1000 MG: 98 INJECTION, SOLUTION INTRAVENOUS at 13:50

## 2021-12-26 RX ADMIN — Medication 1000 UNITS: at 23:15

## 2021-12-26 RX ADMIN — ASPIRIN 81 MG 81 MG: 81 TABLET ORAL at 23:15

## 2021-12-26 RX ADMIN — LIDOCAINE HYDROCHLORIDE 5 ML: 10 INJECTION, SOLUTION INFILTRATION; PERINEURAL at 16:25

## 2021-12-26 RX ADMIN — SODIUM CHLORIDE, PRESERVATIVE FREE 10 ML: 5 INJECTION INTRAVENOUS at 23:16

## 2021-12-26 RX ADMIN — GABAPENTIN 100 MG: 100 CAPSULE ORAL at 23:15

## 2021-12-26 RX ADMIN — ATORVASTATIN CALCIUM 80 MG: 80 TABLET, FILM COATED ORAL at 23:15

## 2021-12-26 RX ADMIN — DEXTROSE MONOHYDRATE 25 G: 25 INJECTION, SOLUTION INTRAVENOUS at 12:44

## 2021-12-26 ASSESSMENT — PAIN DESCRIPTION - LOCATION
LOCATION: FOOT
LOCATION: FOOT

## 2021-12-26 ASSESSMENT — PAIN DESCRIPTION - PROGRESSION
CLINICAL_PROGRESSION: NOT CHANGED

## 2021-12-26 ASSESSMENT — PAIN DESCRIPTION - FREQUENCY
FREQUENCY: CONTINUOUS
FREQUENCY: CONTINUOUS

## 2021-12-26 ASSESSMENT — PAIN - FUNCTIONAL ASSESSMENT
PAIN_FUNCTIONAL_ASSESSMENT: PREVENTS OR INTERFERES SOME ACTIVE ACTIVITIES AND ADLS
PAIN_FUNCTIONAL_ASSESSMENT: PREVENTS OR INTERFERES SOME ACTIVE ACTIVITIES AND ADLS

## 2021-12-26 ASSESSMENT — PAIN SCALES - GENERAL
PAINLEVEL_OUTOF10: 10
PAINLEVEL_OUTOF10: 10
PAINLEVEL_OUTOF10: 0

## 2021-12-26 ASSESSMENT — PAIN DESCRIPTION - DESCRIPTORS
DESCRIPTORS: BURNING
DESCRIPTORS: BURNING;THROBBING

## 2021-12-26 ASSESSMENT — PAIN DESCRIPTION - ORIENTATION
ORIENTATION: RIGHT
ORIENTATION: RIGHT

## 2021-12-26 ASSESSMENT — PAIN DESCRIPTION - ONSET
ONSET: ON-GOING
ONSET: ON-GOING

## 2021-12-26 ASSESSMENT — PAIN DESCRIPTION - PAIN TYPE
TYPE: CHRONIC PAIN
TYPE: CHRONIC PAIN

## 2021-12-26 NOTE — Clinical Note
Patient Class: Inpatient [101]   REQUIRED: Diagnosis: Hyperkalemia [088859]   Estimated Length of Stay: Estimated stay of more than 2 midnights   Future Attending Provider: Navid Mendoza [[id number]]
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
## 2021-12-26 NOTE — ED NOTES
Bed: A-16  Expected date:   Expected time:   Means of arrival: [facility]  Comments:  norbert Clemons RN  12/26/21 1157

## 2021-12-26 NOTE — ED NOTES
Pt refused additional blood work, refused fem stick, md aware.   phlebot was also unsuccessful at additional blood      Caprice Song RN  12/26/21 8656

## 2021-12-26 NOTE — ED NOTES
Phlebot bedside. The patient refused femoral stick due to a blood clot from a previous central line. EDMD informed.      Ale Del Real RN  12/26/21 0538

## 2021-12-26 NOTE — ED NOTES
Pt arrived via squad from home where he was on his way to HDU d/t missing his HDU yesterday d/t it being [place]. Pt became SOB with walking down the steps. Pt was admitted to [facility] last week for dehydration. It was also reported per family that pt was weak and fatigued. This is what they noticed last week during admission at the [facility]. PT was brought in via EMS with an EKG reading ST elevation. ED MD at bedside upon pt's arrival into ER. He was also given glucagon per ems d/t a CBG of 61.   Rechecked upon arrival into ER      Anam Rodriguez RN  12/26/21 5067

## 2021-12-26 NOTE — ED NOTES
Requested lab for assistance with blue top.   Unsuccessful blood amount for test.      Bharath Quezada RN  12/26/21 0171

## 2021-12-27 VITALS
SYSTOLIC BLOOD PRESSURE: 110 MMHG | WEIGHT: 144.84 LBS | HEART RATE: 96 BPM | BODY MASS INDEX: 19.62 KG/M2 | RESPIRATION RATE: 18 BRPM | OXYGEN SATURATION: 100 % | DIASTOLIC BLOOD PRESSURE: 78 MMHG | TEMPERATURE: 98 F | HEIGHT: 72 IN

## 2021-12-27 LAB
A/G RATIO: 1.1 (ref 1.1–2.2)
ALBUMIN SERPL-MCNC: 3.4 G/DL (ref 3.4–5)
ALP BLD-CCNC: 131 U/L (ref 40–129)
ALT SERPL-CCNC: 135 U/L (ref 10–40)
ANION GAP SERPL CALCULATED.3IONS-SCNC: 20 MMOL/L (ref 3–16)
AST SERPL-CCNC: 149 U/L (ref 15–37)
BILIRUB SERPL-MCNC: 0.6 MG/DL (ref 0–1)
BUN BLDV-MCNC: 54 MG/DL (ref 7–20)
CALCIUM SERPL-MCNC: 9.4 MG/DL (ref 8.3–10.6)
CHLORIDE BLD-SCNC: 95 MMOL/L (ref 99–110)
CO2: 23 MMOL/L (ref 21–32)
CREAT SERPL-MCNC: 7.4 MG/DL (ref 0.9–1.3)
EKG ATRIAL RATE: 79 BPM
EKG DIAGNOSIS: NORMAL
EKG P AXIS: 50 DEGREES
EKG P-R INTERVAL: 184 MS
EKG Q-T INTERVAL: 482 MS
EKG QRS DURATION: 194 MS
EKG QTC CALCULATION (BAZETT): 552 MS
EKG R AXIS: 123 DEGREES
EKG T AXIS: 3 DEGREES
EKG VENTRICULAR RATE: 79 BPM
GFR AFRICAN AMERICAN: 9
GFR NON-AFRICAN AMERICAN: 8
GLUCOSE BLD-MCNC: 64 MG/DL (ref 70–99)
GLUCOSE BLD-MCNC: 74 MG/DL (ref 70–99)
HCT VFR BLD CALC: 28.9 % (ref 40.5–52.5)
HEMATOLOGY PATH CONSULT: NO
HEMOGLOBIN: 9.5 G/DL (ref 13.5–17.5)
INR BLD: 3.2 (ref 0.88–1.12)
INR BLD: 3.32 (ref 0.88–1.12)
MCH RBC QN AUTO: 36.9 PG (ref 26–34)
MCHC RBC AUTO-ENTMCNC: 33 G/DL (ref 31–36)
MCV RBC AUTO: 111.6 FL (ref 80–100)
PDW BLD-RTO: 17.3 % (ref 12.4–15.4)
PERFORMED ON: NORMAL
PLATELET # BLD: 175 K/UL (ref 135–450)
PMV BLD AUTO: 8.2 FL (ref 5–10.5)
POTASSIUM REFLEX MAGNESIUM: 3.8 MMOL/L (ref 3.5–5.1)
PROTHROMBIN TIME: 37.9 SEC (ref 9.9–12.7)
PROTHROMBIN TIME: 39.4 SEC (ref 9.9–12.7)
RBC # BLD: 2.59 M/UL (ref 4.2–5.9)
SODIUM BLD-SCNC: 138 MMOL/L (ref 136–145)
TOTAL PROTEIN: 6.6 G/DL (ref 6.4–8.2)
WBC # BLD: 4.9 K/UL (ref 4–11)

## 2021-12-27 PROCEDURE — 94761 N-INVAS EAR/PLS OXIMETRY MLT: CPT

## 2021-12-27 PROCEDURE — G0378 HOSPITAL OBSERVATION PER HR: HCPCS

## 2021-12-27 PROCEDURE — 2580000003 HC RX 258: Performed by: INTERNAL MEDICINE

## 2021-12-27 PROCEDURE — 90935 HEMODIALYSIS ONE EVALUATION: CPT | Performed by: HOSPITALIST

## 2021-12-27 PROCEDURE — 85027 COMPLETE CBC AUTOMATED: CPT

## 2021-12-27 PROCEDURE — 36415 COLL VENOUS BLD VENIPUNCTURE: CPT

## 2021-12-27 PROCEDURE — 80053 COMPREHEN METABOLIC PANEL: CPT

## 2021-12-27 PROCEDURE — 93010 ELECTROCARDIOGRAM REPORT: CPT | Performed by: INTERNAL MEDICINE

## 2021-12-27 PROCEDURE — 6370000000 HC RX 637 (ALT 250 FOR IP): Performed by: NURSE PRACTITIONER

## 2021-12-27 PROCEDURE — 90935 HEMODIALYSIS ONE EVALUATION: CPT

## 2021-12-27 PROCEDURE — 85610 PROTHROMBIN TIME: CPT

## 2021-12-27 PROCEDURE — 2700000000 HC OXYGEN THERAPY PER DAY

## 2021-12-27 PROCEDURE — 6370000000 HC RX 637 (ALT 250 FOR IP): Performed by: INTERNAL MEDICINE

## 2021-12-27 RX ADMIN — LEVOTHYROXINE SODIUM 100 MCG: 0.1 TABLET ORAL at 05:39

## 2021-12-27 RX ADMIN — GABAPENTIN 100 MG: 100 CAPSULE ORAL at 00:20

## 2021-12-27 RX ADMIN — SODIUM CHLORIDE, PRESERVATIVE FREE 10 ML: 5 INJECTION INTRAVENOUS at 09:22

## 2021-12-27 RX ADMIN — MIDODRINE HYDROCHLORIDE 5 MG: 5 TABLET ORAL at 08:15

## 2021-12-27 ASSESSMENT — PAIN DESCRIPTION - PROGRESSION
CLINICAL_PROGRESSION: NOT CHANGED

## 2021-12-27 ASSESSMENT — PAIN SCALES - GENERAL
PAINLEVEL_OUTOF10: 0
PAINLEVEL_OUTOF10: 0

## 2021-12-27 NOTE — PROGRESS NOTES
Pt called nurse to room, asking for warfarin. Stating at dialysis it was 1.7. orders placed lab collected and resulted. Informed patient that his INR was to high. Pt became argument susanne with nurse and insist that warfarin be admin. Pharmacy notified and warfarin was contraindicated with current INR levels. Wife notified of patient INR and his insistency on taking meds. Wife able to talk to patient and reassure him. Will cont to monitor and assess.

## 2021-12-27 NOTE — PROGRESS NOTES
This RN spoke with Dr. Shirlene Bynum via telephone about patients interest in going to [address] [place] [address], made MD aware patients wife was worried about blood pressure medications at this time and wanting a phone call in regards to plan of care.       Electronically signed by Conchita Rosado RN on 12/27/2021 at 1:58 PM

## 2021-12-27 NOTE — PROGRESS NOTES
Luis A Traore, wife, called this RN stating that she spoke to MD at [facility] and patient should be taking blood pressure medications. This RN informed wife that education will be provided to patient about importance of BP medications but patient has right to refuse.        Electronically signed by Adi Gallardo RN on 12/27/2021 at 1:20 PM

## 2021-12-27 NOTE — FLOWSHEET NOTE
12/26/21 1631 12/26/21 1931   Treatment   Time On 1631  --    Time Off  --  1931   Vital Signs   /71 134/82   Temp 96.5 °F (35.8 °C) 96.2 °F (35.7 °C)   Pulse 67 81   Resp 20 20   Weight 150 lb 5.7 oz (68.2 kg) 149 lb 4 oz (67.7 kg)   Post-Hemodialysis Assessment   NET Removed (ml)  --  500 ml     Treatment time: 3 hours  Net UF: 500ml    Pre weight: 68.2kg  Post weight: 67.7kg  EDW: N/A    Access used: left upper arm graft  Access function: good, reversed graft    Medications or blood products given: N/A    Regular outpatient schedule: MWF    Summary of response to treatment: pt tolerated treatment well, UF goal met, vital signs stable. Copy of dialysis treatment record placed in chart, to be scanned into EMR.     Electronically signed by So Garcia RN on 12/26/2021 at 7:45 PM

## 2021-12-27 NOTE — CARE COORDINATION
12/27 echo olivia/ Sharla olivia/ #[id number] - states transfer request to [facility] was initiated-- no beds at the present time - will follow.   Electronically signed by Rene Phoenix on 12/27/2021 at 3:04 PM  #[phone]

## 2021-12-27 NOTE — PROGRESS NOTES
4 Eyes Admission Assessment     I agree as the admission nurse that 2 RN's have performed a thorough Head to Toe Skin Assessment on the patient. ALL assessment sites listed below have been assessed on admission. Areas assessed by both nurses:   [x]   Head, Face, and Ears   [x]   Shoulders, Back, and Chest  [x]   Arms, Elbows, and Hands   [x]   Coccyx, Sacrum, and Ischum  [x]   Legs, Feet, and Heels        Does the Patient have Skin Breakdown?   No         Brayan Prevention initiated:  NA   Wound Care Orders initiated:  NA      WO nurse consulted for Pressure Injury (Stage 3,4, Unstageable, DTI, NWPT, and Complex wounds):  NA      Nurse 1 eSignature: Electronically signed by Aimee Malcolm RN on 12/26/21 at 11:26 PM EST    **SHARE this note so that the co-signing nurse is able to place an eSignature**    Nurse 2 eSignature: Electronically signed by Migdalia Gibson RN on 12/27/21 at 6:38 AM EST
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
## 2021-12-27 NOTE — PROGRESS NOTES
Orders placed to social work, pt requesting to be moved to [facility].
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
## 2021-12-27 NOTE — PLAN OF CARE
Problem: Fluid Volume:  Goal: Will show no signs or symptoms of fluid imbalance  Description: Will show no signs or symptoms of fluid imbalance  12/27/2021 0748 by Lino Hanson RN  Outcome: Ongoing  12/26/2021 2333 by Aimee Malcolm RN  Outcome: Ongoing  Note: Vitals signs are stable. Intake and output are being recorded, will continue to monitor      Problem: Health Behavior:  Goal: Ability to manage health-related needs will improve  Description: Ability to manage health-related needs will improve  12/27/2021 0748 by Lino Hanson RN  Outcome: Ongoing  12/26/2021 2333 by Aimee Malcolm RN  Outcome: Ongoing  Note: Will con tot monitor and assess  Goal: Identification of resources available to assist in meeting health care needs will improve  Description: Identification of resources available to assist in meeting health care needs will improve  12/27/2021 0748 by Lino Hanson RN  Outcome: Ongoing  12/26/2021 2333 by Aimee Malcolm RN  Outcome: Ongoing  Note: Will cont to monitor and assess     Problem: Pain:  Goal: Pain level will decrease  Description: Pain level will decrease  12/27/2021 0748 by Lino Hanson RN  Outcome: Ongoing  Note: Pt assessed for pain. Pt denies any pain at this time. Will continue to monitor pt and assess for pain throughout rest of shift. 12/26/2021 2333 by Aimee Malcolm RN  Outcome: Ongoing  Note: Pain /discomfort being managed with PRN analgesics per MD orders. Patient able to express presence and absence of pain and rate pain appropriately using numerical scale. Goal: Control of acute pain  Description: Control of acute pain  12/27/2021 0748 by Lino Hanson RN  Outcome: Ongoing  Note: Pt assessed for pain. Pt denies any pain at this time. Will continue to monitor pt and assess for pain throughout rest of shift.    12/26/2021 2333 by Aimee Malcolm RN  Outcome: Ongoing  Note: Pain /discomfort being managed with PRN analgesics per MD orders. Patient able to express presence and absence of pain and rate pain appropriately using numerical scale. Goal: Control of chronic pain  Description: Control of chronic pain  12/27/2021 0748 by Josie Shepherd RN  Outcome: Ongoing  Note: Pt assessed for pain. Pt denies any pain at this time. Will continue to monitor pt and assess for pain throughout rest of shift. 12/26/2021 2333 by Joleen Justin RN  Outcome: Ongoing  Note: Pain /discomfort being managed with PRN analgesics per MD orders. Patient able to express presence and absence of pain and rate pain appropriately using numerical scale. Problem: Falls - Risk of:  Goal: Will remain free from falls  Description: Will remain free from falls  12/27/2021 0748 by Josie Shepherd RN  Outcome: Ongoing  Note: Fall risk assessment completed. Fall precautions in place. Call light within reach. Pt educated on calling for assistance before getting up. Walkway free of clutter. Will continue to monitor. 12/26/2021 2333 by Joleen Justin RN  Outcome: Ongoing  Note: Fall risk assessment completed . Fall precautions in place, bed/ chair alarm on, side rails 2/4 up, call light in reach, educated pt on calling for assistance when needed, room clear of clutter. Pt verbalized understanding. Goal: Absence of physical injury  Description: Absence of physical injury  12/27/2021 0748 by Josie Shepherd RN  Outcome: Ongoing  Note: Pt is free of injury. No injury noted. Fall precautions in place. Call light within reach. Will monitor. 12/26/2021 2333 by Joleen Justin RN  Outcome: Ongoing  Note: No falls noted this shift. Patient ambulates with x1 staff assistance without difficulty. Family member at bedside, spent the night. Bed kept in low position. Safe environment maintained. Bedside table & call light in reach. Uses call light appropriately when needing assistance.         Problem: Skin Integrity:  Goal: Will show no infection signs and symptoms  Description: Will show no infection signs and symptoms  12/27/2021 0748 by Adriana Reilly RN  Outcome: Ongoing  Note: Patient remains free from new signs and symptoms of infection during this shift. Infection prevention measures are in place. Will continue to monitor for alterations in patient condition throughout the shift. 12/26/2021 2333 by Kristen Eaton RN  Outcome: Ongoing  Note: Pt assessed for infection, No signs or symptoms of surgical site noted. VVS, WBC WNL. Reviewed information with pt and family, pt verbalized understanding     Goal: Absence of new skin breakdown  Description: Absence of new skin breakdown  12/27/2021 0748 by Adriana Reilly RN  Outcome: Ongoing  Note: Patient skin condition and mucus membrane integrity remain unchanged during this shift. Skin breakdown prevention interventions are in place. Will continue to monitor and assess. 12/26/2021 2333 by Kristen Eaton RN  Outcome: Ongoing  Note: Early and frequent ambulqtion encouraged. Educated patient on importance of early ambulation. Patient assisted with ambulation. Will continue to monitor.

## 2021-12-27 NOTE — PLAN OF CARE
Problem: Fluid Volume:  Goal: Will show no signs or symptoms of fluid imbalance  Description: Will show no signs or symptoms of fluid imbalance  Outcome: Ongoing  Note: Vitals signs are stable. Intake and output are being recorded, will continue to monitor      Problem: Health Behavior:  Goal: Ability to manage health-related needs will improve  Description: Ability to manage health-related needs will improve  Outcome: Ongoing  Note: Will con tot monitor and assess  Goal: Identification of resources available to assist in meeting health care needs will improve  Description: Identification of resources available to assist in meeting health care needs will improve  Outcome: Ongoing  Note: Will cont to monitor and assess     Problem: Pain:  Goal: Pain level will decrease  Description: Pain level will decrease  Outcome: Ongoing  Note: Pain /discomfort being managed with PRN analgesics per MD orders. Patient able to express presence and absence of pain and rate pain appropriately using numerical scale. Goal: Control of acute pain  Description: Control of acute pain  Outcome: Ongoing  Note: Pain /discomfort being managed with PRN analgesics per MD orders. Patient able to express presence and absence of pain and rate pain appropriately using numerical scale. Goal: Control of chronic pain  Description: Control of chronic pain  Outcome: Ongoing  Note: Pain /discomfort being managed with PRN analgesics per MD orders. Patient able to express presence and absence of pain and rate pain appropriately using numerical scale. Problem: Falls - Risk of:  Goal: Will remain free from falls  Description: Will remain free from falls  Outcome: Ongoing  Note: Fall risk assessment completed . Fall precautions in place, bed/ chair alarm on, side rails 2/4 up, call light in reach, educated pt on calling for assistance when needed, room clear of clutter. Pt verbalized understanding.      Goal: Absence of physical injury  Description: Absence of physical injury  Outcome: Ongoing  Note: No falls noted this shift. Patient ambulates with x1 staff assistance without difficulty. Family member at bedside, spent the night. Bed kept in low position. Safe environment maintained. Bedside table & call light in reach. Uses call light appropriately when needing assistance. Problem: Skin Integrity:  Goal: Will show no infection signs and symptoms  Description: Will show no infection signs and symptoms  Outcome: Ongoing  Note: Pt assessed for infection, No signs or symptoms of surgical site noted. VVS, WBC WNL. Reviewed information with pt and family, pt verbalized understanding     Goal: Absence of new skin breakdown  Description: Absence of new skin breakdown  Outcome: Ongoing  Note: Early and frequent ambulqtion encouraged. Educated patient on importance of early ambulation. Patient assisted with ambulation. Will continue to monitor.

## 2021-12-27 NOTE — PROGRESS NOTES
Patient off floor to dialysis.      Electronically signed by Lino Hanson RN on 12/27/2021 at 9:20 AM

## 2021-12-27 NOTE — PROGRESS NOTES
Pt arrived to floor from ER at 2140 via stretcher. Pt oriented to room, call light, policies and procedures, the menu and ordering. Call light within reach. Bed in lowest position, bed alarm on, and wheels locked. Pt verbalized understanding. No complaints, questions or concerns at this time.

## 2021-12-27 NOTE — PROGRESS NOTES
Patients wife, Lawgregor Troncoso, requesting to speak to MD about medications and plan of care. Dr. Jamaal Torres notified via [employer].      Electronically signed by Terrance Garcia RN on 12/27/2021 at 12:09 PM

## 2021-12-27 NOTE — PROGRESS NOTES
Nurse in room to draw INR, pt refused, education provided that warfarin will not be given if INR is not drawn.

## 2021-12-27 NOTE — PROGRESS NOTES
Office: [phone]       Fax: [phone]      Nephrology Progress Note        Patient's Name: Marcela Andrew  Date of Visit: 12/27/2021    Reason for Consult:  ESRD management      History of Present Illness:      Marcela Andrew is a 64 y.o. male with PMHx of hypertension, IGAN, status post renal txp x2, ESRD who was admitted on 12/26/2021 with complaints of diarrhea  No abdominal pain   No fever  Was recently seen at [facility]. Missed dialysis on 12/24  Dialysis initiation: 2014  Etiology of ESRD: IgAN  Dialysis Location: [facility]  Schedule: [facility]  Primary Nephrologist: Dr. Yolanda Linder  EDW: 62 kg  Last Dialysis: 12/22  Access: AVG       INTERVAL HISTORY    Feels better  Shortness of breath: No   Diarrhea better    Medications:        Allergies:  Acetaminophen  Scheduled Meds:   insulin regular  10 Units IntraVENous Once    And    dextrose  25 g IntraVENous Once    sodium zirconium cyclosilicate  10 g Oral Once    sodium chloride flush  10 mL IntraVENous 2 times per day    aspirin  81 mg Oral Daily    Vitamin D  1,000 Units Oral Daily    cyanocobalamin  1,000 mcg Oral Daily    folic acid  1 mg Oral Daily    gabapentin  100 mg Oral BID    isosorbide mononitrate  30 mg Oral Daily    levothyroxine  100 mcg Oral Daily    midodrine  5 mg Oral TID WC    predniSONE  5 mg Oral Every Other Day    sacubitril-valsartan  1 tablet Oral BID    sevelamer  800 mg Oral TID WC    spironolactone  25 mg Oral Daily    atorvastatin  80 mg Oral Nightly    warfarin (COUMADIN) daily dosing (placeholder)   Other RX Placeholder     Continuous Infusions:   dextrose      sodium chloride         Review of Systems:     All systems reviewed but were negative except as mentioned in HPI    Objective:       Vitals:  BP 87/64   Pulse 78   Temp 98.7 °F (37.1 °C) (Oral) Resp 17   Ht 6' (1.829 m)   Wt 148 lb 2.4 oz (67.2 kg)   SpO2 94%   BMI 20.09 kg/m²   Constitutional:  awake, NAD  HEENT:  MMM, No icterus  Neck: no bruits, No JVD  Cardiovascular:  S1, S2 reg  Respiratory: CTA, no crackles  Abdomen:  +BS, soft, NT, ND  Ext: no lower extremity edema  Access: LUE AVG  CNS: alert, no agitation    Data:     Labs:  Hepatic:   Recent Labs     12/26/21  1209 12/27/21  0650   * 149*   * 135*   BILITOT 0.8 0.6   ALKPHOS 203* 131*     BNP: No results for input(s): BNP in the last 72 hours. ABGs: No results for input(s): PHART, PO2ART, YWX2CSC in the last 72 hours. IMAGING:  XR CHEST PORTABLE   Final Result   Asymmetric ground-glass opacification and pleural fluid in the lower right   chest.  This may represent pulmonary edema or developing pneumonitis. Assessment :       1. ESRD   Etiology: IgAN  Access: AVG  Volume: Euvolemic    2. Electrolytes/Acid base  No Dyskalemia    Recent Labs     12/27/21  0650      K 3.8   CO2 23       3. BMD  -Hyperphosphatemia, SHPT    Lab Results   Component Value Date    .6 (H) 07/21/2016    CALCIUM 9.4 12/27/2021    PHOS 3.8 07/21/2016        4. HTN  -Blood pressure low    BP Readings from Last 1 Encounters:   12/27/21 87/64       5. Anemia  -anemia of CKD    Recent Labs     12/27/21  0650   HGB 9.5*     6. MV stenosis  -severe bioprosthetic valve stenosis    7. CAD/CHF  -TTE: EF 30% to 35%. 8. Diarrhea     PLAN :       - Maintenance HD MWF. Seen on HD  - continue BP meds  - MBD management  - Anemia management  - Dose meds to GFR<10      Thank you for allowing us to participate in care of [facility] . We will continue to follow. Feel free to contact me with any questions.       Julio Cesar Hernández MD  12/27/2021    [facility] [address]  Office : [phone]  Fax :[phone]

## 2021-12-27 NOTE — PROGRESS NOTES
Clinical Pharmacy Note  Warfarin Consult    Patric Barthel is a 64 y.o. male receiving warfarin managed by pharmacy. Warfarin Indication: DVT  Target INR range: 2-3   Dose prior to admission: 5 mg daily     Current warfarin drug-drug interactions: None significant    Recent Labs     12/26/21  1209 12/27/21  0235 12/27/21  0650   HGB 12.1*  --  9.5*   HCT 39.1*  --  28.9*   INR  --  3.32* 3.20*       Assessment/Plan:    Hold warfarin tonight. Daily PT/INR until stable within therapeutic range. Thank you for the consult. Will continue to follow.   Kirstie Vazquez [facility], PharmD, 12/27/2021 1:40 PM

## 2021-12-27 NOTE — PROGRESS NOTES
Verified with Dr. Leonarda Dotson per Dr. Carol Douglas recommendations if there were any changes to medication for discharge.   Replied, \"Current meds ok\"       Electronically signed by Arnie Maxwell RN on 12/27/2021 at 5:17 PM

## 2022-01-12 NOTE — DISCHARGE SUMMARY
Hospitalist Discharge Summary     Rose Mary Domínguez  : 1965  MRN: [id number]    Admit date: 2021  Discharge date: 2021    Admitting Physician: Shayy Garg MD    Discharge Diagnoses:   Patient Active Problem List   Diagnosis    GERD (gastroesophageal reflux disease)    Anger    Depression    [facility]roid cancer ([facility]    S/P kidney transplant    Memory loss    Irritability    Abnormal weight loss    Erectile dysfunction    Decreased libido    Anemia    Folic acid deficiency    Need for SBE (subacute bacterial endocarditis) prophylaxis    Memory loss    Chest pain    Acute [facility] ([facility]    Abdominal pain    Nausea    Diarrhea    Mesenteric ischemia (HCC)    Exertional angina (HCC)    Nonerosive nonspecific gastritis    Gastroparesis    Paraesophageal hiatal hernia    Lower urinary tract symptoms    ESRD (end stage renal disease) on dialysis (HCC)    Hand injury    Toe injury    Fracture of metacarpal neck of right hand, closed    Bilateral low back pain with sciatica    Insomnia    Sty    Essential hypertension    ESRD (end stage renal disease) ([facility]    Hyperkalemia       Admission Condition: fair    Discharged Condition: stable    Discharge Exam:  VITALS:  /78   Pulse 96   Temp 98 °F (36.7 °C) (Oral)   Resp 18   Ht 6' (1.829 m)   Wt 144 lb 13.5 oz (65.7 kg)   SpO2 100%   BMI 19.64 kg/m²   CONSTITUTIONAL:  awake, alert, cooperative, no apparent distress, and appears stated age  EYES:  Lids and lashes normal, PERRL, EOMI, sclera clear, conjunctiva normal  ENT:  NC/AT, MMM    NECK:  Supple, symmetrical, trachea midline, no adenopathy  HEMATOLOGIC/LYMPHATICS:  no cervical, supraclavicular or axillary lymphadenopathy  LUNGS:  clear to auscultation bilaterally, No increased work of breathing, good air exchange, no crackles or wheezing  CARDIOVASCULAR:  Regular rate and rhythm, normal S1 and S2, no S3 or S4, and no significant murmurs, rubs or gallops noted. Normal apical impulse,   ABDOMEN:  Normal active bowel sounds, soft, non-tender, non-distended, no masses palpated, no organomegally  MUSCULOSKELETAL:  Full range of motion noted. NEUROLOGIC:  Awake, alert, oriented to name, place and time. Cranial nerves II-XII are grossly intact. SKIN:  normal skin color, texture, turgor for age. Hospital Course:   Patient is a 78-year-old male with past medical history of ESRD on hemodialysis TTS, CAD, CHF who presents to the hospital for shortness of breath. EMS was called at dialysis facility where patient was found to have shortness of breath, EKG was performed and EMS was called for possible STEMI. Patient otherwise denied any chest pain associated fevers or chills. Possible STEMI -   Ruled out after evaluation      Chronic Issues     1. ESRD   Etiology: IgAN  Access: AVG  Volume: Euvolemic     2. Electrolytes/Acid base  No Dyskalemia         Recent Labs     12/27/21  0650      K 3.8   CO2 23         3. BMD  -Hyperphosphatemia, SHPT           Lab Results   Component Value Date     .6 (H) 07/21/2016     CALCIUM 9.4 12/27/2021     PHOS 3.8 07/21/2016         4. HTN  -Blood pressure low         BP Readings from Last 1 Encounters:   12/27/21 87/64         5. Anemia  -anemia of CKD     Recent Labs     12/27/21  0650   HGB 9.5*      6. MV stenosis  -severe bioprosthetic valve stenosis     7. CAD/CHF  -TTE: EF 30% to 35%.      8.  Diarrhea       Treatments: As described above    Disposition: home    Discharge Medications:       Medication List      CHANGE how you take these medications    predniSONE 5 MG tablet  Commonly known as: DELTASONE  Take 1 tablet by mouth every other day TAKE 1 TABLET BY MOUTH EVERY DAY WITH FOOD  What changed: additional instructions        CONTINUE taking these medications    aspirin 81 MG tablet     atorvastatin 80 MG tablet  Commonly known as: LIPITOR     cyanocobalamin 1000 MCG tablet     Entresto 24-26 MG per tablet  Generic drug: sacubitril-valsartan     folic acid 1 MG tablet  Commonly known as: FOLVITE     gabapentin 100 MG capsule  Commonly known as: NEURONTIN     isosorbide mononitrate 30 MG extended release tablet  Commonly known as: IMDUR     levothyroxine 100 MCG tablet  Commonly known as: SYNTHROID     lidocaine-prilocaine 2.5-2.5 % cream  Commonly known as: EMLA     megestrol 20 MG tablet  Commonly known as: MEGACE     midodrine 5 MG tablet  Commonly known as: PROAMATINE  Take 1 tablet by mouth 3 times daily (with meals)     nitroGLYCERIN 0.4 MG SL tablet  Commonly known as: NITROSTAT     sevelamer 800 MG tablet  Commonly known as: RENVELA  TAKE 1 TABLET BY MOUTH WITH MEALS THREE TIMES DAILY     spironolactone 25 MG tablet  Commonly known as: ALDACTONE     vitamin D 1000 UNIT Tabs tablet  Commonly known as: CHOLECALCIFEROL     warfarin 5 MG tablet  Commonly known as: COUMADIN  Take 1 tablet by mouth daily            35 Minutes spent on patient evaluation, counseling and discharge planning.      Signed:  Louie Contreras MD, MD  1/12/2022, 12:38 PM

## 2022-01-14 ENCOUNTER — TELEPHONE (OUTPATIENT)
Dept: PHARMACY | Age: 57
End: 2022-01-14

## 2022-01-14 NOTE — TELEPHONE ENCOUNTER
Returned call to [employer] at [employer] [facility] #[phone]. She states this patient was supposed to be sent to us at discharge from [facility] [place].  Unfortunately this did not happen. Dr. Dmitry Lerner (Nephrologist) is following this patient at [facility] and wants the [address] [facility] clinic to follow his INR and dose. Dialysis on M/W/F. This patient is having valve replacement on 1-25-22 at [facility].   His is currently on warfarin and has 5 mg tablets at home. He is due in today at 4:30 PM for dialysis and she will get his INR and report it to me. Dosing history:   INR on 1-3-22 = 3.7 and they held the dose times two days. INR on 1-10-22 = 4.3- Hold Monday and Tuesday. Took warfarin 2.5 mg on Wednesday and Thursday. Therapy plan:   INR on 1-14-22 = 2.8  Hold warfarin on 1-14-22. Take warfarin 2.5 mg on 1-15 thru 1-17-22. Stop warfarin on 1-18-22. No further INRs needed  Per Antonietta Faulkner RN they will not be bridging him with Lovenox. Dr. Tarri Mohs is his cardiologist.  His orders were to hold warfarin beginning on 1-18-22. They will start a heparin drip on 1-22-22 at [facility] and have replacement surgery on 1-25-22. [facility], [address] [facility]  Anticoagulation Clinic  [facility].   Rivka Greenberg [address]  [phone]

## 2022-02-23 NOTE — TELEPHONE ENCOUNTER
Reached out to [address] [facility] to verify if this patient is on warfarin and if we should need to start dosing warfarin at some point. I was on hold for 15 minutes. I hung up. I sent a fax to request an update.      If we are to eventually dose the warfarin, we would need a referral.     Mouna Padilla, PharmD, [address] [place] [address]  Anticoagulation Service  [phone]

## 2022-02-23 NOTE — TELEPHONE ENCOUNTER
Spoke with Donna at [facility]. Milton Lyons is still inpatient. I advised I would take him off of our radar and asked that they please let us know going forward should they want us to manage warfarin. She is agreeable to this.      Katy Mccann, PharmD, [address] [place] [address]  Anticoagulation Service  [phone]

## 2023-09-03 ENCOUNTER — APPOINTMENT (OUTPATIENT)
Dept: CT IMAGING | Age: 58
DRG: 056 | End: 2023-09-03
Payer: MEDICARE

## 2023-09-03 ENCOUNTER — HOSPITAL ENCOUNTER (INPATIENT)
Age: 58
LOS: 2 days | Discharge: HOME | DRG: 056 | End: 2023-09-07
Attending: STUDENT IN AN ORGANIZED HEALTH CARE EDUCATION/TRAINING PROGRAM | Admitting: INTERNAL MEDICINE
Payer: MEDICARE

## 2023-09-03 DIAGNOSIS — R29.90 STROKE-LIKE SYMPTOM: Primary | ICD-10-CM

## 2023-09-03 DIAGNOSIS — R56.9 SEIZURE (HCC): ICD-10-CM

## 2023-09-03 LAB
ALBUMIN SERPL-MCNC: 3.4 G/DL (ref 3.4–5)
ALBUMIN/GLOB SERPL: 1.1 {RATIO} (ref 1.1–2.2)
ALP SERPL-CCNC: 244 U/L (ref 40–129)
ALT SERPL-CCNC: 13 U/L (ref 10–40)
ANION GAP SERPL CALCULATED.3IONS-SCNC: 18 MMOL/L (ref 3–16)
APTT BLD: 37.5 SEC (ref 22.7–35.9)
AST SERPL-CCNC: 30 U/L (ref 15–37)
BASOPHILS # BLD: 0.1 K/UL (ref 0–0.2)
BASOPHILS NFR BLD: 1.4 %
BILIRUB SERPL-MCNC: <0.2 MG/DL (ref 0–1)
BUN SERPL-MCNC: 66 MG/DL (ref 7–20)
CALCIUM SERPL-MCNC: 8.9 MG/DL (ref 8.3–10.6)
CHLORIDE SERPL-SCNC: 95 MMOL/L (ref 99–110)
CO2 SERPL-SCNC: 24 MMOL/L (ref 21–32)
CREAT SERPL-MCNC: 9.9 MG/DL (ref 0.9–1.3)
DEPRECATED RDW RBC AUTO: 17.8 % (ref 12.4–15.4)
EOSINOPHIL # BLD: 0.7 K/UL (ref 0–0.6)
EOSINOPHIL NFR BLD: 10 %
ETHANOLAMINE SERPL-MCNC: NORMAL MG/DL (ref 0–0.08)
GFR SERPLBLD CREATININE-BSD FMLA CKD-EPI: 6 ML/MIN/{1.73_M2}
GLUCOSE BLD-MCNC: 104 MG/DL (ref 70–99)
GLUCOSE SERPL-MCNC: 98 MG/DL (ref 70–99)
HCT VFR BLD AUTO: 28.2 % (ref 40.5–52.5)
HGB BLD-MCNC: 9.3 G/DL (ref 13.5–17.5)
INR PPP: 2.16 (ref 0.84–1.16)
LYMPHOCYTES # BLD: 2 K/UL (ref 1–5.1)
LYMPHOCYTES NFR BLD: 26.5 %
MCH RBC QN AUTO: 35.3 PG (ref 26–34)
MCHC RBC AUTO-ENTMCNC: 32.9 G/DL (ref 31–36)
MCV RBC AUTO: 107.3 FL (ref 80–100)
MONOCYTES # BLD: 0.5 K/UL (ref 0–1.3)
MONOCYTES NFR BLD: 7.1 %
NEUTROPHILS # BLD: 4.1 K/UL (ref 1.7–7.7)
NEUTROPHILS NFR BLD: 55 %
PERFORMED ON: ABNORMAL
PLATELET # BLD AUTO: 204 K/UL (ref 135–450)
PMV BLD AUTO: 9.3 FL (ref 5–10.5)
POTASSIUM SERPL-SCNC: 5 MMOL/L (ref 3.5–5.1)
PROT SERPL-MCNC: 6.4 G/DL (ref 6.4–8.2)
PROTHROMBIN TIME: 24 SEC (ref 11.5–14.8)
RBC # BLD AUTO: 2.62 M/UL (ref 4.2–5.9)
SODIUM SERPL-SCNC: 137 MMOL/L (ref 136–145)
T4 FREE SERPL-MCNC: 1.3 NG/DL (ref 0.9–1.8)
TSH SERPL DL<=0.005 MIU/L-ACNC: 0.13 UIU/ML (ref 0.27–4.2)
WBC # BLD AUTO: 7.4 K/UL (ref 4–11)

## 2023-09-03 PROCEDURE — 82077 ASSAY SPEC XCP UR&BREATH IA: CPT

## 2023-09-03 PROCEDURE — 70498 CT ANGIOGRAPHY NECK: CPT

## 2023-09-03 PROCEDURE — 84443 ASSAY THYROID STIM HORMONE: CPT

## 2023-09-03 PROCEDURE — 93005 ELECTROCARDIOGRAM TRACING: CPT | Performed by: STUDENT IN AN ORGANIZED HEALTH CARE EDUCATION/TRAINING PROGRAM

## 2023-09-03 PROCEDURE — 96374 THER/PROPH/DIAG INJ IV PUSH: CPT

## 2023-09-03 PROCEDURE — 99285 EMERGENCY DEPT VISIT HI MDM: CPT

## 2023-09-03 PROCEDURE — 84439 ASSAY OF FREE THYROXINE: CPT

## 2023-09-03 PROCEDURE — 85610 PROTHROMBIN TIME: CPT

## 2023-09-03 PROCEDURE — 84480 ASSAY TRIIODOTHYRONINE (T3): CPT

## 2023-09-03 PROCEDURE — 85730 THROMBOPLASTIN TIME PARTIAL: CPT

## 2023-09-03 PROCEDURE — 80053 COMPREHEN METABOLIC PANEL: CPT

## 2023-09-03 PROCEDURE — 2580000003 HC RX 258: Performed by: STUDENT IN AN ORGANIZED HEALTH CARE EDUCATION/TRAINING PROGRAM

## 2023-09-03 PROCEDURE — 6360000002 HC RX W HCPCS

## 2023-09-03 PROCEDURE — 96365 THER/PROPH/DIAG IV INF INIT: CPT

## 2023-09-03 PROCEDURE — 6360000004 HC RX CONTRAST MEDICATION: Performed by: STUDENT IN AN ORGANIZED HEALTH CARE EDUCATION/TRAINING PROGRAM

## 2023-09-03 PROCEDURE — 85025 COMPLETE CBC W/AUTO DIFF WBC: CPT

## 2023-09-03 PROCEDURE — 70450 CT HEAD/BRAIN W/O DYE: CPT

## 2023-09-03 RX ORDER — LORAZEPAM 2 MG/ML
4 INJECTION INTRAMUSCULAR ONCE
Status: DISCONTINUED | OUTPATIENT
Start: 2023-09-03 | End: 2023-09-07 | Stop reason: HOSPADM

## 2023-09-03 RX ORDER — SODIUM CHLORIDE, SODIUM LACTATE, POTASSIUM CHLORIDE, AND CALCIUM CHLORIDE .6; .31; .03; .02 G/100ML; G/100ML; G/100ML; G/100ML
500 INJECTION, SOLUTION INTRAVENOUS ONCE
Status: COMPLETED | OUTPATIENT
Start: 2023-09-03 | End: 2023-09-04

## 2023-09-03 RX ORDER — LORAZEPAM 2 MG/ML
INJECTION INTRAMUSCULAR
Status: COMPLETED
Start: 2023-09-03 | End: 2023-09-03

## 2023-09-03 RX ORDER — SPIRONOLACTONE 25 MG/1
25 TABLET ORAL DAILY
Status: DISCONTINUED | OUTPATIENT
Start: 2023-09-04 | End: 2023-09-04 | Stop reason: SDUPTHER

## 2023-09-03 RX ADMIN — SODIUM CHLORIDE, POTASSIUM CHLORIDE, SODIUM LACTATE AND CALCIUM CHLORIDE 500 ML: 600; 310; 30; 20 INJECTION, SOLUTION INTRAVENOUS at 22:49

## 2023-09-03 RX ADMIN — IOPAMIDOL 75 ML: 755 INJECTION, SOLUTION INTRAVENOUS at 22:01

## 2023-09-03 RX ADMIN — LORAZEPAM 2 MG: 2 INJECTION INTRAMUSCULAR; INTRAVENOUS at 22:46

## 2023-09-04 ENCOUNTER — APPOINTMENT (OUTPATIENT)
Dept: CT IMAGING | Age: 58
DRG: 056 | End: 2023-09-04
Attending: HOSPITALIST
Payer: MEDICARE

## 2023-09-04 ENCOUNTER — APPOINTMENT (OUTPATIENT)
Dept: CT IMAGING | Age: 58
DRG: 056 | End: 2023-09-04
Payer: MEDICARE

## 2023-09-04 PROBLEM — R53.1 WEAKNESS OF LEFT SIDE OF BODY: Status: ACTIVE | Noted: 2023-09-04

## 2023-09-04 LAB
EKG ATRIAL RATE: 78 BPM
EKG DIAGNOSIS: NORMAL
EKG P AXIS: 78 DEGREES
EKG P-R INTERVAL: 198 MS
EKG Q-T INTERVAL: 458 MS
EKG QRS DURATION: 180 MS
EKG QTC CALCULATION (BAZETT): 522 MS
EKG R AXIS: 109 DEGREES
EKG T AXIS: 73 DEGREES
EKG VENTRICULAR RATE: 78 BPM
GLUCOSE BLD-MCNC: 76 MG/DL (ref 70–99)
PERFORMED ON: NORMAL
T3 SERPL-MCNC: 0.59 NG/ML (ref 0.8–2)

## 2023-09-04 PROCEDURE — G0378 HOSPITAL OBSERVATION PER HR: HCPCS

## 2023-09-04 PROCEDURE — 6360000004 HC RX CONTRAST MEDICATION: Performed by: STUDENT IN AN ORGANIZED HEALTH CARE EDUCATION/TRAINING PROGRAM

## 2023-09-04 PROCEDURE — 87340 HEPATITIS B SURFACE AG IA: CPT

## 2023-09-04 PROCEDURE — 6370000000 HC RX 637 (ALT 250 FOR IP): Performed by: PSYCHIATRY & NEUROLOGY

## 2023-09-04 PROCEDURE — 97166 OT EVAL MOD COMPLEX 45 MIN: CPT

## 2023-09-04 PROCEDURE — 97530 THERAPEUTIC ACTIVITIES: CPT

## 2023-09-04 PROCEDURE — 6370000000 HC RX 637 (ALT 250 FOR IP): Performed by: HOSPITALIST

## 2023-09-04 PROCEDURE — 93010 ELECTROCARDIOGRAM REPORT: CPT | Performed by: INTERNAL MEDICINE

## 2023-09-04 PROCEDURE — 92610 EVALUATE SWALLOWING FUNCTION: CPT

## 2023-09-04 PROCEDURE — 6360000002 HC RX W HCPCS: Performed by: STUDENT IN AN ORGANIZED HEALTH CARE EDUCATION/TRAINING PROGRAM

## 2023-09-04 PROCEDURE — 5A1D70Z PERFORMANCE OF URINARY FILTRATION, INTERMITTENT, LESS THAN 6 HOURS PER DAY: ICD-10-PCS | Performed by: INTERNAL MEDICINE

## 2023-09-04 PROCEDURE — 70498 CT ANGIOGRAPHY NECK: CPT

## 2023-09-04 PROCEDURE — 6370000000 HC RX 637 (ALT 250 FOR IP): Performed by: INTERNAL MEDICINE

## 2023-09-04 PROCEDURE — 2580000003 HC RX 258: Performed by: INTERNAL MEDICINE

## 2023-09-04 PROCEDURE — 94760 N-INVAS EAR/PLS OXIMETRY 1: CPT

## 2023-09-04 PROCEDURE — 97162 PT EVAL MOD COMPLEX 30 MIN: CPT

## 2023-09-04 PROCEDURE — 90935 HEMODIALYSIS ONE EVALUATION: CPT

## 2023-09-04 PROCEDURE — 2580000003 HC RX 258: Performed by: STUDENT IN AN ORGANIZED HEALTH CARE EDUCATION/TRAINING PROGRAM

## 2023-09-04 PROCEDURE — 86706 HEP B SURFACE ANTIBODY: CPT

## 2023-09-04 PROCEDURE — 96365 THER/PROPH/DIAG IV INF INIT: CPT

## 2023-09-04 PROCEDURE — 96375 TX/PRO/DX INJ NEW DRUG ADDON: CPT

## 2023-09-04 PROCEDURE — 36415 COLL VENOUS BLD VENIPUNCTURE: CPT

## 2023-09-04 PROCEDURE — 70450 CT HEAD/BRAIN W/O DYE: CPT

## 2023-09-04 RX ORDER — MIDODRINE HYDROCHLORIDE 5 MG/1
5 TABLET ORAL
Status: DISCONTINUED | OUTPATIENT
Start: 2023-09-04 | End: 2023-09-04

## 2023-09-04 RX ORDER — BACLOFEN 10 MG/1
10 TABLET ORAL 3 TIMES DAILY PRN
COMMUNITY

## 2023-09-04 RX ORDER — ACETAMINOPHEN 325 MG/1
650 TABLET ORAL EVERY 4 HOURS PRN
Status: DISCONTINUED | OUTPATIENT
Start: 2023-09-04 | End: 2023-09-07 | Stop reason: HOSPADM

## 2023-09-04 RX ORDER — LORAZEPAM 0.5 MG/1
0.5 TABLET ORAL EVERY 4 HOURS PRN
Status: DISCONTINUED | OUTPATIENT
Start: 2023-09-04 | End: 2023-09-07 | Stop reason: HOSPADM

## 2023-09-04 RX ORDER — WARFARIN SODIUM 5 MG/1
5 TABLET ORAL DAILY
Status: DISCONTINUED | OUTPATIENT
Start: 2023-09-04 | End: 2023-09-04 | Stop reason: SDUPTHER

## 2023-09-04 RX ORDER — SODIUM CHLORIDE 9 MG/ML
INJECTION, SOLUTION INTRAVENOUS PRN
Status: DISCONTINUED | OUTPATIENT
Start: 2023-09-04 | End: 2023-09-07 | Stop reason: HOSPADM

## 2023-09-04 RX ORDER — LEVETIRACETAM 500 MG/1
250 TABLET ORAL 2 TIMES DAILY
Status: DISCONTINUED | OUTPATIENT
Start: 2023-09-04 | End: 2023-09-07 | Stop reason: HOSPADM

## 2023-09-04 RX ORDER — BACLOFEN 10 MG/1
10 TABLET ORAL 3 TIMES DAILY PRN
Status: DISCONTINUED | OUTPATIENT
Start: 2023-09-04 | End: 2023-09-07 | Stop reason: HOSPADM

## 2023-09-04 RX ORDER — LANOLIN ALCOHOL/MO/W.PET/CERES
1000 CREAM (GRAM) TOPICAL DAILY
COMMUNITY

## 2023-09-04 RX ORDER — WARFARIN SODIUM 5 MG/1
5 TABLET ORAL DAILY
Status: DISCONTINUED | OUTPATIENT
Start: 2023-09-04 | End: 2023-09-04 | Stop reason: DRUGHIGH

## 2023-09-04 RX ORDER — NITROGLYCERIN 0.4 MG/1
0.4 TABLET SUBLINGUAL PRN
Status: DISCONTINUED | OUTPATIENT
Start: 2023-09-04 | End: 2023-09-07 | Stop reason: HOSPADM

## 2023-09-04 RX ORDER — WARFARIN SODIUM 3 MG/1
3 TABLET ORAL
Status: COMPLETED | OUTPATIENT
Start: 2023-09-04 | End: 2023-09-04

## 2023-09-04 RX ORDER — ASPIRIN 81 MG/1
81 TABLET ORAL DAILY
Status: DISCONTINUED | OUTPATIENT
Start: 2023-09-04 | End: 2023-09-07 | Stop reason: HOSPADM

## 2023-09-04 RX ORDER — GABAPENTIN 100 MG/1
100 CAPSULE ORAL DAILY
Status: DISCONTINUED | OUTPATIENT
Start: 2023-09-04 | End: 2023-09-07 | Stop reason: HOSPADM

## 2023-09-04 RX ORDER — POLYETHYLENE GLYCOL 3350 17 G/17G
17 POWDER, FOR SOLUTION ORAL DAILY PRN
Status: DISCONTINUED | OUTPATIENT
Start: 2023-09-04 | End: 2023-09-07 | Stop reason: HOSPADM

## 2023-09-04 RX ORDER — LEVOTHYROXINE SODIUM 0.1 MG/1
100 TABLET ORAL DAILY
Status: DISCONTINUED | OUTPATIENT
Start: 2023-09-04 | End: 2023-09-04

## 2023-09-04 RX ORDER — SODIUM CHLORIDE 0.9 % (FLUSH) 0.9 %
5-40 SYRINGE (ML) INJECTION PRN
Status: DISCONTINUED | OUTPATIENT
Start: 2023-09-04 | End: 2023-09-07 | Stop reason: HOSPADM

## 2023-09-04 RX ORDER — GABAPENTIN 100 MG/1
100 CAPSULE ORAL 2 TIMES DAILY
Status: DISCONTINUED | OUTPATIENT
Start: 2023-09-04 | End: 2023-09-04

## 2023-09-04 RX ORDER — FOLIC ACID 1 MG/1
1 TABLET ORAL DAILY
Status: DISCONTINUED | OUTPATIENT
Start: 2023-09-04 | End: 2023-09-07 | Stop reason: HOSPADM

## 2023-09-04 RX ORDER — MEGESTROL ACETATE 40 MG/1
20 TABLET ORAL DAILY
Status: DISCONTINUED | OUTPATIENT
Start: 2023-09-04 | End: 2023-09-04

## 2023-09-04 RX ORDER — LEVOTHYROXINE SODIUM 0.1 MG/1
100 TABLET ORAL DAILY
Status: DISCONTINUED | OUTPATIENT
Start: 2023-09-05 | End: 2023-09-07 | Stop reason: HOSPADM

## 2023-09-04 RX ORDER — PANTOPRAZOLE SODIUM 40 MG/1
40 TABLET, DELAYED RELEASE ORAL
Status: DISCONTINUED | OUTPATIENT
Start: 2023-09-04 | End: 2023-09-07 | Stop reason: HOSPADM

## 2023-09-04 RX ORDER — SPIRONOLACTONE 25 MG/1
25 TABLET ORAL DAILY
Status: DISCONTINUED | OUTPATIENT
Start: 2023-09-04 | End: 2023-09-04

## 2023-09-04 RX ORDER — ONDANSETRON 4 MG/1
4 TABLET, ORALLY DISINTEGRATING ORAL EVERY 8 HOURS PRN
Status: DISCONTINUED | OUTPATIENT
Start: 2023-09-04 | End: 2023-09-07 | Stop reason: HOSPADM

## 2023-09-04 RX ORDER — ONDANSETRON 2 MG/ML
4 INJECTION INTRAMUSCULAR; INTRAVENOUS EVERY 6 HOURS PRN
Status: DISCONTINUED | OUTPATIENT
Start: 2023-09-04 | End: 2023-09-07 | Stop reason: HOSPADM

## 2023-09-04 RX ORDER — CARVEDILOL 25 MG/1
25 TABLET ORAL 2 TIMES DAILY WITH MEALS
Status: DISCONTINUED | OUTPATIENT
Start: 2023-09-04 | End: 2023-09-07 | Stop reason: HOSPADM

## 2023-09-04 RX ORDER — ISOSORBIDE MONONITRATE 30 MG/1
30 TABLET, EXTENDED RELEASE ORAL DAILY
Status: DISCONTINUED | OUTPATIENT
Start: 2023-09-04 | End: 2023-09-04

## 2023-09-04 RX ORDER — LEVOTHYROXINE SODIUM 112 UG/1
112 TABLET ORAL DAILY
COMMUNITY

## 2023-09-04 RX ORDER — ATORVASTATIN CALCIUM 80 MG/1
80 TABLET, FILM COATED ORAL DAILY
Status: DISCONTINUED | OUTPATIENT
Start: 2023-09-04 | End: 2023-09-07 | Stop reason: HOSPADM

## 2023-09-04 RX ORDER — SEVELAMER CARBONATE 800 MG/1
800 TABLET, FILM COATED ORAL
Status: DISCONTINUED | OUTPATIENT
Start: 2023-09-04 | End: 2023-09-07 | Stop reason: HOSPADM

## 2023-09-04 RX ORDER — SODIUM CHLORIDE 0.9 % (FLUSH) 0.9 %
5-40 SYRINGE (ML) INJECTION EVERY 12 HOURS SCHEDULED
Status: DISCONTINUED | OUTPATIENT
Start: 2023-09-04 | End: 2023-09-07 | Stop reason: HOSPADM

## 2023-09-04 RX ORDER — VITAMIN B COMPLEX
1 TABLET ORAL DAILY
Status: DISCONTINUED | OUTPATIENT
Start: 2023-09-04 | End: 2023-09-07 | Stop reason: HOSPADM

## 2023-09-04 RX ORDER — CARVEDILOL 25 MG/1
25 TABLET ORAL 2 TIMES DAILY WITH MEALS
COMMUNITY

## 2023-09-04 RX ADMIN — LEVETIRACETAM 250 MG: 500 TABLET, FILM COATED ORAL at 21:12

## 2023-09-04 RX ADMIN — LEVETIRACETAM 1500 MG: 100 INJECTION, SOLUTION INTRAVENOUS at 00:36

## 2023-09-04 RX ADMIN — ATORVASTATIN CALCIUM 80 MG: 80 TABLET, FILM COATED ORAL at 08:10

## 2023-09-04 RX ADMIN — FOLIC ACID 1 MG: 1 TABLET ORAL at 08:10

## 2023-09-04 RX ADMIN — IOPAMIDOL 75 ML: 755 INJECTION, SOLUTION INTRAVENOUS at 21:50

## 2023-09-04 RX ADMIN — ACETAMINOPHEN 650 MG: 325 TABLET ORAL at 07:06

## 2023-09-04 RX ADMIN — SACUBITRIL AND VALSARTAN 1 TABLET: 24; 26 TABLET, FILM COATED ORAL at 21:12

## 2023-09-04 RX ADMIN — ASPIRIN 81 MG: 81 TABLET, COATED ORAL at 08:10

## 2023-09-04 RX ADMIN — SEVELAMER CARBONATE 800 MG: 800 TABLET, FILM COATED ORAL at 08:10

## 2023-09-04 RX ADMIN — SEVELAMER CARBONATE 800 MG: 800 TABLET, FILM COATED ORAL at 12:35

## 2023-09-04 RX ADMIN — CARVEDILOL 25 MG: 25 TABLET, FILM COATED ORAL at 08:10

## 2023-09-04 RX ADMIN — WARFARIN SODIUM 3 MG: 3 TABLET ORAL at 21:12

## 2023-09-04 RX ADMIN — SODIUM CHLORIDE, PRESERVATIVE FREE 10 ML: 5 INJECTION INTRAVENOUS at 21:13

## 2023-09-04 RX ADMIN — PANTOPRAZOLE SODIUM 40 MG: 40 TABLET, DELAYED RELEASE ORAL at 06:01

## 2023-09-04 RX ADMIN — Medication 1000 UNITS: at 08:10

## 2023-09-04 RX ADMIN — SODIUM CHLORIDE, PRESERVATIVE FREE 10 ML: 5 INJECTION INTRAVENOUS at 08:11

## 2023-09-04 RX ADMIN — SACUBITRIL AND VALSARTAN 1 TABLET: 24; 26 TABLET, FILM COATED ORAL at 08:10

## 2023-09-04 RX ADMIN — LEVOTHYROXINE SODIUM 212 MCG: 0.11 TABLET ORAL at 06:01

## 2023-09-04 RX ADMIN — GABAPENTIN 100 MG: 100 CAPSULE ORAL at 08:10

## 2023-09-04 ASSESSMENT — PAIN SCALES - GENERAL
PAINLEVEL_OUTOF10: 0
PAINLEVEL_OUTOF10: 5
PAINLEVEL_OUTOF10: 3
PAINLEVEL_OUTOF10: 0
PAINLEVEL_OUTOF10: 0

## 2023-09-04 ASSESSMENT — PAIN DESCRIPTION - DESCRIPTORS
DESCRIPTORS: ACHING;DISCOMFORT
DESCRIPTORS: ACHING;DISCOMFORT

## 2023-09-04 ASSESSMENT — PAIN - FUNCTIONAL ASSESSMENT
PAIN_FUNCTIONAL_ASSESSMENT: ACTIVITIES ARE NOT PREVENTED
PAIN_FUNCTIONAL_ASSESSMENT: ACTIVITIES ARE NOT PREVENTED

## 2023-09-04 ASSESSMENT — PAIN DESCRIPTION - PAIN TYPE: TYPE: CHRONIC PAIN

## 2023-09-04 ASSESSMENT — PAIN DESCRIPTION - ORIENTATION
ORIENTATION: LOWER
ORIENTATION: MID

## 2023-09-04 ASSESSMENT — PAIN DESCRIPTION - LOCATION
LOCATION: NECK
LOCATION: BACK

## 2023-09-04 NOTE — PROGRESS NOTES
Clinical Pharmacy Note  Warfarin Consult    Naveen Perez is a 62 y.o. male receiving warfarin managed by pharmacy. Patient being bridged with none. Warfarin Indication: Hx DVT/PE  Target INR range: 2-3   Dose prior to admission: 3mg qd    Current warfarin drug-drug interactions:     Recent Labs     09/03/23  2201   HGB 9.3*   HCT 28.2*   INR 2.16*       Assessment/Plan:    Warfarin 3 mg tonight. Daily PT/INR until stable within therapeutic range. Thank you for the consult. Will continue to follow.    Electronically signed by Bailey Harris [facility] on 9/4/2023 at 6:17 AM

## 2023-09-04 NOTE — DIALYSIS
Treatment time: 3 hours  Net UF: 1000 ml     Pre weight: 58.2 kg  Post weight:57.3 kg  EDW: challenging       Access used: L AVF     Access function: well with  ml/min     Medications or blood products given: none      Regular outpatient schedule: MWF     Summary of response to treatment: Patient c/o left sided numbness during HD. Floor RN notified. While floor RN was at bedside she called a rapid response as the patient then reported numbness went to the right side and now unable to swallow. Hospitalist and RN supervisor at bedside. Blood rinsed back and patient taken off HD 9 minutes early. Post /776 and HR 84. Patient now on his way to CT scan.

## 2023-09-04 NOTE — PROGRESS NOTES
Dr. Mae Pour in to see patient. New order for regular adult diet and thin liquids.  Dr. Mae Pour ok with no renal diet per patient wife request.

## 2023-09-04 NOTE — PROGRESS NOTES
With permission from this patient I went to room 5107 and spoke to his wife about why the Code Stroke/Rapid Response team was called to the dialysis room and location of this patient. She was updated on his plan of care and I addressed all questions and concerns that she had.

## 2023-09-04 NOTE — PROGRESS NOTES
Patient wife unsure of entire home medication list. Stated she would either call with a list or bring one in this AM to complete patient med rec. Patient remains post-ictal and withdrawn at this time.

## 2023-09-04 NOTE — PROGRESS NOTES
Nephrology note: The patient was seen and examined on hemodialysis  The patient potassium level is 5, so agree use 2K bath  The patient blood pressure is on the high side, so we will try to bring the patient down to evaluate. Spoke with patient's dialysis nurse.

## 2023-09-04 NOTE — PLAN OF CARE
Problem: Discharge Planning  Goal: Discharge to home or other facility with appropriate resources  9/4/2023 1138 by Jhon Juarez RN  Outcome: Progressing     Problem: Skin/Tissue Integrity  Goal: Absence of new skin breakdown  Description: 1. Monitor for areas of redness and/or skin breakdown  2. Assess vascular access sites hourly  3. Every 4-6 hours minimum:  Change oxygen saturation probe site  4. Every 4-6 hours:  If on nasal continuous positive airway pressure, respiratory therapy assess nares and determine need for appliance change or resting period.   Outcome: Progressing     Problem: Safety - Adult  Goal: Free from fall injury  9/4/2023 1138 by Jhon Juarez RN  Outcome: Progressing     Problem: ABCDS Injury Assessment  Goal: Absence of physical injury  Outcome: Progressing     Problem: Neurosensory - Adult  Goal: Achieves stable or improved neurological status  9/4/2023 1138 by Jhon Juarez RN  Outcome: Progressing     Problem: Neurosensory - Adult  Goal: Absence of seizures  9/4/2023 1138 by Jhon Juarez RN  Outcome: Progressing     Problem: Neurosensory - Adult  Goal: Remains free of injury related to seizures activity  9/4/2023 1138 by Jhon Juarez RN  Outcome: Progressing     Problem: Neurosensory - Adult  Goal: Achieves maximal functionality and self care  9/4/2023 1138 by Jhon Juarez RN  Outcome: Progressing     Problem: Pain  Goal: Verbalizes/displays adequate comfort level or baseline comfort level  Outcome: Progressing

## 2023-09-04 NOTE — PROGRESS NOTES
Occupational Therapy  Facility/Department: [facility] PROGRESSIVE CARE  Occupational Therapy Initial Assessment and Tentative D/C      Name: Layla Shin  : 1965  MRN: [id number]  Date of Service: 2023    Discharge Recommendations: Layla Shin scored a 18/24 on the AM-PAC ADL Inpatient form. Current research shows that an AM-PAC score of 18 or greater is typically associated with a discharge to the patient's home setting. Based on the patient's AM-PAC score, and their current ADL deficits, it is recommended that the patient have 2-3 sessions per week of Occupational Therapy at d/c to increase the patient's independence. At this time, this patient demonstrates the endurance and safety to discharge home with [facility] and a follow up treatment frequency of 2-3x/wk. Please see assessment section for further patient specific details. If patient discharges prior to next session this note will serve as a discharge summary. Please see below for the latest assessment towards goals. Continue to assess pending progress, 2-3 sessions per week, Patient would benefit from continued therapy after discharge  OT Equipment Recommendations  Equipment Needed: No       Patient Diagnosis(es): There were no encounter diagnoses.   Past Medical History:  has a past medical history of Abnormal weight loss, Abnormal weight loss, Anemia, Anger, Buerger's disease ([address]), CAD (coronary artery disease), CHF (congestive heart failure) ([address]), Chronic kidney disease, stage IV (severe) ([address]), Chronic kidney disease, stage V ([address]), Decreased libido, Depression, DJD (degenerative joint disease) of cervical spine, DJD (degenerative joint disease), lumbosacral, Erectile dysfunction, ESRD, ESRD (end stage renal disease) on dialysis ([address]), Essential hypertension, Fecal incontinence, Folic acid deficiency, Fracture, Gastritis, Gastroparesis, GERD (gastroesophageal reflux disease), H/O mitral valve replacement, Hemodialysis patient ([address]),

## 2023-09-04 NOTE — PROGRESS NOTES
Facility/Department: [address] PROGRESSIVE CARE  Initial Assessment  DYSPHAGIA BEDSIDE SWALLOW EVALUATION     Patient: Ladi Lewis   : 1965   MRN: [id number]      Evaluation Date: 2023   Admitting Diagnosis: Weakness of left side of body [R53.1]  Pain:back/neck; repositioned,wife assisting with massage; nsg notified                                                        Chart Review:   H&P:   9/3/2023:  Left-sided weakness with findings consistent with acute CVA. May however be due to Joel's paralysis. CT CTA negative. Patient declined TKN. MRI in the a.m. Neurology consultation   New onset seizure. EEG in the a.m., but will defer to neurology. Neurology has been consulted. Keppra 750 mg p.o. twice daily after he received a loading dose in the emergency room. Seizure precautions. End-stage renal disease on hemodialysis: We will consult nephrology as patient is scheduled for dialysis in the a.m. Ananda Castellanos Thyroid nodule with abnormal thyroid function test: Patient being worked up as an outpatient   Other chronic medical conditions including chronic anemia, pacemaker/defibrillator, status post pacer extraction due to pocket hematoma history of pulm embolism, status post IVC filter. Patient is anticoagulated. History of mitral valve prolapse-status post vertebral replacement, peripheral vascular disease leading to bilateral AKA    Current Diet Level (prior to evaluation): NPO  NPO   Respiratory Status:   [x]Room Air   []O2 via nasal cannula   []Other:    Imaging:  Chest X-ray:   none in chart    Head CT: 9/3/2023  IMPRESSION:  1. No acute intracranial abnormality. 2. Chronic infarct in the posterior right cerebral artery territory. The findings were sent to the Radiology Results [address] at 10:11  pm on 9/3/2023 to be communicated to a licensed caregiver.     MRI:   ordered and pending 2023    Modified Barium Swallow Study: none per chart or pt spouse    Reason for referral: SLP

## 2023-09-04 NOTE — PROGRESS NOTES
Patient wife called and able to name off patient home medications and dosages. Relayed to pharmacy via phone call. Patient much more alert and awake at this time. Complaining of back pain and asking for Tylenol. No PRN orders in EMar. Also no PRN Ativan orders, unsure if wanting incase of another seizure. Perfect serve message sent to MD regarding these, awaiting response.

## 2023-09-04 NOTE — CODE DOCUMENTATION
Pt in dialysis - pt c/o of right sided numbness and difficulty swallowing. Bedside RN called code stroke. MD at bedside.

## 2023-09-04 NOTE — ED NOTES
Pt returned from CT at this time    As pt was started into the CT scanner, pt stated he was starting to have numbness in his neck and headed down his left arm as it was when the event started. Upon moving pt back into his ED room, pt states that numbness is again improved.       Elysia Garcia RN  09/03/23 [id number] John De Souza RN  09/03/23 2409

## 2023-09-04 NOTE — PROGRESS NOTES
Physical Therapy  Facility/Department: [address] PROGRESSIVE CARE  Physical Therapy Initial Assessment    Name: Abbi Fields  : 1965  MRN: [id number]  Date of Service: 2023    Discharge Recommendations:  Home with Home health PT, Patient would benefit from continued therapy after discharge (initial 24 hr supervision)   PT Equipment Recommendations  Equipment Needed: No      HOME HEALTH CARE: LEVEL 1 STANDARD     -Initial home health evaluation to occur within 24-48 hours, in patient home    -Home health agency to establish plan of care for patient over 60 day period    -Medication Reconciliation    -PCP Visit scheduled within seven days of discharge    -PT/OT to evaluate with goal of regaining prior level of functioning    -OT to evaluate if patient has [address] needs for personal care        Patient Diagnosis(es): There were no encounter diagnoses. Past Medical History:  has a past medical history of Abnormal weight loss, Abnormal weight loss, Anemia, Anger, Buerger's disease ([address]), CAD (coronary artery disease), CHF (congestive heart failure) ([address]), Chronic kidney disease, stage IV (severe) ([address]), Chronic kidney disease, stage V ([address]), Decreased libido, Depression, DJD (degenerative joint disease) of cervical spine, DJD (degenerative joint disease), lumbosacral, Erectile dysfunction, ESRD, ESRD (end stage renal disease) on dialysis ([address]), Essential hypertension, Fecal incontinence, Folic acid deficiency, Fracture, Gastritis, Gastroparesis, GERD (gastroesophageal reflux disease), H/O mitral valve replacement, Hemodialysis patient ([address]), Hypertension, Hypothyroidism, Iron deficiency anemia, Irritability, Keloids, Kidney transplants, Lower urinary tract symptoms, Memory loss, MI (myocardial infarction) ([address]), Nonerosive nonspecific gastritis, Paraesophageal hiatal hernia, S/P kidney transplant, Sigmoid polyp, Thyroid cancer ([address]), and Thyroid nodules.   Past Surgical History:  has a past surgical

## 2023-09-04 NOTE — PROGRESS NOTES
NAME:  Jorge Chau  YOB: 1965  MEDICAL RECORD NUMBER:  [id number]  TODAYS DATE:  9/4/2023    Discussed personal risk factors for Stroke/TIA with patient/family, and ways to reduce the risk for a recurrent stroke. Patient's personal risk factors which were identified are:     [] Alcohol Abuse: check with your physician before any alcohol consumption. [] Atrial fibrillation: may cause blood clots. [] Drug Abuse: Seek help, talk with your doctor  [] Clotting Disorder  [] Diabetes  [] Family history of stroke or heart disease  [x] High Blood Pressure/Hypertension: work with your physician.  [] High cholesterol: monitor cholesterol levels with your physician.   [] Overweight/Obesity: work with your physician for your ideal body weight.  [] Physical Inactivity: get regular exercise as directed by your physician. [x] Personal history of previous TIA or stroke  [] Poor Diet; decrease salt (sodium) in your diet, follow diet directed by physician. [] Smoking: Cigarette/Cigar: stop smoking. Advised pt. that you can reduce your risk for stroke/TIA by modifying/controlling the risk factors that you have. Pt.advised to take the medications as prescribed, which will be detailed in the discharge instructions, and to not stop taking them without consulting their physician. In addition, pt. advised to maintain a healthy diet, exercise regularly and to not smoke. [facility]'s Stroke treatment and prevention, Managing your recovery  notebook  provided and/or reviewed  with patient/family. The notebook includes, but not limited to, sections addressing warning signs & symptoms of a stroke, which are: sudden numbness or weakness especially on one side of the body, sudden confusion, difficulty speaking or understanding, sudden changes in vision, sudden dizziness or loss of balance/ coordination, sudden severe headache, syncope and seizure.   The need to call EMS (911) immediately if signs & symptoms

## 2023-09-04 NOTE — PROGRESS NOTES
ANDRE WITH SPEECH SEEN PATIENT THIS MORNING AND STATED PATIENT DIET CAN UPGRADE TO REGULAR DIET AND THIN LIQUIDS. PATIENT WIFE STATED SHE DOES NOT WANT HIM ON A RENAL DIET. NOTIFIED MD, AWAITING RESPONSE. MRI STATED NO TESTING WILL BE DONE FROM THEIR DEPT.  TODAY D/T THE HOLIDAY

## 2023-09-04 NOTE — ED NOTES
Report called to ELISA Mccartney for Rm [id number]. Questions addressed and report accepted. Transport to be arranged.       Wero Pitt RN  09/04/23 [id number]

## 2023-09-04 NOTE — PROGRESS NOTES
Rapid response follow up   CT head shows loss of gray-white matter differentiation in the posterior right temporoparietal lobe, which demonstrated changes of encephalomalacia on the previous exam of 09/03/2023. Findings may represent acute on chronic infarct. Follow-up MRI exam is recommended for further evaluation. I spoke with on call stroke team who recommended CTA head/neck. I spoke with radiology tech at [facility] [facility] and requested CT head images to be pushed through for stroke team to review.

## 2023-09-04 NOTE — DIALYSIS
Patient reports he is starting to feel numbness on left side. Reports \"normally this is how I feel when I am going to have a seizure\". Dialysis RN  reached out to floor RN (Bryan Andrews).  Floor RN to notify hospitalist.

## 2023-09-04 NOTE — PROGRESS NOTES
4 Eyes Skin Assessment     NAME:  Yun Khoury  YOB: 1965  MEDICAL RECORD NUMBER:  [id number]    The patient is being assessed for  Admission    I agree that at least one RN has performed a thorough Head to Toe Skin Assessment on the patient. ALL assessment sites listed below have been assessed. Areas assessed by both nurses:    Head, Face, Ears, Shoulders, Back, Chest, Arms, Elbows, Hands, Sacrum. Buttock, Coccyx, Ischium, and Legs. Feet and Heels        Does the Patient have a Wound? Yes wound(s) were present on assessment.  LDA wound assessment was Initiated and completed by RN       Brayan Prevention initiated by RN: Yes  Wound Care Orders initiated by RN: Yes    Pressure Injury (Stage 3,4, Unstageable, DTI, NWPT, and Complex wounds) if present, place Wound referral order by RN under : Yes    New Ostomies, if present place, Ostomy referral order under : No     Nurse 1 eSignature: Electronically signed by Haim Forbes RN on 9/4/23 at 2:53 AM EDT    **SHARE this note so that the co-signing nurse can place an eSignature**    Nurse 2 eSignature: Electronically signed by Max Adams RN on 9/4/23 at 5:35 AM EDT

## 2023-09-04 NOTE — PROGRESS NOTES
Hospitalist Progress Note  9/4/2023 8:55 AM    PCP: Sandhya Martell MD    [id number]     Date of Admission: 9/3/2023                                                                                                                     HOSPITAL COURSE    Patient demographics:  The patient  Liam Garrison is a 62 y.o. male     Significant past medical history:   Patient Active Problem List   Diagnosis    GERD (gastroesophageal reflux disease)    Anger    Depression    Thyroid cancer ([address])    S/P kidney transplant    Memory loss    Irritability    Abnormal weight loss    Erectile dysfunction    Decreased libido    Anemia    Folic acid deficiency    Need for SBE (subacute bacterial endocarditis) prophylaxis    Memory loss    Chest pain    Acute kidney injury ([address])    Abdominal pain    Nausea    Diarrhea    Mesenteric ischemia (HCC)    Exertional angina (HCC)    Nonerosive nonspecific gastritis    Gastroparesis    Paraesophageal hiatal hernia    Lower urinary tract symptoms    ESRD (end stage renal disease) on dialysis (HCC)    Hand injury    Toe injury    Fracture of metacarpal neck of right hand, closed    Bilateral low back pain with sciatica    Insomnia    Sty    Essential hypertension    ESRD (end stage renal disease) (HCC)    Hyperkalemia    Weakness of left side of body         Presenting symptoms:  left-sided weakness    Diagnostic workup:      CONSULTS DURING ADMISSION :   IP CONSULT TO NEUROLOGY  IP CONSULT TO NEPHROLOGY  IP CONSULT TO NEUROLOGY  PHARMACY TO DOSE WARFARIN      Patient was diagnosed with:  Thyroid cancer  New onset seizure  End-stage renal disease on hemodialysis  Thyroid nodule    Treatment while inpatient:  Liam Garrison is a 62 y.o. male with past medical history significant for  Abnormal weight loss, Anemia, Buerger's disease , CAD, CHF, ESRD, DJD, Lower urinary tract symptoms, Paraesophageal hiatal hernia, S/P kidney transplant, Thyroid cancer    Patient presented to the ER with complaint

## 2023-09-04 NOTE — PROGRESS NOTES
Responded to Rapid response   Patient during dialysis had episode of visual changes, c/o numbness upper body non specific. NIH score of 1 per nursing. Patient was evaluated at bedside. He was only left with 9 min of dialysis. Patient was alert oriented x 3, no sensori motor deficit. NIH score of 1 , peripheral vision improved. /86, BG in 78, non diabetic. Neuro on call consulted. Patient at this time does not need vascular imaging. Will do non contrast CT head. Patient is almost back to base line. Will continue to monitor symptoms. If passed bedside swallow patient can eat.  Case discussed with Code stroke team and on call neurologist.

## 2023-09-04 NOTE — CONSULTS
NEPHROLOGY CONSULT NOTE  [facility]      ASSESSMENT and PLAN:  ESRD:  Longstanding, maintained on hemodialysis, Monday, Wednesday and Friday  We will arrange for hemodialysis to be done today    Anemia:  The patient's most recent hemoglobin was 9.3  Therefore, he will get the Procrit injection    Hypertension:  The patient blood pressure was 141/74  Reviewed his medication list, for blood pressure control, he is getting carvedilol 25    Congestive heart failure: The patient is getting Entresto and his heart failure appears to be well controlled    Seizure disorder:   He this time presented with seizure disorder  Neurology service has been consulted and evaluation is underway. S: Seizure    HPI:  He is 62years old, initially presented to the [facility] emergency room on 9/3/2023 after suffered 1 episode of seizure activity. Per patient, symptoms started with left-sided jerking movement and then it turned to seizure activity. Per patient, he has had transient loss of consciousness. Because of that, the patient was brought to the emergency room for further evaluation. CT scan head was negative. Neurology service has been consulted, concerned about CVA. This gentleman has a very complex and complicated medical problems including lungs and history of ESRD due to failed kidney transplant, maintained on hemodialysis, history of stroke, CAD, severe ischemic limbs, status post bilateral above-knee amputation. I saw the patient and he looked a pale and frail, but he was not under any acute distress. He still recognize me by name. He told me that now he is under the care of Dr. Tomeka Caceres for his hemodialysis. Prior to this episode illness, the patient to me that he had been in his usual state of health. He was able to eat and drink.   Problem list:  Patient Active Problem List   Diagnosis    GERD (gastroesophageal reflux disease)    Anger    Depression    Thyroid cancer ([address])    S/P gallops  Extremities - bilateral AKA, no edema in stump    STUDIES:  Lab Results   Component Value Date    CREATININE 9.9 (HH) 09/03/2023    BUN 66 (H) 09/03/2023     09/03/2023    K 5.0 09/03/2023    CL 95 (L) 09/03/2023    CO2 24 09/03/2023     Lab Results   Component Value Date    CALCIUM 8.9 09/03/2023    PHOS 3.8 07/21/2016     Lab Results   Component Value Date    WBC 7.4 09/03/2023    HGB 9.3 (L) 09/03/2023    HCT 28.2 (L) 09/03/2023    .3 (H) 09/03/2023     09/03/2023         Thank you for allowing me to participate in this patient's care. Please contact me for any questions.     Daniel Valencia MD  [phone]

## 2023-09-04 NOTE — PROGRESS NOTES
Patient arrived to floor via stretcher from ED and transferred to bed. Wife at bedside. Telemetry activated and confirmed with CMU. Patient oriented to room and use of call light. Call light and personal items within reach. Admission and assessment initiated. POC and education initiated and reviewed with patient/family. MRI checklist completed with wife at bedside. Patient very lethargic/withdrawn from recent Ativan dose. NIH score 1 from what able to score, patient very withdrawn at this time.

## 2023-09-04 NOTE — CARE COORDINATION
Case Management Assessment  Initial Evaluation    Date/Time of Evaluation: 9/4/2023 3:27 PM  Assessment Completed by: Nabila Meyer    If patient is discharged prior to next notation, then this note serves as note for discharge by case management. Patient Name: Clarence Sow                   YOB: 1965  Diagnosis: Weakness of left side of body [R53.1]                   Date / Time: 9/3/2023  9:39 PM    Patient Admission Status: Observation   Readmission Risk (Low < 19, Mod (19-27), High > 27): No data recorded  Current PCP: Clayton Shepard MD  PCP verified by CM? Yes    Chart Reviewed: Yes      History Provided by: Significant Other  Patient Orientation: Unable to Assess    Patient Cognition: Other (see comment) (DENNIS, in bed with eyes closed)    Hospitalization in the last 30 days (Readmission):  No    If yes, Readmission Assessment in  Navigator will be completed.     Advance Directives:      Code Status: Full Code   Patient's Primary Decision Maker is: Patient Declined (Legal Next of Kin Remains as Decision Maker)    Primary Decision Maker: Ye Eaton - Spouse - [phone]    Secondary Decision Maker: Kristina Storm - Brother/Sister - [phone]    Discharge Planning:    Patient lives with: Spouse/Significant Other Type of Home: House  Primary Care Giver: Spouse  Patient Support Systems include: Spouse/Significant Other   Current Financial resources: Medicare  Current community resources: None  Current services prior to admission: Durable Medical Equipment            Current DME: Wheelchair, Bedside Commode, Hospital Bed            Type of Home Care services:  None    ADLS  Prior functional level: Assistance with the following:, Bathing, Dressing, Toileting, Feeding, Cooking, Housework, Shopping, Mobility  Current functional level: Assistance with the following:, Bathing, Dressing, Toileting, Feeding, Mobility, Shopping, Housework, Cooking    PT AM-PAC: 10 /24  OT AM-PAC: 18 /24    Family can provide assistance at DC: Yes  Would you like Case Management to discuss the discharge plan with any other family members/significant others, and if so, who? Yes (spouse, Kasia Hughes)  Plans to Return to Present Housing: Yes  Other Identified Issues/Barriers to RETURNING to current housing: none  Potential Assistance needed at discharge: N/A            Potential DME:    Patient expects to discharge to: [address] [facility] for transportation at discharge:      Financial    Payor: [address] [place] [address] Road / Plan: Myla Keith ESSENTIAL/PLUS / Product Type: *No Product type* /     Does insurance require precert for SNF: Yes    Potential assistance Purchasing Medications: No  Meds-to-Beds request:        [facility]/pharmacy #[id number]- [place], [address] [facility]. Darrellnayeli Moralez [phone] - F [address] [facility]. [facility] [address]  Phone: [phone] Fax: [phone]    [facility] [address] [place] [phone] Beatriceisabel CovingtonHelen [phone]  [facility] [address] [place] [id number]  Phone: [phone] Fax: [phone]      Notes:    Factors facilitating achievement of predicted outcomes: Family support    Barriers to discharge: Medical complications    Additional Case Management Notes: Met with pt and spouse, Kasia Hughes, at bedside. Kasia Hughes is primary caregiver and completed assessment. Pt is from home with spouse. Denies current services. Has a wheelchair, bedside commode and hospital bed at home. Pt is not an active , his wife provides transport and will be available to transport home at discharge. He has a PCP that he is active with. He is a patient of [facility] in [place]. Spouse stated patient has been to [facility] in the past but they \"keep dropping the ball\". She asked for SNF list so she could look into potential rehab later down the road; [facility] Medicare list provided.   [facility] was brought up; spouse stated it will not be necessary and that she provides all therapy

## 2023-09-04 NOTE — PLAN OF CARE
Problem: Discharge Planning  Goal: Discharge to home or other facility with appropriate resources  9/4/2023 0517 by Joey Hall RN  Outcome: Progressing  Flowsheets (Taken 9/4/2023 0200)  Discharge to home or other facility with appropriate resources:   Identify barriers to discharge with patient and caregiver   Arrange for needed discharge resources and transportation as appropriate   Identify discharge learning needs (meds, wound care, etc)  9/4/2023 0516 by Joey Hall RN  Outcome: Progressing  Flowsheets (Taken 9/4/2023 0200)  Discharge to home or other facility with appropriate resources:   Identify barriers to discharge with patient and caregiver   Arrange for needed discharge resources and transportation as appropriate   Identify discharge learning needs (meds, wound care, etc)     Problem: Safety - Adult  Goal: Free from fall injury  9/4/2023 0517 by Joey Hall RN  Outcome: Progressing  Flowsheets (Taken 9/4/2023 0517)  Free From Fall Injury: Instruct family/caregiver on patient safety  9/4/2023 0516 by Joey Hall RN  Outcome: Progressing     Problem: Neurosensory - Adult  Goal: Achieves stable or improved neurological status  Outcome: Progressing  Flowsheets (Taken 9/4/2023 0517)  Achieves stable or improved neurological status: Assess for and report changes in neurological status  Goal: Absence of seizures  Outcome: Progressing  Flowsheets (Taken 9/4/2023 0517)  Absence of seizures:   Monitor for seizure activity.   If seizure occurs, document type and location of movements and any associated apnea   If seizure occurs, turn head to side and suction secretions as needed   Administer anticonvulsants as ordered  Goal: Remains free of injury related to seizures activity  Outcome: Progressing  Flowsheets (Taken 9/4/2023 0517)  Remains free of injury related to seizure activity:   Maintain airway, patient safety  and administer oxygen as ordered   Monitor

## 2023-09-05 ENCOUNTER — APPOINTMENT (OUTPATIENT)
Dept: MRI IMAGING | Age: 58
DRG: 056 | End: 2023-09-05
Payer: MEDICARE

## 2023-09-05 ENCOUNTER — APPOINTMENT (OUTPATIENT)
Dept: GENERAL RADIOLOGY | Age: 58
DRG: 056 | End: 2023-09-05
Payer: MEDICARE

## 2023-09-05 PROBLEM — R53.1 LEFT-SIDED WEAKNESS: Status: ACTIVE | Noted: 2023-09-05

## 2023-09-05 LAB
ANION GAP SERPL CALCULATED.3IONS-SCNC: 12 MMOL/L (ref 3–16)
BUN SERPL-MCNC: 36 MG/DL (ref 7–20)
CALCIUM SERPL-MCNC: 8.8 MG/DL (ref 8.3–10.6)
CHLORIDE SERPL-SCNC: 94 MMOL/L (ref 99–110)
CHOLEST SERPL-MCNC: 101 MG/DL (ref 0–199)
CO2 SERPL-SCNC: 27 MMOL/L (ref 21–32)
CREAT SERPL-MCNC: 6.6 MG/DL (ref 0.9–1.3)
DEPRECATED RDW RBC AUTO: 17.6 % (ref 12.4–15.4)
GFR SERPLBLD CREATININE-BSD FMLA CKD-EPI: 9 ML/MIN/{1.73_M2}
GLUCOSE BLD-MCNC: 126 MG/DL (ref 70–99)
GLUCOSE BLD-MCNC: 63 MG/DL (ref 70–99)
GLUCOSE BLD-MCNC: 82 MG/DL (ref 70–99)
GLUCOSE SERPL-MCNC: 53 MG/DL (ref 70–99)
HCT VFR BLD AUTO: 24.4 % (ref 40.5–52.5)
HDLC SERPL-MCNC: 41 MG/DL (ref 40–60)
HGB BLD-MCNC: 8.3 G/DL (ref 13.5–17.5)
INR PPP: 1.98 (ref 0.84–1.16)
LDLC SERPL CALC-MCNC: 37 MG/DL
MCH RBC QN AUTO: 35.7 PG (ref 26–34)
MCHC RBC AUTO-ENTMCNC: 34.1 G/DL (ref 31–36)
MCV RBC AUTO: 104.6 FL (ref 80–100)
PERFORMED ON: ABNORMAL
PERFORMED ON: ABNORMAL
PERFORMED ON: NORMAL
PLATELET # BLD AUTO: 205 K/UL (ref 135–450)
PMV BLD AUTO: 9.1 FL (ref 5–10.5)
POTASSIUM SERPL-SCNC: 4.3 MMOL/L (ref 3.5–5.1)
PROTHROMBIN TIME: 22.4 SEC (ref 11.5–14.8)
RBC # BLD AUTO: 2.33 M/UL (ref 4.2–5.9)
SODIUM SERPL-SCNC: 133 MMOL/L (ref 136–145)
TRIGL SERPL-MCNC: 115 MG/DL (ref 0–150)
VLDLC SERPL CALC-MCNC: 23 MG/DL
WBC # BLD AUTO: 6 K/UL (ref 4–11)

## 2023-09-05 PROCEDURE — 80048 BASIC METABOLIC PNL TOTAL CA: CPT

## 2023-09-05 PROCEDURE — 2060000000 HC ICU INTERMEDIATE R&B

## 2023-09-05 PROCEDURE — 71045 X-RAY EXAM CHEST 1 VIEW: CPT

## 2023-09-05 PROCEDURE — 70551 MRI BRAIN STEM W/O DYE: CPT

## 2023-09-05 PROCEDURE — 6370000000 HC RX 637 (ALT 250 FOR IP): Performed by: INTERNAL MEDICINE

## 2023-09-05 PROCEDURE — 36415 COLL VENOUS BLD VENIPUNCTURE: CPT

## 2023-09-05 PROCEDURE — 6370000000 HC RX 637 (ALT 250 FOR IP): Performed by: HOSPITALIST

## 2023-09-05 PROCEDURE — 6370000000 HC RX 637 (ALT 250 FOR IP): Performed by: PSYCHIATRY & NEUROLOGY

## 2023-09-05 PROCEDURE — 94760 N-INVAS EAR/PLS OXIMETRY 1: CPT

## 2023-09-05 PROCEDURE — 97530 THERAPEUTIC ACTIVITIES: CPT

## 2023-09-05 PROCEDURE — 83036 HEMOGLOBIN GLYCOSYLATED A1C: CPT

## 2023-09-05 PROCEDURE — 85610 PROTHROMBIN TIME: CPT

## 2023-09-05 PROCEDURE — 92526 ORAL FUNCTION THERAPY: CPT

## 2023-09-05 PROCEDURE — 2580000003 HC RX 258: Performed by: INTERNAL MEDICINE

## 2023-09-05 PROCEDURE — 97542 WHEELCHAIR MNGMENT TRAINING: CPT

## 2023-09-05 PROCEDURE — 85027 COMPLETE CBC AUTOMATED: CPT

## 2023-09-05 PROCEDURE — 80061 LIPID PANEL: CPT

## 2023-09-05 RX ORDER — CEPHALEXIN 500 MG/1
500 CAPSULE ORAL DAILY
COMMUNITY

## 2023-09-05 RX ORDER — CEPHALEXIN 500 MG/1
500 CAPSULE ORAL DAILY
Status: DISCONTINUED | OUTPATIENT
Start: 2023-09-05 | End: 2023-09-07 | Stop reason: HOSPADM

## 2023-09-05 RX ORDER — DEXTROSE MONOHYDRATE 100 MG/ML
INJECTION, SOLUTION INTRAVENOUS CONTINUOUS PRN
Status: DISCONTINUED | OUTPATIENT
Start: 2023-09-05 | End: 2023-09-07 | Stop reason: HOSPADM

## 2023-09-05 RX ADMIN — ATORVASTATIN CALCIUM 80 MG: 80 TABLET, FILM COATED ORAL at 08:59

## 2023-09-05 RX ADMIN — Medication 1000 UNITS: at 08:58

## 2023-09-05 RX ADMIN — SODIUM CHLORIDE, PRESERVATIVE FREE 10 ML: 5 INJECTION INTRAVENOUS at 20:30

## 2023-09-05 RX ADMIN — WARFARIN SODIUM 4.5 MG: 2.5 TABLET ORAL at 20:39

## 2023-09-05 RX ADMIN — SEVELAMER CARBONATE 800 MG: 800 TABLET, FILM COATED ORAL at 11:32

## 2023-09-05 RX ADMIN — GABAPENTIN 100 MG: 100 CAPSULE ORAL at 08:58

## 2023-09-05 RX ADMIN — LEVETIRACETAM 250 MG: 500 TABLET, FILM COATED ORAL at 08:59

## 2023-09-05 RX ADMIN — SEVELAMER CARBONATE 800 MG: 800 TABLET, FILM COATED ORAL at 08:58

## 2023-09-05 RX ADMIN — CARVEDILOL 25 MG: 25 TABLET, FILM COATED ORAL at 17:25

## 2023-09-05 RX ADMIN — LEVOTHYROXINE SODIUM 100 MCG: 0.1 TABLET ORAL at 05:46

## 2023-09-05 RX ADMIN — SODIUM CHLORIDE, PRESERVATIVE FREE 10 ML: 5 INJECTION INTRAVENOUS at 09:16

## 2023-09-05 RX ADMIN — ASPIRIN 81 MG: 81 TABLET, COATED ORAL at 08:58

## 2023-09-05 RX ADMIN — FOLIC ACID 1 MG: 1 TABLET ORAL at 08:59

## 2023-09-05 RX ADMIN — SACUBITRIL AND VALSARTAN 1 TABLET: 24; 26 TABLET, FILM COATED ORAL at 08:58

## 2023-09-05 RX ADMIN — LEVETIRACETAM 250 MG: 500 TABLET, FILM COATED ORAL at 20:28

## 2023-09-05 RX ADMIN — CEPHALEXIN 500 MG: 500 CAPSULE ORAL at 11:32

## 2023-09-05 RX ADMIN — PANTOPRAZOLE SODIUM 40 MG: 40 TABLET, DELAYED RELEASE ORAL at 05:46

## 2023-09-05 RX ADMIN — SEVELAMER CARBONATE 800 MG: 800 TABLET, FILM COATED ORAL at 17:25

## 2023-09-05 RX ADMIN — CARVEDILOL 25 MG: 25 TABLET, FILM COATED ORAL at 08:58

## 2023-09-05 RX ADMIN — SACUBITRIL AND VALSARTAN 1 TABLET: 24; 26 TABLET, FILM COATED ORAL at 20:28

## 2023-09-05 ASSESSMENT — PAIN SCALES - GENERAL: PAINLEVEL_OUTOF10: 0

## 2023-09-05 NOTE — PROGRESS NOTES
Clinical Pharmacy Note  Warfarin Consult    Ann Saab is a 62 y.o. male receiving warfarin managed by pharmacy. Patient being bridged with none. Warfarin Indication: Hx DVT/PE  Target INR range: 2-3   Dose prior to admission: 3mg qd    Current warfarin drug-drug interactions:     Recent Labs     09/03/23  2201 09/05/23  0543   HGB 9.3* 8.3*   HCT 28.2* 24.4*   INR 2.16* 1.98*         Assessment/Plan:    Warfarin 4.5 mg tonight. Daily PT/INR until stable within therapeutic range. Thank you for the consult. Will continue to follow.      Katarina Barrera, PharmD.  9/5/2023  7:53 AM

## 2023-09-05 NOTE — PROGRESS NOTES
[facility]   Speech Therapy  Daily Dysphagia Treatment Note/DISCHARGE        Josep Butcher  AGE: 62 y.o. GENDER: male  : 1965  [id number]  EPISODE DATE:  9/3/2023  Patient Active Problem List   Diagnosis    GERD (gastroesophageal reflux disease)    Anger    Depression    Thyroid cancer ([address])    S/P kidney transplant    Memory loss    Irritability    Abnormal weight loss    Erectile dysfunction    Decreased libido    Anemia    Folic acid deficiency    Need for SBE (subacute bacterial endocarditis) prophylaxis    Memory loss    Chest pain    Acute kidney injury ([address])    Abdominal pain    Nausea    Diarrhea    Mesenteric ischemia (HCC)    Exertional angina ([facility])    Nonerosive nonspecific gastritis    Gastroparesis    Paraesophageal hiatal hernia    Lower urinary tract symptoms    ESRD (end stage renal disease) on dialysis ([facility])    Hand injury    Toe injury    Fracture of metacarpal neck of right hand, closed    Bilateral low back pain with sciatica    Insomnia    Sty    Essential hypertension    ESRD (end stage renal disease) ([facility])    Hyperkalemia    Weakness of left side of body    Left-sided weakness     No Known Allergies  Treatment Diagnosis: Dysphagia     Chart review:   H&P:   9/3/2023:  Left-sided weakness with findings consistent with acute CVA. May however be due to Joel's paralysis. CT CTA negative. Patient declined TKN. MRI in the a.m. Neurology consultation   New onset seizure. EEG in the a.m., but will defer to neurology. Neurology has been consulted. Keppra 750 mg p.o. twice daily after he received a loading dose in the emergency room. Seizure precautions. End-stage renal disease on hemodialysis: We will consult nephrology as patient is scheduled for dialysis in the a.m. Mariela Whitaker    Thyroid nodule with abnormal thyroid function test: Patient being worked up as an outpatient   Other chronic medical conditions including chronic anemia, pacemaker/defibrillator, status post pacer

## 2023-09-05 NOTE — CARE COORDINATION
Provided to patient . .. by Electronically signed by FLORENTINO Mack on 9/5/2023 at 3:20 PM. Education provided to patient, patient reported no questions and verbalized understanding. Patient aware of 4 hours allotted time to determine if they choose to pursue Medicare appeal process.     09/05/23 1519   IMM Letter   IMM Letter given to Patient/Family/Significant other/Guardian/POA/by: To patient, by Anoop Hurtado LMSW   IMM Letter date given: 09/05/23   IMM Letter time given: [facility], [address] [place] [address] Social Work Case Management   Phone: [phone]  Fax: [phone]

## 2023-09-05 NOTE — PROGRESS NOTES
[facility]                                               Progress note    Summary:   Abbi Fields is being seen by nephrology for ESRD. Admitted with left side weakness. Interval History  Seen at bedside. Awake and alert and sitting up in the chair   MÜHLBACH he developed some tingling in his left arm after dialysis yesterday but has gone away. Had a code stroke called last night. There was no acute stroke on MRI. Dialysis report reviewed from yesterday. 1 L UF. Apparently had rapid called bc he could not swallow and had left numbness, completed almost full treatment. /74  On room air   Na 133 k 4.3 cr 6.6 bicarb 27     Plan:   - no acute dialysis needs. - BP is acceptable and appears euvolemic.   - HD tomorrow per MWF schedule. - add retacrit with HD        River Lockwood MD  [facility]  Office: [phone]    Assessment:   ESRD   On HD MWF with DR Martinez. Has left arm AVF     bP /volume  Acceptable. No acute issues. On coreg 25 mg bID  Entresto 1 tab BID. SHPT  On renvela 800 TID  Vit D 1 k units daily     Anemia  Not at goal.   Add retacrit. ROS:   No SOB  No edema       PMH:   Past medical history, surgical history, social history, family history are reviewed and updated as appropriate. Reviewed current medication list.   Allergies reviewed and updated as needed. PE:   Vitals:    09/05/23 1725   BP: 138/74   Pulse: 70   Resp:    Temp:    SpO2:        General appearance:  in NAD, fully alert and oriented. Comfortable. Respiratory: Respiratory effort appears normal, bilateral equal chest rise, no wheeze, no crackles   Cardiovascular: Ausculation shows RRR no edema  Abdomen: non distended. Skin:no rashes, ulcers, induration, no jaundice. Neuro: face symmetric, no focal deficits. Appropriate responses.      Left AVF + thrill and bruit      Lab Results   Component Value Date    CREATININE 6.6 (HH) 09/05/2023    BUN 36 (H) 09/05/2023    NA

## 2023-09-05 NOTE — PROGRESS NOTES
Order received for CTA of Head/Neck at shift change. Unsure whether patient able to have contrast due to kidney function and dialysis. Perfect serve message sent to confirm. Response received that okay to get CT with the contrast. Patient at baseline as of now, NIH - 0. Patient just returned from CT and moved into specialty bed. Warfarin given late due to patient being in dialysis and rapid response event, other 2 medications held due to patient not having a meal. Marked in [place].

## 2023-09-05 NOTE — FLOWSHEET NOTE
09/05/23 0947   Treatment Team Notification   Reason for Communication Medication concern  (keflex 500mg x7 days (verified with patient's pharmacy) following pacemaker removal - not ordered here)   Name of Team Member Notified Dr. Van Reddy Team Role Attending Provider   Method of Communication Secure Message   Response Waiting for response   Notification Time 7067

## 2023-09-05 NOTE — PROGRESS NOTES
V2.0    [facility] Progress Note      Name:  Tomer Jurado /Age/Sex: 1965  (62 y.o. male)   MRN & CSN:  [id number] & [id number] Encounter Date/Time: 2023 10:19 AM EDT   Location:  T4D-9899/3493-12 PCP: Roger Bosch MD     Attending:Cruz Cano MD       Hospital Day: 3    Assessment and Recommendations   Tomer Jurado is a 62 y.o. male who presents with Weakness of left side of body      Plan:   Left-sided weakness possible acute CVA, with left-sided weakness, Joel's paralysis still in differential neurology consulted and following MRI pending had a stroke code called yesterday CT CTA ordered [facility] neurology consulted, CTA with no evidence of intracranial large vessel occlusion, neurology following, discussed with neurology, will hold Coumadin for now pending MRI  Possibly presyncope and syncope before admission monitor closely certainly seizure episode in differential  Seizures, on Keppra keep for now  End-stage renal disease nephrology consulted on dialysis  Thyroid nodules, needs to follow-up as outpatient for further work-up  Anemia, appears chronic no signs of bleeding at this time  History of pulmonary embolism on Coumadin  Peripheral vascular disease no acute issues at this time  Bilateral above-knee amputation  Right anterior chest wall hematoma Place of ICD      Diet ADULT DIET; Regular; 1000 ml   DVT Prophylaxis [] Lovenox, []  Heparin, [] SCDs, [] Ambulation,  [] Eliquis, [] Xarelto  [] Coumadin   Code Status Full Code             Personally reviewed Lab Studies and Imaging     Discussed management of the case with neurology who recommended hold Coumadin    Telemetry strip without ST elevation        Drugs that require monitoring for toxicity include Coumadin and the method of monitoring was hemoglobin    Medical Decision Making:   The following items were considered in medical decision making:  Discussion of patient care with other providers  Reviewed clinical lab tests  Reviewed radiology

## 2023-09-05 NOTE — PROGRESS NOTES
Progress Note    Updates  Says he feels well today. Episode during HD last evening noted.       Active Ambulatory Problems     Diagnosis Date Noted    GERD (gastroesophageal reflux disease)     Anger     Depression     Thyroid cancer ([address]) 06/25/2012    S/P kidney transplant 06/25/2012    Memory loss 06/25/2012    Irritability 06/25/2012    Abnormal weight loss 06/25/2012    Erectile dysfunction 06/25/2012    Decreased libido 06/25/2012    Anemia 43/87/6200    Folic acid deficiency 67/01/5646    Need for SBE (subacute bacterial endocarditis) prophylaxis 06/25/2012    Memory loss 07/02/2012    Chest pain 03/08/2013    Acute kidney injury ([address]) 03/08/2013    Abdominal pain 06/07/2013    Nausea 06/07/2013    Diarrhea 06/07/2013    Mesenteric ischemia ([address]) 10/01/2013    Exertional angina ([address]) 10/01/2013    Nonerosive nonspecific gastritis 10/17/2013    Gastroparesis 10/17/2013    Paraesophageal hiatal hernia 10/17/2013    Lower urinary tract symptoms 08/05/2014    ESRD (end stage renal disease) on dialysis ([address]) 08/05/2014    Hand injury 03/16/2015    Toe injury 03/16/2015    Fracture of metacarpal neck of right hand, closed 03/24/2015    Bilateral low back pain with sciatica 02/24/2016    Insomnia 02/24/2016    Sty 04/04/2016    Essential hypertension 04/26/2016    ESRD (end stage renal disease) ([address]) 11/17/2021    Hyperkalemia 12/26/2021     Past Medical History:   Diagnosis Date    Buerger's disease ([address])     CAD (coronary artery disease)     CHF (congestive heart failure) (HCC)     Chronic kidney disease, stage V ([facility])     DJD (degenerative joint disease) of cervical spine 1/19/11    DJD (degenerative joint disease), lumbosacral 1/19/11    ESRD     Fecal incontinence     Fracture     Gastritis 8/24/10    H/O mitral valve replacement     Hemodialysis patient ([address])     Hypertension     Hypothyroidism     Iron deficiency anemia     Keloids     Kidney transplants     MI (myocardial infarction) ([address])     Sigmoid

## 2023-09-05 NOTE — PROGRESS NOTES
Nutrition Assessment     Type and Reason for Visit: Initial, Wound    Nutrition Recommendations/Plan:   Continue on regular 1000 ml diet  Monitor intake     Malnutrition Assessment:  Malnutrition Status: No malnutrition    Nutrition Assessment:  Pt admitted with possible CVA and PMH of ESRD on HD, PVD, and bilateral AKA. Pt was seen for wound to coccyx. Pt states it was a stage 4 over a year ago after being in ICU and not being turned enough. It is now only a scab. Pt reports good protein intake and general good nutrition. E  Nutrition Related Findings:   no edema, BM 9/4, GLU 53, Na 133 Wound Type: Pressure Injury    Current Nutrition Therapies:    ADULT DIET;  Regular; 1000 ml    Anthropometric Measures:  Height: 6' (182.9 cm)  Current Body Wt: 138 lb 10.7 oz (62.9 kg)   BMI: 18.8    Nutrition Diagnosis:   No nutrition diagnosis at this time related to   as evidenced by      Nutrition Interventions:   Food and/or Nutrient Delivery: Continue Current Diet  Nutrition Education/Counseling: No recommendation at this time  Coordination of Nutrition Care: Continue to monitor while inpatient       Goals:     Goals: Meet at least 75% of estimated needs, by next RD assessment       Nutrition Monitoring and Evaluation:   Behavioral-Environmental Outcomes: None Identified     Physical Signs/Symptoms Outcomes: Biochemical Data, Nutrition Focused Physical Findings    Discharge Planning:    No discharge needs at this time     Mikel French, [address] [facility] [place], [place],   Contact: [phone]

## 2023-09-05 NOTE — PROGRESS NOTES
Physical Therapy  Facility/Department: [facility]  Physical Therapy Treatment session     Name: Naveen Perez  : 1965  MRN: [id number]  Date of Service: 2023    Discharge Recommendations:  Home with assist PRN, No therapy recommended at discharge   PT Equipment Recommendations  Equipment Needed: No    Naveen Perez scored a 14/24 on the AM-PAC short mobility form. At this time, no further PT is recommended upon discharge due to appears to be at baseline function. Recommend patient returns to prior setting with prior services. This note also serves as a D/C Summary in the event that this patient is discharged prior to the next therapy session. Patient Diagnosis(es): There were no encounter diagnoses. Past Medical History:  has a past medical history of Abnormal weight loss, Abnormal weight loss, Anemia, Anger, Buerger's disease ([address]), CAD (coronary artery disease), CHF (congestive heart failure) ([address]), Chronic kidney disease, stage IV (severe) ([address]), Chronic kidney disease, stage V ([address]), Decreased libido, Depression, DJD (degenerative joint disease) of cervical spine, DJD (degenerative joint disease), lumbosacral, Erectile dysfunction, ESRD, ESRD (end stage renal disease) on dialysis ([address]), Essential hypertension, Fecal incontinence, Folic acid deficiency, Fracture, Gastritis, Gastroparesis, GERD (gastroesophageal reflux disease), H/O mitral valve replacement, Hemodialysis patient ([address]), Hypertension, Hypothyroidism, Iron deficiency anemia, Irritability, Keloids, Kidney transplants, Lower urinary tract symptoms, Memory loss, MI (myocardial infarction) ([address]), Nonerosive nonspecific gastritis, Paraesophageal hiatal hernia, S/P kidney transplant, Sigmoid polyp, Thyroid cancer ([address]), and Thyroid nodules. Past Surgical History:  has a past surgical history that includes Kidney transplant (); Mouth surgery (); Anus surgery ();  Thyroidectomy (2009); Unable to assess  Scooting: Unable to assess  Bed Mobility Comments: OOB throughout treatment session  Transfers  Sit to Stand: Unable to assess  Stand to Sit: Unable to assess  Bed to Chair: Stand by assistance;Contact guard assistance (after set up each attempt increased time, able to perform lateral scoot transfer recliner <-> wheelchair increased effort and time.)  Wheelchair Activities  Wheelchair Type: Standard  Wheelchair Cushion: Pressure Relieving  Pressure Relief Type: Push Up (performed X 2 push ups with set up cues for brake management and safety with SBA)  Level of Assistance for pressure relief activities: Supervision  Wheelchair Parts Management: Yes  Right Armrest Level of Assistance: Supervision (flip back arm rest X 2 trials)  Left Brakes Level of Assistance: Supervision  Right Brakes Level of Assistance: Supervision  Propulsion: Yes  Propulsion 1  Propulsion: Manual  Level: Level Tile  Method: RUE;LUE  Level of Assistance: Stand by assistance;Supervision  Description/ Details: pt with mulitple episodes of lightly making contact of right rim of wheel throughout hallway cues to attend to objects on the right, quick impulisve movements throughout  Distance: 200'; 450', 300'     Balance  Posture: Good  Sitting - Static: Good  Sitting - Dynamic: Good             AM-PAC Score  AM-PAC Inpatient Mobility Raw Score : 14 (09/05/23 1207)  AM-PAC Inpatient T-Scale Score : 38.1 (09/05/23 1207)  Mobility Inpatient CMS 0-100% Score: 61.29 (09/05/23 1207)  Mobility Inpatient CMS G-Code Modifier : CL (09/05/23 1207)              Goals  Short Term Goals  Time Frame for Short Term Goals: upon d/c (all ongoing 9/5/23)  Short Term Goal 1: bed mobility mod I  Short Term Goal 2: transfers mod I  Short Term Goal 3: w/c propulsion 50' mod I  Patient Goals   Patient Goals : \"to go home\"       Education  Patient Education  Education Given To: Patient  Education Provided: Role of Therapy;Plan of Care  Education Method:

## 2023-09-05 NOTE — CARE COORDINATION
Wound care aware of consult. Attempted to see patient. Pt currently working with PT. Will attempt to see patient at a later time.    Electronically signed by Broderick Hannah RN CWOCN on 9/5/2023 at 1:16 PM

## 2023-09-06 LAB
ANION GAP SERPL CALCULATED.3IONS-SCNC: 13 MMOL/L (ref 3–16)
BUN SERPL-MCNC: 44 MG/DL (ref 7–20)
CALCIUM SERPL-MCNC: 8.7 MG/DL (ref 8.3–10.6)
CHLORIDE SERPL-SCNC: 94 MMOL/L (ref 99–110)
CO2 SERPL-SCNC: 26 MMOL/L (ref 21–32)
CREAT SERPL-MCNC: 8.5 MG/DL (ref 0.9–1.3)
DEPRECATED RDW RBC AUTO: 17.4 % (ref 12.4–15.4)
EST. AVERAGE GLUCOSE BLD GHB EST-MCNC: 111.2 MG/DL
GFR SERPLBLD CREATININE-BSD FMLA CKD-EPI: 7 ML/MIN/{1.73_M2}
GLUCOSE SERPL-MCNC: 64 MG/DL (ref 70–99)
HBA1C MFR BLD: 5.5 %
HCT VFR BLD AUTO: 23.6 % (ref 40.5–52.5)
HGB BLD-MCNC: 7.9 G/DL (ref 13.5–17.5)
INR PPP: 1.98 (ref 0.84–1.16)
MCH RBC QN AUTO: 35.4 PG (ref 26–34)
MCHC RBC AUTO-ENTMCNC: 33.6 G/DL (ref 31–36)
MCV RBC AUTO: 105.3 FL (ref 80–100)
PLATELET # BLD AUTO: 180 K/UL (ref 135–450)
PMV BLD AUTO: 9.3 FL (ref 5–10.5)
POTASSIUM SERPL-SCNC: 4.9 MMOL/L (ref 3.5–5.1)
PROTHROMBIN TIME: 22.4 SEC (ref 11.5–14.8)
RBC # BLD AUTO: 2.24 M/UL (ref 4.2–5.9)
SODIUM SERPL-SCNC: 133 MMOL/L (ref 136–145)
WBC # BLD AUTO: 6.4 K/UL (ref 4–11)

## 2023-09-06 PROCEDURE — 6370000000 HC RX 637 (ALT 250 FOR IP): Performed by: HOSPITALIST

## 2023-09-06 PROCEDURE — 85027 COMPLETE CBC AUTOMATED: CPT

## 2023-09-06 PROCEDURE — 2580000003 HC RX 258: Performed by: INTERNAL MEDICINE

## 2023-09-06 PROCEDURE — 6370000000 HC RX 637 (ALT 250 FOR IP): Performed by: PSYCHIATRY & NEUROLOGY

## 2023-09-06 PROCEDURE — 36415 COLL VENOUS BLD VENIPUNCTURE: CPT

## 2023-09-06 PROCEDURE — 6360000002 HC RX W HCPCS: Performed by: INTERNAL MEDICINE

## 2023-09-06 PROCEDURE — 85610 PROTHROMBIN TIME: CPT

## 2023-09-06 PROCEDURE — 97530 THERAPEUTIC ACTIVITIES: CPT

## 2023-09-06 PROCEDURE — 80048 BASIC METABOLIC PNL TOTAL CA: CPT

## 2023-09-06 PROCEDURE — 90935 HEMODIALYSIS ONE EVALUATION: CPT

## 2023-09-06 PROCEDURE — 6370000000 HC RX 637 (ALT 250 FOR IP): Performed by: INTERNAL MEDICINE

## 2023-09-06 PROCEDURE — 94760 N-INVAS EAR/PLS OXIMETRY 1: CPT

## 2023-09-06 PROCEDURE — 2060000000 HC ICU INTERMEDIATE R&B

## 2023-09-06 RX ADMIN — CARVEDILOL 25 MG: 25 TABLET, FILM COATED ORAL at 17:40

## 2023-09-06 RX ADMIN — ASPIRIN 81 MG: 81 TABLET, COATED ORAL at 12:59

## 2023-09-06 RX ADMIN — LEVETIRACETAM 250 MG: 500 TABLET, FILM COATED ORAL at 20:44

## 2023-09-06 RX ADMIN — LEVETIRACETAM 250 MG: 500 TABLET, FILM COATED ORAL at 12:59

## 2023-09-06 RX ADMIN — ATORVASTATIN CALCIUM 80 MG: 80 TABLET, FILM COATED ORAL at 13:00

## 2023-09-06 RX ADMIN — SACUBITRIL AND VALSARTAN 1 TABLET: 24; 26 TABLET, FILM COATED ORAL at 13:00

## 2023-09-06 RX ADMIN — PANTOPRAZOLE SODIUM 40 MG: 40 TABLET, DELAYED RELEASE ORAL at 05:12

## 2023-09-06 RX ADMIN — WARFARIN SODIUM 4.5 MG: 2.5 TABLET ORAL at 17:40

## 2023-09-06 RX ADMIN — CARVEDILOL 25 MG: 25 TABLET, FILM COATED ORAL at 13:00

## 2023-09-06 RX ADMIN — SEVELAMER CARBONATE 800 MG: 800 TABLET, FILM COATED ORAL at 17:40

## 2023-09-06 RX ADMIN — CEPHALEXIN 500 MG: 500 CAPSULE ORAL at 17:40

## 2023-09-06 RX ADMIN — GABAPENTIN 100 MG: 100 CAPSULE ORAL at 12:59

## 2023-09-06 RX ADMIN — LEVOTHYROXINE SODIUM 100 MCG: 0.1 TABLET ORAL at 05:12

## 2023-09-06 RX ADMIN — Medication 1000 UNITS: at 12:59

## 2023-09-06 RX ADMIN — SODIUM CHLORIDE, PRESERVATIVE FREE 10 ML: 5 INJECTION INTRAVENOUS at 20:50

## 2023-09-06 RX ADMIN — SEVELAMER CARBONATE 800 MG: 800 TABLET, FILM COATED ORAL at 13:00

## 2023-09-06 RX ADMIN — FOLIC ACID 1 MG: 1 TABLET ORAL at 12:59

## 2023-09-06 RX ADMIN — SODIUM CHLORIDE, PRESERVATIVE FREE 10 ML: 5 INJECTION INTRAVENOUS at 13:00

## 2023-09-06 RX ADMIN — EPOETIN ALFA-EPBX 4000 UNITS: 4000 INJECTION, SOLUTION INTRAVENOUS; SUBCUTANEOUS at 12:00

## 2023-09-06 RX ADMIN — SACUBITRIL AND VALSARTAN 1 TABLET: 24; 26 TABLET, FILM COATED ORAL at 20:44

## 2023-09-06 ASSESSMENT — PAIN SCALES - GENERAL
PAINLEVEL_OUTOF10: 0

## 2023-09-06 NOTE — ED PROVIDER NOTES
[facility]    CHIEF COMPLAINT  Stroke (Presents EMS. Left face droop and Left arm weakness. ...improving. Last known well at 2100hrs tonight. Hx of CAD and CVA. + thinners. 6 days ago had pacemaker removed at [facility]. Event began approximately 2100 hrs. )       Evelia العراقي is a 62 y.o. male presenting to the ED for strokelike symptoms. Last well-known was 9 PM.  EMS EMS at bedside EMS at bedside and provided report and history. EMS reports that patient's last well-known was 9 PM.  EMS states when they arrived to scene patient patient had loss of sensation in left side of face, left left arm. EMS also noted left weakness in arm. EMS also noted left sided facial droop and slurred speech. Wife is the person who called 911 and is on hear way to the hospital. EMS state POC glucose wnl in transit. Patient symptoms improved significantly in route. Patient now only reports decrease sensation on left side of face and LUE. Patient does have a pmh or stroke, ESRD on HD MWF, and B/L AKA. Patient takes warfarin, last dose this morning. Later wife arrived to emergency department and gave her account of story. Wife states patient was accidentally stuck in the garage for 20 minutes. Wife states the temperature garage was hot. After patient was able to make it back into the house he fell out of his wheelchair. Wife states that she heard patient fall and went to his aid and noticed that he was convulsing. Wife states he was shaking/jerking on his left side and had increased saliva coming mouth. At that moment wife called 46. Wife states this the sole of jerking lasted only 1 minute and then resolved. Afterwards patient was confused and had slurred speech. Patient has no history of epilepsy.     - History obtained from: Patient, wife, and EMS  - Limitations to history: None    I have reviewed the following from the nursing documentation:    Past Medical History: follow up with:   No follow-up provider specified. All questions were answered and the patient/family expressed understanding and agreement with the plan. PROCEDURES   None      CRITICAL CARE  I personally saw the patient and independently provided 30 minutes of non-concurrent critical care out of the total shared critical care time provided. This excludes seperately billable procedures. Critical care time was provided for seizure and stroke symptoms that required close evaluation and/or intervention with concern for potential patient decompensation. For further details of the patient's emergency department visit, please see the advanced practice provider's documentation. CLINICAL IMPRESSION  1. Stroke-like symptom    2. Seizure [facility])          DISPOSITION    Admitted 09/04/2023 12:41:05 AM     Tova Thakkar MD      Note: This chart was created using voice recognition dictation software. Efforts were made by me to ensure accuracy, however some errors may be present due to limitations of this technology and occasionally words are not transcribed correctly.       Tova Thakkar MD  09/06/23 3361

## 2023-09-06 NOTE — CARE COORDINATION
TONSILLECTOMY      TRANSURETHRAL RESECTION OF PROSTATE  2014    Dr. Corona Núñez  2012    Dr. Renee Finley  2013    Dr. Yisel Jeffers  2014    Dr. Nolan pandey       FAMILY HISTORY    Family History   Problem Relation Age of Onset    Uterine Cancer Mother     Seizures Mother     Cervical Cancer Mother     Cancer Mother         cervical cancer    Lung Cancer Father     Mental Retardation Sister     Mental Retardation Brother     Kidney Disease Daughter         renal tubular acidosis as a child       SOCIAL HISTORY    Social History     Tobacco Use    Smoking status: Former     Types: Cigarettes     Quit date: 2015     Years since quittin.5    Smokeless tobacco: Never    Tobacco comments:     3-cigarettes monthly for 2 yrs   Vaping Use    Vaping Use: Never used   Substance Use Topics    Alcohol use: Yes     Comment: rare social    Drug use: No       ALLERGIES    No Known Allergies    MEDICATIONS    No current facility-administered medications on file prior to encounter.      Current Outpatient Medications on File Prior to Encounter   Medication Sig Dispense Refill    cephALEXin (KEFLEX) 500 MG capsule Take 1 capsule by mouth daily X1 week following pacemaker removal      vitamin B-12 (CYANOCOBALAMIN) 1000 MCG tablet Take 1 tablet by mouth daily      baclofen (LIORESAL) 10 MG tablet Take 1 tablet by mouth 3 times daily as needed      carvedilol (COREG) 25 MG tablet Take 1 tablet by mouth 2 times daily (with meals)      levothyroxine (SYNTHROID) 112 MCG tablet Take 1 tablet by mouth Daily Take with 100 mcg tablet (212 mcg total)      warfarin (COUMADIN) 1 MG tablet Take 3 tablets by mouth daily      sacubitril-valsartan (ENTRESTO) 24-26 MG per tablet Take 1 tablet by mouth 2 times daily      folic consult:  Yes    Discharge Plan:  Placement for patient upon discharge: home with support    Patient appropriate for Outpatient [address]: No    Referrals:  []   [] [facility]  [] Supplies  [] Other    Patient/Caregiver Teaching:  Level of patient/caregiver understanding able to:   [] Indicates understanding       [] Needs reinforcement  [] Unsuccessful      [x] Verbal Understanding  [] Demonstrated understanding       [] No evidence of learning  [] Refused teaching         [] N/A       Electronically signed by Lamine Miranda RN, CWOCN on 9/6/2023 at 3:30 PM

## 2023-09-06 NOTE — CONSULTS
Session ID: [id number]  Language: [facility]   ID: #[id number]   Name: Ashley Tavares

## 2023-09-06 NOTE — PROGRESS NOTES
Progress Note    Updates  Doing well. Has no specific neurologic complaints.       Active Ambulatory Problems     Diagnosis Date Noted    GERD (gastroesophageal reflux disease)     Anger     Depression     Thyroid cancer ([address]) 06/25/2012    S/P kidney transplant 06/25/2012    Memory loss 06/25/2012    Irritability 06/25/2012    Abnormal weight loss 06/25/2012    Erectile dysfunction 06/25/2012    Decreased libido 06/25/2012    Anemia 09/59/3988    Folic acid deficiency 78/23/7010    Need for SBE (subacute bacterial endocarditis) prophylaxis 06/25/2012    Memory loss 07/02/2012    Chest pain 03/08/2013    Acute kidney injury ([address]) 03/08/2013    Abdominal pain 06/07/2013    Nausea 06/07/2013    Diarrhea 06/07/2013    Mesenteric ischemia ([address]) 10/01/2013    Exertional angina ([address]) 10/01/2013    Nonerosive nonspecific gastritis 10/17/2013    Gastroparesis 10/17/2013    Paraesophageal hiatal hernia 10/17/2013    Lower urinary tract symptoms 08/05/2014    ESRD (end stage renal disease) on dialysis ([address]) 08/05/2014    Hand injury 03/16/2015    Toe injury 03/16/2015    Fracture of metacarpal neck of right hand, closed 03/24/2015    Bilateral low back pain with sciatica 02/24/2016    Insomnia 02/24/2016    Sty 04/04/2016    Essential hypertension 04/26/2016    ESRD (end stage renal disease) ([address]) 11/17/2021    Hyperkalemia 12/26/2021     Past Medical History:   Diagnosis Date    Buerger's disease ([address])     CAD (coronary artery disease)     CHF (congestive heart failure) (HCC)     Chronic kidney disease, stage V ([facility])     DJD (degenerative joint disease) of cervical spine 1/19/11    DJD (degenerative joint disease), lumbosacral 1/19/11    ESRD     Fecal incontinence     Fracture     Gastritis 8/24/10    H/O mitral valve replacement     Hemodialysis patient ([address])     Hypertension     Hypothyroidism     Iron deficiency anemia     Keloids     Kidney transplants     MI (myocardial infarction) ([address])     Sigmoid polyp 8/24/10 provided for Dr Thu Newman MD

## 2023-09-06 NOTE — PROGRESS NOTES
NAME:  Yu Kumari  YOB: 1965  MEDICAL RECORD NUMBER:  [id number]  TODAYS DATE:  9/6/2023    Discussed personal risk factors for Stroke/TIA with patient/family, and ways to reduce the risk for a recurrent stroke. Patient's personal risk factors which were identified are:     [] Alcohol Abuse: check with your physician before any alcohol consumption. [] Atrial fibrillation: may cause blood clots. [] Drug Abuse: Seek help, talk with your doctor  [] Clotting Disorder  [] Diabetes  [] Family history of stroke or heart disease  [x] High Blood Pressure/Hypertension: work with your physician.  [] High cholesterol: monitor cholesterol levels with your physician.   [] Overweight/Obesity: work with your physician for your ideal body weight.  [] Physical Inactivity: get regular exercise as directed by your physician. [x] Personal history of previous TIA or stroke  [] Poor Diet; decrease salt (sodium) in your diet, follow diet directed by physician. [] Smoking: Cigarette/Cigar: stop smoking. Advised pt. that you can reduce your risk for stroke/TIA by modifying/controlling the risk factors that you have. Pt.advised to take the medications as prescribed, which will be detailed in the discharge instructions, and to not stop taking them without consulting their physician. In addition, pt. advised to maintain a healthy diet, exercise regularly and to not smoke. [facility]'s Stroke treatment and prevention, Managing your recovery  notebook  provided and/or reviewed  with patient/family. The notebook includes, but not limited to, sections addressing warning signs & symptoms of a stroke, which are: sudden numbness or weakness especially on one side of the body, sudden confusion, difficulty speaking or understanding, sudden changes in vision, sudden dizziness or loss of balance/ coordination, sudden severe headache, syncope and seizure.   The need to call EMS (911) immediately if signs & symptoms occur is emphasized . The notebook also provides education on Stroke community resources and stroke advocacy. The need for follow-up after discharge was highlighted with patient/family with them being able to repeat understanding of the importance of this.       Electronically signed by Judy Machado RN on 9/6/2023 at 5:34 PM

## 2023-09-06 NOTE — PROGRESS NOTES
[facility]                                               Progress note    Summary:   Haile Rodriguez is being seen by nephrology for ESRD. Admitted with left side weakness. Interval History  Seen at bedside. No complaints   Had dialysis today, no issues. UF 1200 cc    /84    Plan:   - HD today   - euvolemic  - BP acceptable. - add retacrit with HD  - ok with discharge         Juno Chaudhari MD  [facility]  Office: [phone]    Assessment:   ESRD   On HD MWF with DR Martinez. Has left arm AVF     bP /volume  Acceptable. No acute issues. On coreg 25 mg bID  Entresto 1 tab BID. SHPT  On renvela 800 TID  Vit D 1 k units daily     Anemia  Not at goal.   Add retacrit. ROS:   No SOB  No edema       PMH:   Past medical history, surgical history, social history, family history are reviewed and updated as appropriate. Reviewed current medication list.   Allergies reviewed and updated as needed. PE:   Vitals:    09/06/23 1254   BP: (!) 150/84   Pulse: 83   Resp: 16   Temp:    SpO2:        General appearance:  in NAD, fully alert and oriented. Comfortable. Respiratory: Respiratory effort appears normal, bilateral equal chest rise, no wheeze, no crackles   Cardiovascular: Ausculation shows RRR no edema  Abdomen: non distended. Skin:no rashes, ulcers, induration, no jaundice. Neuro: face symmetric, no focal deficits. Appropriate responses.      Left AVF + thrill and bruit      Lab Results   Component Value Date    CREATININE 8.5 (HH) 09/06/2023    BUN 44 (H) 09/06/2023     (L) 09/06/2023    K 4.9 09/06/2023    CL 94 (L) 09/06/2023    CO2 26 09/06/2023      Lab Results   Component Value Date    WBC 6.4 09/06/2023    HGB 7.9 (L) 09/06/2023    HCT 23.6 (L) 09/06/2023    .3 (H) 09/06/2023     09/06/2023     Lab Results   Component Value Date    .6 (H) 07/21/2016    CALCIUM 8.7 09/06/2023    PHOS 3.8 07/21/2016

## 2023-09-06 NOTE — PROGRESS NOTES
Physical Therapy  Treatment Attempt    23    Name: Chris Rice   : 1965    MRN: [id number]    Patient unable to be seen by PT as patient is currently off the floor for dialysis. Will monitor and check back this date as schedule allows.     Electronically signed by Reyes Blamer, PT on 2023 at 8:32 AM

## 2023-09-06 NOTE — PROGRESS NOTES
RN received call from pt wife who is very disgruntled, expressing frustration that she was not called yesterday to get her permission to complete EEG. RN explained that if pt was A&O and agreeable to procedure, as long as he gives the permission for testing there is no reason for a physician to call her for further permission. Wife stating \"that does not make sense\", reassurance given.     Electronically signed by Tania Loja RN on 9/6/2023 at 5:14 PM

## 2023-09-06 NOTE — PROGRESS NOTES
Occupational Therapy  Facility/Department: [facility] PROGRESSIVE CARE  Occupational Therapy Initial Assessment    Name: Gwenevere Kussmaul  : 1965  MRN: [id number]  Date of Service: 2023    Discharge Recommendations:  Continue to assess pending progress, 2-3 sessions per week, Patient would benefit from continued therapy after discharge  OT Equipment Recommendations  Equipment Needed: No  Gwenevere Kussmaul scored a 19/24 on the [address] [place] [address] form. Current research shows that an AM-PAC score of 18 or greater is typically associated with a discharge to the patient's home setting. Based on the patient's AM-PAC score, and their current ADL deficits, it is recommended that the patient have 2-3 sessions per week of Occupational Therapy at d/c to increase the patient's independence. At this time, this patient demonstrates the endurance and safety to discharge home with OT S1 Level care and a follow up treatment frequency of 2-3x/wk. Please see assessment section for further patient specific details. If patient discharges prior to next session this note will serve as a discharge summary. Please see below for the latest assessment towards goals. Patient Diagnosis(es): The primary encounter diagnosis was Stroke-like symptom. A diagnosis of Seizure [facility]) was also pertinent to this visit.   Past Medical History:  has a past medical history of Abnormal weight loss, Abnormal weight loss, Anemia, Anger, Buerger's disease ([address]), CAD (coronary artery disease), CHF (congestive heart failure) ([address]), Chronic kidney disease, stage IV (severe) ([address]), Chronic kidney disease, stage V ([address]), Decreased libido, Depression, DJD (degenerative joint disease) of cervical spine, DJD (degenerative joint disease), lumbosacral, Erectile dysfunction, ESRD, ESRD (end stage renal disease) on dialysis [facility]), Essential hypertension, Fecal incontinence, Folic acid deficiency, Fracture, Gastritis, Gastroparesis, GERD (gastroesophageal

## 2023-09-06 NOTE — PLAN OF CARE
Problem: Discharge Planning  Goal: Discharge to home or other facility with appropriate resources  Outcome: Progressing     Problem: Skin/Tissue Integrity  Goal: Absence of new skin breakdown  Description: 1. Monitor for areas of redness and/or skin breakdown  2. Assess vascular access sites hourly  3. Every 4-6 hours minimum:  Change oxygen saturation probe site  4. Every 4-6 hours:  If on nasal continuous positive airway pressure, respiratory therapy assess nares and determine need for appliance change or resting period.   Outcome: Progressing     Problem: Safety - Adult  Goal: Free from fall injury  Outcome: Progressing     Problem: ABCDS Injury Assessment  Goal: Absence of physical injury  Outcome: Progressing     Problem: Neurosensory - Adult  Goal: Achieves stable or improved neurological status  Outcome: Progressing  Goal: Absence of seizures  Outcome: Progressing  Goal: Remains free of injury related to seizures activity  Outcome: Progressing  Goal: Achieves maximal functionality and self care  Outcome: Progressing     Problem: Respiratory - Adult  Goal: Achieves optimal ventilation and oxygenation  Outcome: Progressing     Problem: Cardiovascular - Adult  Goal: Maintains optimal cardiac output and hemodynamic stability  Outcome: Progressing     Problem: Skin/Tissue Integrity - Adult  Goal: Incisions, wounds, or drain sites healing without S/S of infection  Outcome: Progressing     Problem: Musculoskeletal - Adult  Goal: Return mobility to safest level of function  Outcome: Progressing  Goal: Return ADL status to a safe level of function  Outcome: Progressing     Problem: Genitourinary - Adult  Goal: Absence of urinary retention  Outcome: Progressing     Problem: Hematologic - Adult  Goal: Maintains hematologic stability  Outcome: Progressing     Problem: Pain  Goal: Verbalizes/displays adequate comfort level or baseline comfort level  Outcome: Progressing     Problem: Chronic Conditions and Co-morbidities  Goal: Patient's chronic conditions and co-morbidity symptoms are monitored and maintained or improved  Outcome: Progressing

## 2023-09-06 NOTE — PROGRESS NOTES
Clinical Pharmacy Note  Warfarin Consult    Gay Wallace is a 62 y.o. male receiving warfarin managed by pharmacy. Patient being bridged with none. Warfarin Indication: Hx DVT/PE  Target INR range: 2-3   Dose prior to admission: 3mg daily    Current warfarin drug-drug interactions: none of significance    Recent Labs     09/03/23  2201 09/05/23  0543 09/06/23  0507   HGB 9.3* 8.3* 7.9*   HCT 28.2* 24.4* 23.6*   INR 2.16* 1.98* 1.98*         Assessment/Plan:    Warfarin 4.5 mg tonight. Daily PT/INR until stable within therapeutic range. Thank you for the consult. Will continue to follow.      Arnold Aguilar, PharmD.  9/6/2023  7:05 AM

## 2023-09-06 NOTE — PROGRESS NOTES
Occupational Therapy  Facility/Department: [address] PROGRESSIVE CARE  Occupational Therapy Initial Assessment  (Note erroneously not completed yesterday, information is from OT tx on 2023)  Name: Harlan Carr  : 1965  MRN: [id number]  Date of Service: 2023    Discharge Recommendations:  Continue to assess pending progress, 2-3 sessions per week, Patient would benefit from continued therapy after discharge  Harlan Carr scored a 18/24 on the [facility] form. Current research shows that an AM-PAC score of 18 or greater is typically associated with a discharge to the patient's home setting. Based on the patient's AM-PAC score, and their current ADL deficits, it is recommended that the patient have 2-3 sessions per week of Occupational Therapy at d/c to increase the patient's independence. At this time, this patient demonstrates the endurance and safety to discharge home with HHOT  to increase the pt's level of independence and a follow up treatment frequency of 2-3x/wk. Please see assessment section for further patient specific details. If patient discharges prior to next session this note will serve as a discharge summary. Please see below for the latest assessment towards goals. Patient Diagnosis(es): There were no encounter diagnoses.   Past Medical History:  has a past medical history of Abnormal weight loss, Abnormal weight loss, Anemia, Anger, Buerger's disease ([address]), CAD (coronary artery disease), CHF (congestive heart failure) ([address]), Chronic kidney disease, stage IV (severe) ([address]), Chronic kidney disease, stage V ([address]), Decreased libido, Depression, DJD (degenerative joint disease) of cervical spine, DJD (degenerative joint disease), lumbosacral, Erectile dysfunction, ESRD, ESRD (end stage renal disease) on dialysis [facility]), Essential hypertension, Fecal incontinence, Folic acid deficiency, Fracture, Gastritis, Gastroparesis, GERD (gastroesophageal reflux disease), H/O mitral valve replacement, Hemodialysis patient ([address]), Hypertension, Hypothyroidism, Iron deficiency anemia, Irritability, Keloids, Kidney transplants, Lower urinary tract symptoms, Memory loss, MI (myocardial infarction) ([address]), Nonerosive nonspecific gastritis, Paraesophageal hiatal hernia, S/P kidney transplant, Sigmoid polyp, Thyroid cancer ([address]), and Thyroid nodules. Past Surgical History:  has a past surgical history that includes Kidney transplant (1991); Mouth surgery (2008); Anus surgery (2000); Thyroidectomy (November 18, 2009); Colonoscopy (August 24, 2010); Upper gastrointestinal endoscopy (March 16, 2012); Kidney transplant (January 16, 2001); Tonsillectomy; fracture surgery; Dialysis fistula creation; Colonoscopy; Colonoscopy; Colonoscopy; Colonoscopy (October 17, 2013); Upper gastrointestinal endoscopy (October 17, 2013); Cystoscopy (February 2014); TURP (April 7, 2014); Upper gastrointestinal endoscopy (March 19, 2014); Dialysis fistula creation (July 2014); Colonoscopy (11/2016); Coronary angioplasty with stent (11/19/20); Cardiac defibrillator placement; Mitral valve repair; vascular surgery; and Colonoscopy (N/A, 3/2/2020). Assessment   Performance deficits / Impairments: Decreased functional mobility ; Decreased ADL status; Decreased endurance;Decreased high-level IADLs;Decreased strength  Assessment: PTA pt from home where pt was Ind with w/c transfers and Ind with ADLs. Today- pt completed fxl transfer to/from recliner and w/c w/ CGA. He then propelled w/c to bathroom w/ SBA-supervision. Pt completed grooming tasks at sink w/ CGA before returning back to recliner. Pt declined the need for any other ADLs this session. Pt will continue to be seen on acute. Anticipate the pt able to return home with progression in therapy.   Prognosis: Good  Assistance / Modification: w/c  Discharge Recommendations: continue to assess pending progress, 2-3 sessions per week, patient would benefit from

## 2023-09-06 NOTE — PROGRESS NOTES
removed 800mls net fluid no complaints no complications tolerated tx well. pre bp 168/90 post bp 143/83 pre weight 65.1kg post weight 63.1kg.  2 bandages to jessy avf  some post tx bleeding two pressure bandages in place. gave epogen as ordered.  bfr 3.5 hours.

## 2023-09-07 VITALS
DIASTOLIC BLOOD PRESSURE: 72 MMHG | SYSTOLIC BLOOD PRESSURE: 147 MMHG | HEART RATE: 68 BPM | OXYGEN SATURATION: 100 % | RESPIRATION RATE: 20 BRPM | WEIGHT: 130.29 LBS | HEIGHT: 72 IN | TEMPERATURE: 98.6 F | BODY MASS INDEX: 17.65 KG/M2

## 2023-09-07 LAB
ANION GAP SERPL CALCULATED.3IONS-SCNC: 11 MMOL/L (ref 3–16)
BUN SERPL-MCNC: 22 MG/DL (ref 7–20)
CALCIUM SERPL-MCNC: 9 MG/DL (ref 8.3–10.6)
CHLORIDE SERPL-SCNC: 99 MMOL/L (ref 99–110)
CO2 SERPL-SCNC: 25 MMOL/L (ref 21–32)
CREAT SERPL-MCNC: 5.9 MG/DL (ref 0.9–1.3)
DEPRECATED RDW RBC AUTO: 17.5 % (ref 12.4–15.4)
GFR SERPLBLD CREATININE-BSD FMLA CKD-EPI: 10 ML/MIN/{1.73_M2}
GLUCOSE BLD-MCNC: 70 MG/DL (ref 70–99)
GLUCOSE BLD-MCNC: 78 MG/DL (ref 70–99)
GLUCOSE SERPL-MCNC: 61 MG/DL (ref 70–99)
HBV SURFACE AB SERPL IA-ACNC: 3.58 MIU/ML
HBV SURFACE AG SERPL QL IA: NORMAL
HCT VFR BLD AUTO: 24.6 % (ref 40.5–52.5)
HGB BLD-MCNC: 8.4 G/DL (ref 13.5–17.5)
INR PPP: 1.96 (ref 0.84–1.16)
MCH RBC QN AUTO: 35.9 PG (ref 26–34)
MCHC RBC AUTO-ENTMCNC: 34.3 G/DL (ref 31–36)
MCV RBC AUTO: 104.8 FL (ref 80–100)
PERFORMED ON: NORMAL
PERFORMED ON: NORMAL
PLATELET # BLD AUTO: 176 K/UL (ref 135–450)
PMV BLD AUTO: 8.9 FL (ref 5–10.5)
POTASSIUM SERPL-SCNC: 4.3 MMOL/L (ref 3.5–5.1)
PROTHROMBIN TIME: 22.3 SEC (ref 11.5–14.8)
RBC # BLD AUTO: 2.35 M/UL (ref 4.2–5.9)
SODIUM SERPL-SCNC: 135 MMOL/L (ref 136–145)
WBC # BLD AUTO: 6.1 K/UL (ref 4–11)

## 2023-09-07 PROCEDURE — 36415 COLL VENOUS BLD VENIPUNCTURE: CPT

## 2023-09-07 PROCEDURE — 94760 N-INVAS EAR/PLS OXIMETRY 1: CPT

## 2023-09-07 PROCEDURE — 85027 COMPLETE CBC AUTOMATED: CPT

## 2023-09-07 PROCEDURE — 2580000003 HC RX 258: Performed by: INTERNAL MEDICINE

## 2023-09-07 PROCEDURE — 80048 BASIC METABOLIC PNL TOTAL CA: CPT

## 2023-09-07 PROCEDURE — 6370000000 HC RX 637 (ALT 250 FOR IP): Performed by: INTERNAL MEDICINE

## 2023-09-07 PROCEDURE — 6370000000 HC RX 637 (ALT 250 FOR IP): Performed by: HOSPITALIST

## 2023-09-07 PROCEDURE — 95819 EEG AWAKE AND ASLEEP: CPT

## 2023-09-07 PROCEDURE — 6370000000 HC RX 637 (ALT 250 FOR IP): Performed by: PSYCHIATRY & NEUROLOGY

## 2023-09-07 PROCEDURE — 9990000010 HC NO CHARGE VISIT: Performed by: PHYSICAL THERAPIST

## 2023-09-07 PROCEDURE — 85610 PROTHROMBIN TIME: CPT

## 2023-09-07 RX ORDER — WARFARIN SODIUM 5 MG/1
5 TABLET ORAL
Status: DISCONTINUED | OUTPATIENT
Start: 2023-09-07 | End: 2023-09-07 | Stop reason: HOSPADM

## 2023-09-07 RX ORDER — LEVETIRACETAM 250 MG/1
250 TABLET ORAL 2 TIMES DAILY
Qty: 60 TABLET | Refills: 0 | Status: SHIPPED | OUTPATIENT
Start: 2023-09-07

## 2023-09-07 RX ADMIN — SACUBITRIL AND VALSARTAN 1 TABLET: 24; 26 TABLET, FILM COATED ORAL at 07:49

## 2023-09-07 RX ADMIN — SEVELAMER CARBONATE 800 MG: 800 TABLET, FILM COATED ORAL at 07:49

## 2023-09-07 RX ADMIN — PANTOPRAZOLE SODIUM 40 MG: 40 TABLET, DELAYED RELEASE ORAL at 06:00

## 2023-09-07 RX ADMIN — CARVEDILOL 25 MG: 25 TABLET, FILM COATED ORAL at 07:50

## 2023-09-07 RX ADMIN — ASPIRIN 81 MG: 81 TABLET, COATED ORAL at 07:50

## 2023-09-07 RX ADMIN — SEVELAMER CARBONATE 800 MG: 800 TABLET, FILM COATED ORAL at 11:47

## 2023-09-07 RX ADMIN — FOLIC ACID 1 MG: 1 TABLET ORAL at 07:50

## 2023-09-07 RX ADMIN — Medication 1000 UNITS: at 07:49

## 2023-09-07 RX ADMIN — LEVETIRACETAM 250 MG: 500 TABLET, FILM COATED ORAL at 07:49

## 2023-09-07 RX ADMIN — ATORVASTATIN CALCIUM 80 MG: 80 TABLET, FILM COATED ORAL at 07:49

## 2023-09-07 RX ADMIN — GABAPENTIN 100 MG: 100 CAPSULE ORAL at 07:49

## 2023-09-07 RX ADMIN — SODIUM CHLORIDE, PRESERVATIVE FREE 10 ML: 5 INJECTION INTRAVENOUS at 07:50

## 2023-09-07 RX ADMIN — LEVOTHYROXINE SODIUM 100 MCG: 0.1 TABLET ORAL at 06:00

## 2023-09-07 ASSESSMENT — PAIN SCALES - GENERAL: PAINLEVEL_OUTOF10: 0

## 2023-09-07 NOTE — PROGRESS NOTES
Physical Therapy      Yu Quoc  [id number]  [id number]    Attempted to see for PT session however pt out of room to EEG; will continue to follow and attempt later as schedule permits  Electronically signed by ZINA LOPEZ PT on 9/7/2023 at 10:50 AM

## 2023-09-07 NOTE — PROCEDURES
[address], [address]                          ELECTROENCEPHALOGRAM REPORT    PATIENT NAME: REAL ANDRADE                      :        1965  MED REC NO:   [id number]                          ROOM:       5107  ACCOUNT NO:   [de-identified]                           ADMIT DATE: 2023  PROVIDER:     Keira Conde DO    DATE OF EE2023    REFERRING PROVIDER:  Burak Martinez NP    REASON FOR STUDY:  Seizures. BRIEF HISTORY AND NEUROLOGIC FINDINGS:  The patient is a 59-year-old  male being evaluated for possible seizures. MEDICATIONS:  The patient's centrally-acting medications listed include  Ativan, Keppra, Synthroid, Lioresal, and Neurontin. EEG FINDING:  This is a 20-channel digital EEG performed utilizing  bipolar and referential montages. Wakefulness, drowsiness, and sleep  were obtained during the recording. During maximum wakefulness, there  was a moderate voltage, symmetric, fairly well-regulated and reactive 8  Hz posterior background rhythm. The anterior background consists of low  voltage fast frequencies. Drowsiness is manifested by attenuation of  waking background rhythms. Sleep was manifested by sleep spindles and  K-complexes. Photic stimulation was performed at various flash frequencies and did  not evoke any significant posterior driving response though he was  asleep throughout most of the photic stimulation exercise. Hyperventilation exercise was not performed due to the patient's age  and/or clinical history. A 1-channel EKG rhythm strip was reviewed and showed no obvious cardiac  dysrhythmias. EEG DIAGNOSIS:  Essentially normal awake and asleep EEG. CLINICAL INTERPRETATION:  No definite epileptiform activity or evidence  for focal cerebral dysfunction was present during this recording.   If  seizures remain a clinical concern, then a repeat EEG may be of

## 2023-09-07 NOTE — PROGRESS NOTES
Discharge orders acknowledged by RN . Discharge teaching completed with pt and family. AVS reviewed and all questions answered. E-scripts sent to pt RX . Follow up appointments also reviewed with pt and resources given for discharge. IV removed. Bedside monitor removed from pt. Pt vitals WNL. Pt discharged with all belongings to home with wife. Pt transported off of unit via wheelchair. No complications.     Electronically signed by Britta Duenas RN on 9/7/2023 at 6:30 PM

## 2023-09-07 NOTE — DISCHARGE INSTR - COC
Continuity of Care Form    Patient Name: Sangeeta Nogueira   :  1965  MRN:  [id number]    Admit date:  9/3/2023  Discharge date:  ***    Code Status Order: Full Code   Advance Directives:     Admitting Physician:  Jaret Phillips MD  PCP: Renee Warner MD    Discharging Nurse: [facility] Unit/Room#: C3S-3839/2698-67  Discharging Unit Phone Number: ***    Emergency Contact:   Extended Emergency Contact Information  Primary Emergency Contact: Ye Eaton  Address: [address] [address] [address] Phone: [phone]  Mobile Phone: [phone]  Relation: Spouse  Secondary Emergency Contact: Kristina Storm  Home Phone: [phone]  Relation: Brother/Sister    Past Surgical History:  Past Surgical History:   Procedure Laterality Date    ANUS SURGERY      sphincterotomy - Dr. Govind Pardo  2010    Dr. Yamileth Jonas  2013    Dr. Bret Jonas  2016    [facility] [place] [facility]    COLONOSCOPY N/A 3/2/2020    COLONOSCOPY POLYPECTOMY SNARE/COLD BIOPSY performed by Rodger Johansen MD at [address] [facility] [address]  20    x`2 stents     CYSTOSCOPY  2014    Dr. Jasbir Young      left arm    DIALYSIS FISTULA CREATION  2014    Dr. Kylah Ocasio      right arm - plate inserted    [address]    mother gave him kidney    KIDNEY TRANSPLANT  2001    MITRAL VALVE REPAIR      MOUTH SURGERY  2008    THYROIDECTOMY  2009    total thyroidectomy done re: papillary carcinoma - Dr. Ashley Marie

## 2023-09-07 NOTE — CARE COORDINATION
Pt is from home with spouse, declined needs. PT receives dialysis through [facility]. Spouse will provide transport.      SW following,     Danielle Fields CHIN, [address] [facility] Social Work Case Management   Phone: [phone]  Fax: [phone]

## 2023-09-07 NOTE — PROGRESS NOTES
Clinical Pharmacy Note  Warfarin Consult    Gemini Palacios is a 62 y.o. male receiving warfarin managed by pharmacy. Patient being bridged with none. Warfarin Indication: Hx DVT/PE  Target INR range: 2-3   Dose prior to admission: 3 mg daily    Current warfarin drug-drug interactions: none of significance    Recent Labs     09/05/23  0543 09/06/23  0507 09/07/23  0518   HGB 8.3* 7.9* 8.4*   HCT 24.4* 23.6* 24.6*   INR 1.98* 1.98* 1.96*         Assessment/Plan:    Warfarin 5 mg tonight. Daily PT/INR until stable within therapeutic range. Thank you for the consult. Will continue to follow.      Gabe Stephens, [address] [facility], PharmD, 9/7/2023 9:14 AM

## 2023-09-07 NOTE — PLAN OF CARE
Problem: Discharge Planning  Goal: Discharge to home or other facility with appropriate resources  Outcome: Progressing  Flowsheets (Taken 9/6/2023 2042 by Brittanie Castrejon, RN)  Discharge to home or other facility with appropriate resources: Identify barriers to discharge with patient and caregiver     Problem: Skin/Tissue Integrity  Goal: Absence of new skin breakdown  Description: 1. Monitor for areas of redness and/or skin breakdown  2. Assess vascular access sites hourly  3. Every 4-6 hours minimum:  Change oxygen saturation probe site  4. Every 4-6 hours:  If on nasal continuous positive airway pressure, respiratory therapy assess nares and determine need for appliance change or resting period. Outcome: Progressing     Problem: Safety - Adult  Goal: Free from fall injury  Outcome: Progressing     Problem: ABCDS Injury Assessment  Goal: Absence of physical injury  Outcome: Progressing     Problem: Neurosensory - Adult  Goal: Achieves stable or improved neurological status  Outcome: Progressing  Flowsheets (Taken 9/6/2023 2042 by Brittanie Castrejon RN)  Achieves stable or improved neurological status: Assess for and report changes in neurological status     Problem: Neurosensory - Adult  Goal: Absence of seizures  Outcome: Progressing  Flowsheets (Taken 9/6/2023 2042 by Brittanie Castrejon RN)  Absence of seizures: Monitor for seizure activity.   If seizure occurs, document type and location of movements and any associated apnea     Problem: Neurosensory - Adult  Goal: Remains free of injury related to seizures activity  Outcome: Progressing  Flowsheets (Taken 9/6/2023 2042 by Brittanie Castrejon RN)  Remains free of injury related to seizure activity: Maintain airway, patient safety  and administer oxygen as ordered     Problem: Neurosensory - Adult  Goal: Achieves maximal functionality and self care  Outcome: Progressing  Flowsheets (Taken 9/6/2023 2042 by Brittanie Castrejon, RN)  Achieves maximal functionality and self care: Monitor swallowing and airway patency with patient fatigue and changes in neurological status     Problem: Pain  Goal: Verbalizes/displays adequate comfort level or baseline comfort level  Outcome: Progressing     Problem: Chronic Conditions and Co-morbidities  Goal: Patient's chronic conditions and co-morbidity symptoms are monitored and maintained or improved  Outcome: Progressing  Flowsheets (Taken 9/6/2023 2042 by Jason Mayorga RN)  Care Plan - Patient's Chronic Conditions and Co-Morbidity Symptoms are Monitored and Maintained or Improved: Monitor and assess patient's chronic conditions and comorbid symptoms for stability, deterioration, or improvement     Problem: Respiratory - Adult  Goal: Achieves optimal ventilation and oxygenation  Outcome: Progressing     Problem: Cardiovascular - Adult  Goal: Maintains optimal cardiac output and hemodynamic stability  Outcome: Progressing  Flowsheets (Taken 9/6/2023 2042 by Jason Mayorga RN)  Maintains optimal cardiac output and hemodynamic stability: Monitor blood pressure and heart rate     Problem: Skin/Tissue Integrity - Adult  Goal: Incisions, wounds, or drain sites healing without S/S of infection  Outcome: Progressing  Flowsheets (Taken 9/6/2023 2042 by Jason Mayorga RN)  Incisions, Wounds, or Drain Sites Healing Without Sign and Symptoms of Infection: ADMISSION and DAILY: Assess and document risk factors for pressure ulcer development     Problem: Musculoskeletal - Adult  Goal: Return mobility to safest level of function  Outcome: Progressing  Flowsheets (Taken 9/6/2023 2042 by Jason Mayorga RN)  Return Mobility to Safest Level of Function: Assess patient stability and activity tolerance for standing, transferring and ambulating with or without assistive devices     Problem: Musculoskeletal - Adult  Goal: Return ADL status to a safe level of function  Outcome: Progressing  Flowsheets (Taken 9/6/2023 2042 by Jason Mayorga

## 2023-09-11 NOTE — PROGRESS NOTES
developed a stage 4 pressure injury. Pt with healing stage 4 to sacrum. Dry   skin. Would recommend sacral border, or blue lotion  Treatment: wound care consult, turn and reposition , low air loss bed, sacral   border change q 3 days. monitor skin per unit guidelines    Thank you, Katelin Miller RN [email or web address]  Options provided:  -- Healing Stage 4 Pressure Ulcer to sacrum  present on admission  -- Other - I will add my own diagnosis  -- Disagree - Not applicable / Not valid  -- Disagree - Clinically unable to determine / Unknown  -- Refer to Clinical Documentation Reviewer    PROVIDER RESPONSE TEXT:    This patient has a healing stage 4 pressure ulcer to sacrum that was present   on admission. Query created by:  Ting Miller on 9/7/2023 1:13 PM      Electronically signed by:  Ranjan Fink MD 9/11/2023 9:27 AM

## 2023-10-09 ENCOUNTER — APPOINTMENT (OUTPATIENT)
Dept: GENERAL RADIOLOGY | Age: 58
DRG: 100 | End: 2023-10-09
Payer: MEDICARE

## 2023-10-09 ENCOUNTER — APPOINTMENT (OUTPATIENT)
Dept: CT IMAGING | Age: 58
DRG: 100 | End: 2023-10-09
Payer: MEDICARE

## 2023-10-09 ENCOUNTER — HOSPITAL ENCOUNTER (INPATIENT)
Age: 58
LOS: 1 days | Discharge: HOME | DRG: 100 | End: 2023-10-10
Attending: EMERGENCY MEDICINE | Admitting: FAMILY MEDICINE
Payer: MEDICARE

## 2023-10-09 DIAGNOSIS — Z78.9 DIFFICULT INTRAVENOUS ACCESS: ICD-10-CM

## 2023-10-09 DIAGNOSIS — E87.5 HYPERKALEMIA: ICD-10-CM

## 2023-10-09 DIAGNOSIS — R56.9 SEIZURE (HCC): Primary | ICD-10-CM

## 2023-10-09 DIAGNOSIS — R79.1 SUBTHERAPEUTIC INTERNATIONAL NORMALIZED RATIO (INR): ICD-10-CM

## 2023-10-09 LAB
ALBUMIN SERPL-MCNC: 3.7 G/DL (ref 3.4–5)
ALBUMIN/GLOB SERPL: 1 {RATIO} (ref 1.1–2.2)
ALP SERPL-CCNC: 239 U/L (ref 40–129)
ALT SERPL-CCNC: 19 U/L (ref 10–40)
ANION GAP SERPL CALCULATED.3IONS-SCNC: 24 MMOL/L (ref 3–16)
AST SERPL-CCNC: 21 U/L (ref 15–37)
BASE EXCESS BLDV CALC-SCNC: -0.2 MMOL/L
BASOPHILS # BLD: 0.1 K/UL (ref 0–0.2)
BASOPHILS NFR BLD: 1.4 %
BILIRUB SERPL-MCNC: 0.4 MG/DL (ref 0–1)
BUN SERPL-MCNC: 61 MG/DL (ref 7–20)
CALCIUM SERPL-MCNC: 9.1 MG/DL (ref 8.3–10.6)
CHLORIDE SERPL-SCNC: 94 MMOL/L (ref 99–110)
CO2 BLDV-SCNC: 28 MMOL/L
CO2 SERPL-SCNC: 18 MMOL/L (ref 21–32)
COHGB MFR BLDV: 2.2 %
CREAT SERPL-MCNC: 9.9 MG/DL (ref 0.9–1.3)
DEPRECATED RDW RBC AUTO: 19 % (ref 12.4–15.4)
EKG ATRIAL RATE: 70 BPM
EKG DIAGNOSIS: NORMAL
EKG P AXIS: 74 DEGREES
EKG P-R INTERVAL: 186 MS
EKG Q-T INTERVAL: 476 MS
EKG QRS DURATION: 178 MS
EKG QTC CALCULATION (BAZETT): 514 MS
EKG R AXIS: 113 DEGREES
EKG T AXIS: 90 DEGREES
EKG VENTRICULAR RATE: 70 BPM
EOSINOPHIL # BLD: 0.7 K/UL (ref 0–0.6)
EOSINOPHIL NFR BLD: 9.7 %
GFR SERPLBLD CREATININE-BSD FMLA CKD-EPI: 6 ML/MIN/{1.73_M2}
GLUCOSE BLD-MCNC: 111 MG/DL (ref 70–99)
GLUCOSE BLD-MCNC: 123 MG/DL (ref 70–99)
GLUCOSE BLD-MCNC: 128 MG/DL (ref 70–99)
GLUCOSE BLD-MCNC: 141 MG/DL (ref 70–99)
GLUCOSE BLD-MCNC: 26 MG/DL (ref 70–99)
GLUCOSE BLD-MCNC: 41 MG/DL (ref 70–99)
GLUCOSE BLD-MCNC: 65 MG/DL (ref 70–99)
GLUCOSE BLD-MCNC: 85 MG/DL (ref 70–99)
GLUCOSE BLD-MCNC: 92 MG/DL (ref 70–99)
GLUCOSE SERPL-MCNC: 94 MG/DL (ref 70–99)
HCO3 BLDV-SCNC: 27 MMOL/L (ref 23–29)
HCT VFR BLD AUTO: 38.9 % (ref 40.5–52.5)
HGB BLD-MCNC: 12.5 G/DL (ref 13.5–17.5)
INR PPP: 1.86 (ref 0.84–1.16)
LEVETIRACETAM SERPL-MCNC: <2 UG/ML (ref 6–46)
LYMPHOCYTES # BLD: 1.7 K/UL (ref 1–5.1)
LYMPHOCYTES NFR BLD: 23.5 %
MCH RBC QN AUTO: 35.4 PG (ref 26–34)
MCHC RBC AUTO-ENTMCNC: 32.1 G/DL (ref 31–36)
MCV RBC AUTO: 110.2 FL (ref 80–100)
MEDICATION DOSE-MCNC: ABNORMAL
METHGB MFR BLDV: 0.3 %
MONOCYTES # BLD: 0.5 K/UL (ref 0–1.3)
MONOCYTES NFR BLD: 6.1 %
MRSA DNA SPEC QL NAA+PROBE: NORMAL
NEUTROPHILS # BLD: 4.4 K/UL (ref 1.7–7.7)
NEUTROPHILS NFR BLD: 59.3 %
O2 THERAPY: ABNORMAL
PATH INTERP BLD-IMP: NO
PCO2 BLDV: 51.9 MMHG (ref 40–50)
PERFORMED ON: ABNORMAL
PERFORMED ON: NORMAL
PERFORMED ON: NORMAL
PH BLDV: 7.32 [PH] (ref 7.35–7.45)
PLATELET # BLD AUTO: 251 K/UL (ref 135–450)
PMV BLD AUTO: 9.5 FL (ref 5–10.5)
PO2 BLDV: 36 MMHG
POTASSIUM SERPL-SCNC: 4.2 MMOL/L (ref 3.5–5.1)
POTASSIUM SERPL-SCNC: 6.5 MMOL/L (ref 3.5–5.1)
POTASSIUM SERPL-SCNC: 6.6 MMOL/L (ref 3.5–5.1)
PROT SERPL-MCNC: 7.3 G/DL (ref 6.4–8.2)
PROTHROMBIN TIME: 21.4 SEC (ref 11.5–14.8)
RBC # BLD AUTO: 3.53 M/UL (ref 4.2–5.9)
REASON FOR REJECTION: NORMAL
REJECTED TEST: NORMAL
SAO2 % BLDV: 57 %
SODIUM SERPL-SCNC: 136 MMOL/L (ref 136–145)
WBC # BLD AUTO: 7.4 K/UL (ref 4–11)

## 2023-10-09 PROCEDURE — G0378 HOSPITAL OBSERVATION PER HR: HCPCS

## 2023-10-09 PROCEDURE — 2580000003 HC RX 258: Performed by: FAMILY MEDICINE

## 2023-10-09 PROCEDURE — 6360000002 HC RX W HCPCS: Performed by: FAMILY MEDICINE

## 2023-10-09 PROCEDURE — 6370000000 HC RX 637 (ALT 250 FOR IP): Performed by: FAMILY MEDICINE

## 2023-10-09 PROCEDURE — 96367 TX/PROPH/DG ADDL SEQ IV INF: CPT

## 2023-10-09 PROCEDURE — 82803 BLOOD GASES ANY COMBINATION: CPT

## 2023-10-09 PROCEDURE — 85025 COMPLETE CBC W/AUTO DIFF WBC: CPT

## 2023-10-09 PROCEDURE — 90935 HEMODIALYSIS ONE EVALUATION: CPT

## 2023-10-09 PROCEDURE — 6370000000 HC RX 637 (ALT 250 FOR IP): Performed by: EMERGENCY MEDICINE

## 2023-10-09 PROCEDURE — 96376 TX/PRO/DX INJ SAME DRUG ADON: CPT

## 2023-10-09 PROCEDURE — 6360000004 HC RX CONTRAST MEDICATION: Performed by: EMERGENCY MEDICINE

## 2023-10-09 PROCEDURE — 96374 THER/PROPH/DIAG INJ IV PUSH: CPT

## 2023-10-09 PROCEDURE — 99285 EMERGENCY DEPT VISIT HI MDM: CPT

## 2023-10-09 PROCEDURE — 94760 N-INVAS EAR/PLS OXIMETRY 1: CPT

## 2023-10-09 PROCEDURE — 85610 PROTHROMBIN TIME: CPT

## 2023-10-09 PROCEDURE — 96372 THER/PROPH/DIAG INJ SC/IM: CPT

## 2023-10-09 PROCEDURE — 36573 INSJ PICC RS&I 5 YR+: CPT

## 2023-10-09 PROCEDURE — 36415 COLL VENOUS BLD VENIPUNCTURE: CPT

## 2023-10-09 PROCEDURE — 84132 ASSAY OF SERUM POTASSIUM: CPT

## 2023-10-09 PROCEDURE — 96365 THER/PROPH/DIAG IV INF INIT: CPT

## 2023-10-09 PROCEDURE — 70498 CT ANGIOGRAPHY NECK: CPT

## 2023-10-09 PROCEDURE — 87340 HEPATITIS B SURFACE AG IA: CPT

## 2023-10-09 PROCEDURE — 2000000000 HC ICU R&B

## 2023-10-09 PROCEDURE — 2580000003 HC RX 258: Performed by: NURSE PRACTITIONER

## 2023-10-09 PROCEDURE — 6360000002 HC RX W HCPCS: Performed by: NURSE PRACTITIONER

## 2023-10-09 PROCEDURE — 86706 HEP B SURFACE ANTIBODY: CPT

## 2023-10-09 PROCEDURE — 99223 1ST HOSP IP/OBS HIGH 75: CPT | Performed by: INTERNAL MEDICINE

## 2023-10-09 PROCEDURE — 87641 MR-STAPH DNA AMP PROBE: CPT

## 2023-10-09 PROCEDURE — 71045 X-RAY EXAM CHEST 1 VIEW: CPT

## 2023-10-09 PROCEDURE — 02HV33Z INSERTION OF INFUSION DEVICE INTO SUPERIOR VENA CAVA, PERCUTANEOUS APPROACH: ICD-10-PCS | Performed by: INTERNAL MEDICINE

## 2023-10-09 PROCEDURE — 93005 ELECTROCARDIOGRAM TRACING: CPT | Performed by: EMERGENCY MEDICINE

## 2023-10-09 PROCEDURE — 70450 CT HEAD/BRAIN W/O DYE: CPT

## 2023-10-09 PROCEDURE — C1751 CATH, INF, PER/CENT/MIDLINE: HCPCS

## 2023-10-09 PROCEDURE — 93010 ELECTROCARDIOGRAM REPORT: CPT | Performed by: INTERNAL MEDICINE

## 2023-10-09 PROCEDURE — 2580000003 HC RX 258: Performed by: EMERGENCY MEDICINE

## 2023-10-09 PROCEDURE — 96375 TX/PRO/DX INJ NEW DRUG ADDON: CPT

## 2023-10-09 PROCEDURE — 6360000002 HC RX W HCPCS: Performed by: EMERGENCY MEDICINE

## 2023-10-09 PROCEDURE — 80053 COMPREHEN METABOLIC PANEL: CPT

## 2023-10-09 PROCEDURE — 80177 DRUG SCRN QUAN LEVETIRACETAM: CPT

## 2023-10-09 PROCEDURE — 5A1D70Z PERFORMANCE OF URINARY FILTRATION, INTERMITTENT, LESS THAN 6 HOURS PER DAY: ICD-10-PCS | Performed by: INTERNAL MEDICINE

## 2023-10-09 PROCEDURE — 76937 US GUIDE VASCULAR ACCESS: CPT

## 2023-10-09 PROCEDURE — 2500000003 HC RX 250 WO HCPCS: Performed by: EMERGENCY MEDICINE

## 2023-10-09 PROCEDURE — 36569 INSJ PICC 5 YR+ W/O IMAGING: CPT

## 2023-10-09 RX ORDER — SODIUM CHLORIDE 9 MG/ML
25 INJECTION, SOLUTION INTRAVENOUS PRN
Status: DISCONTINUED | OUTPATIENT
Start: 2023-10-09 | End: 2023-10-09 | Stop reason: HOSPADM

## 2023-10-09 RX ORDER — ACETAMINOPHEN 650 MG/1
650 SUPPOSITORY RECTAL EVERY 6 HOURS PRN
Status: DISCONTINUED | OUTPATIENT
Start: 2023-10-09 | End: 2023-10-10 | Stop reason: HOSPADM

## 2023-10-09 RX ORDER — ONDANSETRON 2 MG/ML
4 INJECTION INTRAMUSCULAR; INTRAVENOUS EVERY 6 HOURS PRN
Status: DISCONTINUED | OUTPATIENT
Start: 2023-10-09 | End: 2023-10-10 | Stop reason: HOSPADM

## 2023-10-09 RX ORDER — ISOSORBIDE MONONITRATE 30 MG/1
30 TABLET, EXTENDED RELEASE ORAL DAILY
Status: DISCONTINUED | OUTPATIENT
Start: 2023-10-09 | End: 2023-10-10 | Stop reason: HOSPADM

## 2023-10-09 RX ORDER — ONDANSETRON 4 MG/1
4 TABLET, ORALLY DISINTEGRATING ORAL EVERY 8 HOURS PRN
Status: DISCONTINUED | OUTPATIENT
Start: 2023-10-09 | End: 2023-10-10 | Stop reason: HOSPADM

## 2023-10-09 RX ORDER — SODIUM CHLORIDE 9 MG/ML
INJECTION, SOLUTION INTRAVENOUS PRN
Status: DISCONTINUED | OUTPATIENT
Start: 2023-10-09 | End: 2023-10-10 | Stop reason: HOSPADM

## 2023-10-09 RX ORDER — LINEZOLID 2 MG/ML
600 INJECTION, SOLUTION INTRAVENOUS EVERY 12 HOURS
Status: DISCONTINUED | OUTPATIENT
Start: 2023-10-09 | End: 2023-10-10 | Stop reason: HOSPADM

## 2023-10-09 RX ORDER — LEVETIRACETAM 10 MG/ML
1000 INJECTION INTRAVASCULAR EVERY 12 HOURS
Status: DISCONTINUED | OUTPATIENT
Start: 2023-10-09 | End: 2023-10-10

## 2023-10-09 RX ORDER — SODIUM CHLORIDE 0.9 % (FLUSH) 0.9 %
5-40 SYRINGE (ML) INJECTION EVERY 12 HOURS SCHEDULED
Status: DISCONTINUED | OUTPATIENT
Start: 2023-10-09 | End: 2023-10-09 | Stop reason: HOSPADM

## 2023-10-09 RX ORDER — WARFARIN SODIUM 5 MG/1
2.5-5 TABLET ORAL DAILY
COMMUNITY

## 2023-10-09 RX ORDER — ACETAMINOPHEN 325 MG/1
650 TABLET ORAL EVERY 6 HOURS PRN
Status: DISCONTINUED | OUTPATIENT
Start: 2023-10-09 | End: 2023-10-10 | Stop reason: HOSPADM

## 2023-10-09 RX ORDER — LEVETIRACETAM 10 MG/ML
1000 INJECTION INTRAVASCULAR ONCE
Status: COMPLETED | OUTPATIENT
Start: 2023-10-09 | End: 2023-10-09

## 2023-10-09 RX ORDER — CARVEDILOL 25 MG/1
25 TABLET ORAL 2 TIMES DAILY WITH MEALS
Status: DISCONTINUED | OUTPATIENT
Start: 2023-10-09 | End: 2023-10-10 | Stop reason: HOSPADM

## 2023-10-09 RX ORDER — DEXTROSE MONOHYDRATE 100 MG/ML
INJECTION, SOLUTION INTRAVENOUS CONTINUOUS PRN
Status: DISCONTINUED | OUTPATIENT
Start: 2023-10-09 | End: 2023-10-09 | Stop reason: HOSPADM

## 2023-10-09 RX ORDER — CALCIUM CHLORIDE 100 MG/ML
1000 INJECTION INTRAVENOUS; INTRAVENTRICULAR ONCE
Status: DISCONTINUED | OUTPATIENT
Start: 2023-10-09 | End: 2023-10-09

## 2023-10-09 RX ORDER — SODIUM CHLORIDE 0.9 % (FLUSH) 0.9 %
5-40 SYRINGE (ML) INJECTION PRN
Status: DISCONTINUED | OUTPATIENT
Start: 2023-10-09 | End: 2023-10-10 | Stop reason: HOSPADM

## 2023-10-09 RX ORDER — SODIUM CHLORIDE 0.9 % (FLUSH) 0.9 %
5-40 SYRINGE (ML) INJECTION EVERY 12 HOURS SCHEDULED
Status: DISCONTINUED | OUTPATIENT
Start: 2023-10-09 | End: 2023-10-10 | Stop reason: HOSPADM

## 2023-10-09 RX ORDER — WARFARIN SODIUM 5 MG/1
5 TABLET ORAL
Status: COMPLETED | OUTPATIENT
Start: 2023-10-09 | End: 2023-10-09

## 2023-10-09 RX ORDER — METRONIDAZOLE 500 MG/100ML
500 INJECTION, SOLUTION INTRAVENOUS EVERY 8 HOURS
Status: DISCONTINUED | OUTPATIENT
Start: 2023-10-09 | End: 2023-10-09

## 2023-10-09 RX ORDER — SEVELAMER CARBONATE 800 MG/1
1600 TABLET, FILM COATED ORAL
Status: DISCONTINUED | OUTPATIENT
Start: 2023-10-09 | End: 2023-10-10 | Stop reason: HOSPADM

## 2023-10-09 RX ORDER — SODIUM CHLORIDE 0.9 % (FLUSH) 0.9 %
5-40 SYRINGE (ML) INJECTION PRN
Status: DISCONTINUED | OUTPATIENT
Start: 2023-10-09 | End: 2023-10-09 | Stop reason: HOSPADM

## 2023-10-09 RX ORDER — LORAZEPAM 2 MG/ML
1 INJECTION INTRAMUSCULAR ONCE
Status: DISCONTINUED | OUTPATIENT
Start: 2023-10-09 | End: 2023-10-09

## 2023-10-09 RX ORDER — POLYETHYLENE GLYCOL 3350 17 G/17G
17 POWDER, FOR SOLUTION ORAL DAILY PRN
Status: DISCONTINUED | OUTPATIENT
Start: 2023-10-09 | End: 2023-10-10 | Stop reason: HOSPADM

## 2023-10-09 RX ORDER — MIDAZOLAM HYDROCHLORIDE 1 MG/ML
5 INJECTION INTRAMUSCULAR; INTRAVENOUS ONCE
Status: COMPLETED | OUTPATIENT
Start: 2023-10-09 | End: 2023-10-09

## 2023-10-09 RX ORDER — LIDOCAINE HYDROCHLORIDE 10 MG/ML
5 INJECTION, SOLUTION EPIDURAL; INFILTRATION; INTRACAUDAL; PERINEURAL ONCE
Status: DISCONTINUED | OUTPATIENT
Start: 2023-10-09 | End: 2023-10-09 | Stop reason: HOSPADM

## 2023-10-09 RX ADMIN — SODIUM CHLORIDE, PRESERVATIVE FREE 10 ML: 5 INJECTION INTRAVENOUS at 21:10

## 2023-10-09 RX ADMIN — ISOSORBIDE MONONITRATE 30 MG: 30 TABLET, EXTENDED RELEASE ORAL at 17:43

## 2023-10-09 RX ADMIN — LEVETIRACETAM 1000 MG: 10 INJECTION, SOLUTION INTRAVENOUS at 09:30

## 2023-10-09 RX ADMIN — MIDAZOLAM HYDROCHLORIDE 5 MG: 1 INJECTION, SOLUTION INTRAMUSCULAR; INTRAVENOUS at 08:45

## 2023-10-09 RX ADMIN — LINEZOLID 600 MG: 600 INJECTION, SOLUTION INTRAVENOUS at 23:11

## 2023-10-09 RX ADMIN — PIPERACILLIN AND TAZOBACTAM 4500 MG: 4; .5 INJECTION, POWDER, LYOPHILIZED, FOR SOLUTION INTRAVENOUS at 16:58

## 2023-10-09 RX ADMIN — LEVETIRACETAM 1000 MG: 10 INJECTION, SOLUTION INTRAVENOUS at 21:06

## 2023-10-09 RX ADMIN — Medication 16 G: at 18:33

## 2023-10-09 RX ADMIN — SACUBITRIL AND VALSARTAN 1 TABLET: 24; 26 TABLET, FILM COATED ORAL at 21:06

## 2023-10-09 RX ADMIN — ACETAMINOPHEN 650 MG: 325 TABLET ORAL at 21:06

## 2023-10-09 RX ADMIN — INSULIN HUMAN 10 UNITS: 100 INJECTION, SOLUTION PARENTERAL at 14:57

## 2023-10-09 RX ADMIN — CARVEDILOL 25 MG: 25 TABLET, FILM COATED ORAL at 19:28

## 2023-10-09 RX ADMIN — CALCIUM CHLORIDE 1000 MG: 100 INJECTION INTRAVENOUS; INTRAVENTRICULAR at 14:46

## 2023-10-09 RX ADMIN — IOPAMIDOL 75 ML: 755 INJECTION, SOLUTION INTRAVENOUS at 09:44

## 2023-10-09 RX ADMIN — DEXTROSE MONOHYDRATE 250 ML: 100 INJECTION, SOLUTION INTRAVENOUS at 15:01

## 2023-10-09 RX ADMIN — SEVELAMER CARBONATE 1600 MG: 800 TABLET, FILM COATED ORAL at 19:27

## 2023-10-09 RX ADMIN — WARFARIN SODIUM 5 MG: 5 TABLET ORAL at 18:01

## 2023-10-09 ASSESSMENT — PAIN SCALES - GENERAL
PAINLEVEL_OUTOF10: 0
PAINLEVEL_OUTOF10: 3
PAINLEVEL_OUTOF10: 0
PAINLEVEL_OUTOF10: 1
PAINLEVEL_OUTOF10: 3

## 2023-10-09 ASSESSMENT — PAIN DESCRIPTION - LOCATION
LOCATION: BACK
LOCATION: BACK

## 2023-10-09 ASSESSMENT — PAIN DESCRIPTION - ORIENTATION: ORIENTATION: MID

## 2023-10-09 ASSESSMENT — PAIN DESCRIPTION - DESCRIPTORS
DESCRIPTORS: ACHING
DESCRIPTORS: ACHING

## 2023-10-09 ASSESSMENT — PAIN - FUNCTIONAL ASSESSMENT
PAIN_FUNCTIONAL_ASSESSMENT: 0-10
PAIN_FUNCTIONAL_ASSESSMENT: ACTIVITIES ARE NOT PREVENTED

## 2023-10-09 ASSESSMENT — PAIN DESCRIPTION - PAIN TYPE: TYPE: CHRONIC PAIN

## 2023-10-09 NOTE — PROGRESS NOTES
Pt took glucose chewable tablets at 1833. Reassessed at [facility]. POC is 85 now. Pt 58 bpm, 19 RR, and 114/78. Pt is AONx4, c/o of pain in L-amputated knee. Pt states it is jagged and pain protruding into muscle/skin of that knee. Will continue to assess.

## 2023-10-09 NOTE — PROGRESS NOTES
Pt POC dropped back down to 65. Notified MD again. Requested glucose chews. Gave another cup of apple juice while [employer] processed order verification.

## 2023-10-09 NOTE — ED NOTES
per EMS. O2 2L per EMS due to SPO2 89%. SPO2 97% on RA at this time.       Debara Rinne, ELISA  10/09/23 0160

## 2023-10-09 NOTE — PROGRESS NOTES
Pt AONx4, but seems lethargic and in/out. Checked blood sugar at 1702. POC initially 41, redid at 1705 to doublecheck was 26. Notified MD via [employer] immediately after. Gave pt 3 cups of apple juice and ran D10 bolus @ 957 mL/hr.

## 2023-10-09 NOTE — ED TRIAGE NOTES
EMS called to residence for \"seizure like activity\". Pt states his left arm will spasm and \"shake uncontrollably\" no other body movements. Pt hx of CVA with left side deficits. Pacer removed a few weeks ago due to \"lead was fractured\" states no plan to fix. Dialysis patient with bilat LLE amputee. Last dialysis Friday, due today. Wife told EMS he missed a dose of Warfarin on Saturday.
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
## 2023-10-09 NOTE — PROGRESS NOTES
4 Eyes Skin Assessment     NAME:  Bunny Bell  YOB: 1965  MEDICAL RECORD NUMBER:  [id number]    The patient is being assessed for  Admission    I agree that at least one RN has performed a thorough Head to Toe Skin Assessment on the patient. ALL assessment sites listed below have been assessed. Areas assessed by both nurses:    Head, Face, Ears, Shoulders, Back, Chest, Arms, Elbows, Hands, Sacrum. Buttock, Coccyx, Ischium, and Legs. Feet and Heels        Does the Patient have a Wound? Yes wound(s) were present on assessment.  LDA wound assessment was Initiated and completed by RN       Brayan Prevention initiated by RN: Yes  Wound Care Orders initiated by RN: No    Pressure Injury (Stage 3,4, Unstageable, DTI, NWPT, and Complex wounds) if present, place Wound referral order by RN under : Yes    New Ostomies, if present place, Ostomy referral order under : No     Nurse 1 eSignature: Electronically signed by Araceli Aguilar RN on 10/9/23 at 3:50 PM EDT    **SHARE this note so that the co-signing nurse can place an eSignature**    Nurse 2 eSignature: Electronically signed by Faith Ramírez RN on 10/9/23 at 6:41 PM EDT

## 2023-10-09 NOTE — ED NOTES
Called Nephrology [phone] at 1800 [address], [place]  10/09/23 6803

## 2023-10-09 NOTE — PROGRESS NOTES
Upon arrival to place PICC assessed chart for issues related to PICC placement, check for consent, and did time out with ELISA Niño. Upon assessment of right arm and upper chest, mulitple infiltrations present from failed IV's, Hematoma from right sided chest pacemaker, unknown subclavian vein interaction and possible PE if pushing through clot with catheter or wire. Unable to use left arm due to Fistula. Discussed findings with MD Juni Bell. Midline okay for placement at this time. Pt. Tolerated midline placement well, no difficulty accessing brachial vein, threaded well, with free flowing blood return.  Reported off to [employer]

## 2023-10-09 NOTE — CONSULTS
Clinical Pharmacy Note  Warfarin Consult    Ann Saab is a 62 y.o. male receiving warfarin managed by pharmacy. Patient being bridged with none. Warfarin Indication: thrombophilia/Hx of atrial thrombus  Target INR range: 2-3   Dose prior to admission: 5mg daily except 2.5mg on Tuesday and Thursday    Current warfarin drug-drug interactions: NA    Recent Labs     10/09/23  0841 10/09/23  0925   HGB  --  12.5*   HCT  --  38.9*   INR 1.86*  --        Assessment/Plan:    Warfarin 5 mg tonight. Daily PT/INR until stable within therapeutic range. Thank you for the consult. Will continue to follow.

## 2023-10-09 NOTE — ED NOTES
Dr. Ida Walker attempted EJ x2 without success. Patient responds to painful stimuli. Wife at bedside.       Nicholas Leonard RN  10/09/23 0940       Nicholas Leonard RN  10/09/23 3561

## 2023-10-09 NOTE — PROGRESS NOTES
[facility] WILY Scanner Results  Pt was scanned according to 's recommendations. Admission      Site Reading Redness noted? Y/N   Right heel N/A    Left heel  N/A    Sacrum 0.4 N       [x]  WILY Delta score < 0.6 indicates normal, healthy tissue at this time. Continue to assess daily and implement routine pressure injury prevention measures using the Brayan Order Set. Scan weekly on Wednesday. [] WILY Delta score > or = to 0.6 indicates at risk tissue. Implement target prevention interventions below and scan daily.     [x] Brayan orders reviewed and updated if indicated  [x] Educated patient/family on importance of offloading/repositioning  [x] Patient agreeable to plan for pressure offloading/repositioning    Positioning wedge, Extra pillows, and Pre-inflated chair cushion      [] Nutrition consult made  [x] Nutrition consult not indicated at this time     [x] Sacral foam border in place  [] Sacral foam border not in place, barrier cream applied    [] Low air loss mattress (or Isoflex blue/orange mattress) ordered/placed   [] For heels, apply liquid skin barrier twice daily and ensure offloading with pillows or boots

## 2023-10-09 NOTE — PROGRESS NOTES
Medication Reconciliation    List of medications patient is currently taking is complete. Source of information: 1. Conversation with patient's family at bedside                                      2. EPIC records      Allergies  Patient has no known allergies. Notes regarding home medications:   1. Patient did not receive any of his home medications prior to arrival to the ER today. 2. Patient's family indicates patient has NOT been taking Levetiracetam (Keppra) - she is persistent that her medical team has stopped this. 3. Patient is anticoagulated on Warfarin:      [Indication: Thrombophilia / Hx Atrial thrombus, Target INR: 2-3, Current dose: 2.5 mg on TUE/SAT and 5 mg ALL OTHER DAYS] - Patient did not take Warfarin yet today.   4. Patient takes Prednisone 5 mg po three times weekly on MON/WED/FRI    Jeronimo Jackson, PharmD, BCPS  10/9/2023 12:59 PM

## 2023-10-09 NOTE — ED NOTES
Handoff report given to new primary RN Gerda Ahuja ; all questions and concerns answered; receiving RN acknowledged and had no further questions at this time for this RN; Primary Rn to assume care at this point      Akil Friday, RN  10/09/23 6292

## 2023-10-09 NOTE — PROGRESS NOTES
Reassessed pt and rechecked POC. Pt . Pt remains AON and vitals appear stable. Will continue to assess.

## 2023-10-09 NOTE — ED PROVIDER NOTES
appropriately without evidence of any other focal neurologic deficit. There was difficulty placing IV access. IM midazolam ordered given concern for partial seizure and lack of access. Shortly after this was given patient had decline in mental status and appeared to have generalization fully with some postictal altered mental status. We had prepared to intubate for airway protection prior to obtaining head CT. I attempted EJ peripheral IV x2 and ultimately placed a right AC peripheral IV ultrasound-guided. Patient's mental status improved during attempts of vascular access and I felt that he no longer required intubation. Head CT redemonstrated his chronic right MCA infarct but did not show evidence of acute intracranial abnormality. His ultrasound-guided place peripheral IV is no longer working. He is having improvement in mental status and is no longer hypotensive so we will have a PICC line placed. Is concerning for hyperkalemia. I have consulted nephrology. They approved PICC line placement and will facilitate urgent dialysis. Insulin/dextrose/calcium ordered for hyperkalemia management given that he does have some peaked T waves. Potassium was hemolyzed and repeat is pending. Of note, he was initiated on Keppra for seizure. It was renally dosed and I discussed with inpatient pharmacist to discuss dosing. He will require admission. Of note, his INR is subtherapeutic. He is not hypoxic. He is not profoundly tachycardic or hypotensive. He does not have signs or symptoms of DVT on exam.  I do not feel CTPA is currently indicated. PICC team concerned about chest wall hematoma at site of prior pacemaker. Ok with Midline.        Care discussed with hospitalist.  He will require admission.     - History obtained from: patient, wife    - Records reviewed: yes, similar presentation last month        - Consults:   IP CONSULT TO NEPHROLOGY       Is this patient to be included in the SEP-1 Core Measure? No Exclusion criteria - the patient is NOT to be included for SEP-1 Core Measure due to: 2+ SIRS criteria are not met    ILinsey MD, am the primary clinician of record. During the patient's ED course, the patient was given:  Medications   calcium chloride 10 % injection 1,000 mg (has no administration in time range)   lidocaine PF 1 % injection 5 mL (has no administration in time range)   sodium chloride flush 0.9 % injection 5-40 mL (has no administration in time range)   sodium chloride flush 0.9 % injection 5-40 mL (has no administration in time range)   0.9 % sodium chloride infusion (has no administration in time range)   insulin regular (HUMULIN R;NOVOLIN R) injection 10 Units (has no administration in time range)     And   dextrose bolus 10% 250 mL (has no administration in time range)   glucose chewable tablet 16 g (has no administration in time range)   dextrose bolus 10% 125 mL (has no administration in time range)     Or   dextrose bolus 10% 250 mL (has no administration in time range)   glucagon (rDNA) injection 1 mg (has no administration in time range)   dextrose 10 % infusion (has no administration in time range)   levETIRAcetam (KEPPRA) 1000 mg/100 mL IVPB (1,000 mg IntraVENous New Bag 10/9/23 0930)   midazolam (VERSED) injection 5 mg (5 mg IntraMUSCular Given 10/9/23 0845)   iopamidol (ISOVUE-370) 76 % injection 75 mL (75 mLs IntraVENous Given 10/9/23 0944)        Patient was given prescriptions for the following medications. I counseled the patient as to how to take these medications. New Prescriptions    No medications on file       Patient instructed to follow up with:   No follow-up provider specified. All questions were answered and the patient/family expressed understanding and agreement with the plan. PROCEDURES  Right 18G external jugular peripheral IV attempted and was unsuccessful; L 20 G EJ attempted and was unsuccessful.   Patient has scaring over both

## 2023-10-09 NOTE — PROGRESS NOTES
4 Eyes Skin Assessment     NAME:  Liam Garrison  YOB: 1965  MEDICAL RECORD NUMBER:  [id number]    The patient is being assessed for  Shift Handoff    I agree that at least one RN has performed a thorough Head to Toe Skin Assessment on the patient. ALL assessment sites listed below have been assessed. Areas assessed by both nurses:  Head, Face, Ears, Shoulders, Back, Chest, Arms, Elbows, Hands, Sacrum. Buttock, Coccyx, Ischium,           Does the Patient have a Wound? Yes wound(s) were present on assessment.  LDA wound assessment was Initiated and completed by RN       Brayan Prevention initiated by RN: Yes  Wound Care Orders initiated by RN: No    Pressure Injury (Stage 3,4, Unstageable, DTI, NWPT, and Complex wounds) if present, place Wound referral order by RN under : No    New Ostomies, if present place, Ostomy referral order under : No     Nurse 1 eSignature: Electronically signed by Ashish Addison RN on 10/9/23 at 7:07 PM EDT    **SHARE this note so that the co-signing nurse can place an eSignature**    Nurse 2 eSignature: Electronically signed by Shital Qureshi RN on 10/10/23 at 6:51 AM EDT

## 2023-10-09 NOTE — PROGRESS NOTES
Pt transferred from ER to 2125 on RA. Pt is A/Ox4. VSS. Pt oriented to room and on plan of care. Call light in reach.

## 2023-10-09 NOTE — CONSULTS
REASON FOR CONSULTATION/CC: seizure       Consult at request of Esther Rivera MD for      PCP: Qi Hughes MD  Established Pulmonologist:    None    HISTORY OF PRESENT ILLNESS: Sandy Whitmore is a 62y.o. year old male with a history of end-stage renal disease bilateral BKA who presented with seizure-like activity. He was treated with Versed. At this time, patient wakes briefly is states \"I am okay \". Does not open eyes. This note may have been  transcribed using [employer]. Please disregard any translational errors. Assessment:         Plan:      Hospital Day 0     Seizure   Part of this morning but otherwise appears to be back to normal    Abnormal radiograph  Probable left lower lobe aspiration a given retrocardiac opacity. Stop the cefepime with recent seizure. Changed to Zosyn. Can stop Flagyl as well. Prolonged QTc  Monitor. Electrolyte correction with dialysis. End-stage renal disease on dialysis with metabolic acidosis and hyperkalemia  Planning for dialysis later today. This note was transcribed using [employer]. Please disregard any translational errors. Thank you for the consult    [facility]  [phone]             Data:     PAST MEDICAL HISTORY:  Past Medical History:   Diagnosis Date    Abnormal weight loss 6/25/2012    Abnormal weight loss 6/25/2012    Anemia 6/25/2012    Anger     Buerger's disease ([address])     ? ?    CAD (coronary artery disease)     CHF (congestive heart failure) (HCC)     Chronic kidney disease, stage IV (severe) (HCC)     Chronic kidney disease, stage V (HCC)     Decreased libido 6/25/2012    Depression     DJD (degenerative joint disease) of cervical spine 1/19/11    MRI - advanced disc degeneration C3-4,5-6 spinal cord compression, C6-7 disc protusion    DJD (degenerative joint disease), lumbosacral 1/19/11    MRI - disc herniation L4-5, L3,4,5 nerve root findings. Increase in size of suspected sebaceous cyst within the lower  anterior pelvic wall. Correlation with clinical examination is suggested. CTPA: No results found for this or any previous visit. CXR PA/LAT: No results found for this or any previous visit. CXR portable: Results for orders placed during the hospital encounter of 10/09/23    XR CHEST PORTABLE    Narrative  EXAMINATION:  ONE XRAY VIEW OF THE CHEST    10/9/2023 8:43 am    COMPARISON:  CXR dated 09/05/2023    HISTORY:  ORDERING SYSTEM PROVIDED HISTORY: seizure like activity  TECHNOLOGIST PROVIDED HISTORY:  Reason for exam:->seizure like activity  Reason for Exam: seizure like activity    FINDINGS:  Medical devices: Cardiac valve, unchanged. Surgical clips of the right  axilla. Stent in the left axilla. Mediastinum/Heart: The thoracic aorta is tortuous. The heart appears normal  in size. Lungs: Low lung volumes. Opacification of the retrocardiac left lung,  atelectasis versus pneumonia. Pleura: Small left pleural effusion. No pneumothorax. Impression  1. Low lung volumes. Opacification of the retrocardiac left lung,  atelectasis versus pneumonia. 2.  Small left pleural effusion.

## 2023-10-09 NOTE — ED NOTES
Patient becoming more alert and managing airway.       Gisella Marquez RN  10/09/23 [address]       Gisella Marquez RN  10/09/23 2996

## 2023-10-09 NOTE — ED NOTES
Wife (POA) wants to know before any test or procedures are performed.        Yoselin Alcocer RN  10/09/23 0908

## 2023-10-09 NOTE — ED NOTES
Patient to [facility] Manoj Elder, [address] [place] [address]  10/09/23 8778

## 2023-10-09 NOTE — H&P
stent.     XR CHEST PORTABLE    Result Date: 10/9/2023  EXAMINATION: ONE XRAY VIEW OF THE CHEST 10/9/2023 8:43 am COMPARISON: CXR dated 09/05/2023 HISTORY: ORDERING SYSTEM PROVIDED HISTORY: seizure like activity TECHNOLOGIST PROVIDED HISTORY: Reason for exam:->seizure like activity Reason for Exam: seizure like activity FINDINGS: Medical devices: Cardiac valve, unchanged. Surgical clips of the right axilla. Stent in the left axilla. Mediastinum/Heart: The thoracic aorta is tortuous. The heart appears normal in size. Lungs: Low lung volumes. Opacification of the retrocardiac left lung, atelectasis versus pneumonia. Pleura: Small left pleural effusion. No pneumothorax. 1. Low lung volumes. Opacification of the retrocardiac left lung, atelectasis versus pneumonia. 2.  Small left pleural effusion. CT HEAD WO CONTRAST    Result Date: 10/9/2023  EXAMINATION: CT OF THE HEAD WITHOUT CONTRAST  10/9/2023 8:59 am TECHNIQUE: CT of the head was performed without the administration of intravenous contrast. Automated exposure control, iterative reconstruction, and/or weight based adjustment of the mA/kV was utilized to reduce the radiation dose to as low as reasonably achievable. COMPARISON: 09/04/2023 HISTORY: ORDERING SYSTEM PROVIDED HISTORY: partial seizure of LUE TECHNOLOGIST PROVIDED HISTORY: Reason for exam:->partial seizure of LUE Has a \"code stroke\" or \"stroke alert\" been called? ->No Decision Support Exception - unselect if not a suspected or confirmed emergency medical condition->Emergency Medical Condition (MA) Reason for Exam: partial seizure of LUE FINDINGS: BRAIN/VENTRICLES: There is no acute intracranial hemorrhage, mass effect or midline shift. No abnormal extra-axial fluid collection. Remote cortical infarction is present within the right posterior MCA distribution. Small colo Sol lipoma is again incidentally noted. The gray-white differentiation is maintained without evidence of an acute infarct.

## 2023-10-09 NOTE — ED NOTES
Dr. Lynn Granger called to bedside due to neuro decline. Patient drooling and not responding to voice. Patient sweating, cool cloth placed. Oral suction done.       Debara Rinne, RN  10/09/23 9652

## 2023-10-10 VITALS
WEIGHT: 74.3 LBS | HEART RATE: 72 BPM | DIASTOLIC BLOOD PRESSURE: 71 MMHG | SYSTOLIC BLOOD PRESSURE: 158 MMHG | BODY MASS INDEX: 10.08 KG/M2 | RESPIRATION RATE: 18 BRPM | OXYGEN SATURATION: 98 % | TEMPERATURE: 98.2 F

## 2023-10-10 LAB
ANION GAP SERPL CALCULATED.3IONS-SCNC: 13 MMOL/L (ref 3–16)
BASOPHILS # BLD: 0 K/UL (ref 0–0.2)
BASOPHILS NFR BLD: 0.5 %
BUN SERPL-MCNC: 25 MG/DL (ref 7–20)
CALCIUM SERPL-MCNC: 9.3 MG/DL (ref 8.3–10.6)
CHLORIDE SERPL-SCNC: 93 MMOL/L (ref 99–110)
CO2 SERPL-SCNC: 26 MMOL/L (ref 21–32)
CREAT SERPL-MCNC: 5.2 MG/DL (ref 0.9–1.3)
DEPRECATED RDW RBC AUTO: 18.7 % (ref 12.4–15.4)
EOSINOPHIL # BLD: 0.3 K/UL (ref 0–0.6)
EOSINOPHIL NFR BLD: 5 %
GFR SERPLBLD CREATININE-BSD FMLA CKD-EPI: 12 ML/MIN/{1.73_M2}
GLUCOSE BLD-MCNC: 73 MG/DL (ref 70–99)
GLUCOSE SERPL-MCNC: 76 MG/DL (ref 70–99)
HBV SURFACE AB SERPL IA-ACNC: 3.9 MIU/ML
HBV SURFACE AG SERPL QL IA: NORMAL
HCT VFR BLD AUTO: 35.3 % (ref 40.5–52.5)
HGB BLD-MCNC: 11.8 G/DL (ref 13.5–17.5)
INR PPP: 2.81 (ref 0.84–1.16)
LYMPHOCYTES # BLD: 1.3 K/UL (ref 1–5.1)
LYMPHOCYTES NFR BLD: 23.3 %
MCH RBC QN AUTO: 36.2 PG (ref 26–34)
MCHC RBC AUTO-ENTMCNC: 33.5 G/DL (ref 31–36)
MCV RBC AUTO: 108.3 FL (ref 80–100)
MONOCYTES # BLD: 0.4 K/UL (ref 0–1.3)
MONOCYTES NFR BLD: 7.4 %
NEUTROPHILS # BLD: 3.6 K/UL (ref 1.7–7.7)
NEUTROPHILS NFR BLD: 63.8 %
PERFORMED ON: NORMAL
PLATELET # BLD AUTO: 179 K/UL (ref 135–450)
PMV BLD AUTO: 9.7 FL (ref 5–10.5)
POTASSIUM SERPL-SCNC: 4.8 MMOL/L (ref 3.5–5.1)
PROTHROMBIN TIME: 29.4 SEC (ref 11.5–14.8)
RBC # BLD AUTO: 3.26 M/UL (ref 4.2–5.9)
SODIUM SERPL-SCNC: 132 MMOL/L (ref 136–145)
WBC # BLD AUTO: 5.6 K/UL (ref 4–11)

## 2023-10-10 PROCEDURE — APPNB15 APP NON BILLABLE TIME 0-15 MINS: Performed by: NURSE PRACTITIONER

## 2023-10-10 PROCEDURE — 36415 COLL VENOUS BLD VENIPUNCTURE: CPT

## 2023-10-10 PROCEDURE — 6360000002 HC RX W HCPCS: Performed by: FAMILY MEDICINE

## 2023-10-10 PROCEDURE — 2580000003 HC RX 258: Performed by: NURSE PRACTITIONER

## 2023-10-10 PROCEDURE — 85610 PROTHROMBIN TIME: CPT

## 2023-10-10 PROCEDURE — 6370000000 HC RX 637 (ALT 250 FOR IP): Performed by: FAMILY MEDICINE

## 2023-10-10 PROCEDURE — 80048 BASIC METABOLIC PNL TOTAL CA: CPT

## 2023-10-10 PROCEDURE — 94760 N-INVAS EAR/PLS OXIMETRY 1: CPT

## 2023-10-10 PROCEDURE — 85025 COMPLETE CBC W/AUTO DIFF WBC: CPT

## 2023-10-10 PROCEDURE — 99232 SBSQ HOSP IP/OBS MODERATE 35: CPT | Performed by: INTERNAL MEDICINE

## 2023-10-10 PROCEDURE — 87205 SMEAR GRAM STAIN: CPT

## 2023-10-10 PROCEDURE — 6370000000 HC RX 637 (ALT 250 FOR IP): Performed by: STUDENT IN AN ORGANIZED HEALTH CARE EDUCATION/TRAINING PROGRAM

## 2023-10-10 PROCEDURE — 96366 THER/PROPH/DIAG IV INF ADDON: CPT

## 2023-10-10 PROCEDURE — 6360000002 HC RX W HCPCS: Performed by: NURSE PRACTITIONER

## 2023-10-10 PROCEDURE — G0378 HOSPITAL OBSERVATION PER HR: HCPCS

## 2023-10-10 PROCEDURE — 96376 TX/PRO/DX INJ SAME DRUG ADON: CPT

## 2023-10-10 PROCEDURE — 87070 CULTURE OTHR SPECIMN AEROBIC: CPT

## 2023-10-10 RX ORDER — WARFARIN SODIUM 2.5 MG/1
1.25 TABLET ORAL
Status: DISCONTINUED | OUTPATIENT
Start: 2023-10-10 | End: 2023-10-10 | Stop reason: HOSPADM

## 2023-10-10 RX ORDER — DIVALPROEX SODIUM 500 MG/1
500 TABLET, DELAYED RELEASE ORAL EVERY 12 HOURS SCHEDULED
Qty: 90 TABLET | Refills: 3 | Status: SHIPPED | OUTPATIENT
Start: 2023-10-10 | End: 2023-10-10 | Stop reason: SDUPTHER

## 2023-10-10 RX ORDER — AMOXICILLIN AND CLAVULANATE POTASSIUM 875; 125 MG/1; MG/1
1 TABLET, FILM COATED ORAL 2 TIMES DAILY
Qty: 14 TABLET | Refills: 0 | Status: SHIPPED | OUTPATIENT
Start: 2023-10-10 | End: 2023-10-10 | Stop reason: HOSPADM

## 2023-10-10 RX ORDER — DIVALPROEX SODIUM 500 MG/1
500 TABLET, DELAYED RELEASE ORAL EVERY 12 HOURS SCHEDULED
Status: DISCONTINUED | OUTPATIENT
Start: 2023-10-10 | End: 2023-10-10 | Stop reason: HOSPADM

## 2023-10-10 RX ORDER — DIVALPROEX SODIUM 500 MG/1
500 TABLET, DELAYED RELEASE ORAL EVERY 12 HOURS SCHEDULED
Qty: 60 TABLET | Refills: 0 | Status: SHIPPED | OUTPATIENT
Start: 2023-10-10 | End: 2023-11-09

## 2023-10-10 RX ORDER — DIVALPROEX SODIUM 500 MG/1
500 TABLET, DELAYED RELEASE ORAL EVERY 12 HOURS SCHEDULED
Qty: 60 TABLET | Refills: 0 | Status: SHIPPED | OUTPATIENT
Start: 2023-10-10 | End: 2023-10-10 | Stop reason: SDUPTHER

## 2023-10-10 RX ORDER — AMOXICILLIN AND CLAVULANATE POTASSIUM 500; 125 MG/1; MG/1
1 TABLET, FILM COATED ORAL 2 TIMES DAILY
Qty: 14 TABLET | Refills: 0 | Status: SHIPPED | OUTPATIENT
Start: 2023-10-10 | End: 2023-10-17

## 2023-10-10 RX ORDER — AMOXICILLIN AND CLAVULANATE POTASSIUM 500; 125 MG/1; MG/1
1 TABLET, FILM COATED ORAL 2 TIMES DAILY
Qty: 14 TABLET | Refills: 0 | Status: SHIPPED | OUTPATIENT
Start: 2023-10-10 | End: 2023-10-10 | Stop reason: SDUPTHER

## 2023-10-10 RX ADMIN — ISOSORBIDE MONONITRATE 30 MG: 30 TABLET, EXTENDED RELEASE ORAL at 08:21

## 2023-10-10 RX ADMIN — SEVELAMER CARBONATE 1600 MG: 800 TABLET, FILM COATED ORAL at 11:53

## 2023-10-10 RX ADMIN — LEVETIRACETAM 1000 MG: 10 INJECTION, SOLUTION INTRAVENOUS at 09:24

## 2023-10-10 RX ADMIN — PIPERACILLIN AND TAZOBACTAM 3375 MG: 3; .375 INJECTION, POWDER, LYOPHILIZED, FOR SOLUTION INTRAVENOUS at 12:30

## 2023-10-10 RX ADMIN — DIVALPROEX SODIUM 500 MG: 500 TABLET, DELAYED RELEASE ORAL at 10:53

## 2023-10-10 RX ADMIN — CARVEDILOL 25 MG: 25 TABLET, FILM COATED ORAL at 08:21

## 2023-10-10 RX ADMIN — PIPERACILLIN AND TAZOBACTAM 3375 MG: 3; .375 INJECTION, POWDER, LYOPHILIZED, FOR SOLUTION INTRAVENOUS at 01:39

## 2023-10-10 RX ADMIN — SEVELAMER CARBONATE 1600 MG: 800 TABLET, FILM COATED ORAL at 08:21

## 2023-10-10 RX ADMIN — LEVOTHYROXINE SODIUM 212 MCG: 0.11 TABLET ORAL at 06:08

## 2023-10-10 RX ADMIN — LINEZOLID 600 MG: 600 INJECTION, SOLUTION INTRAVENOUS at 08:18

## 2023-10-10 RX ADMIN — SACUBITRIL AND VALSARTAN 1 TABLET: 24; 26 TABLET, FILM COATED ORAL at 08:21

## 2023-10-10 ASSESSMENT — PAIN SCALES - GENERAL
PAINLEVEL_OUTOF10: 0

## 2023-10-10 NOTE — CARE COORDINATION
CASE MANAGEMENT DISCHARGE SUMMARY: Discharge order noted. Returning to home with spouse and outpatient HD. Voicemail message left for admissions at [facility] regardimg retun on 10/11/23.      DISCHARGE DATE: 10/10/2023    DISCHARGED TO: Home w/outpatient HD    Discharging Provider:  Name: [facility]  Address:  [address] [place] [address]  Phone: [phone]  Fax: [phone]                TRANSPORTATION: Spouse             TIME: Afternoon   N/A Form completed and on chart    PREFERRED PHARMACY: [address] [place] [address]             NUMBER: [phone]    INSURANCE PRECERT OBTAINED: N/A    HENS/PASAAR COMPLETED: N/A      N/A  Destination updated (SNF/HHC)    Yes  Whiteboard Note Updated with above    N/A  Home Care Order Verified     Chhaya MCFADDEN RN  Case Management  [phone]    Electronically signed by Chhaya Jean-Baptiste RN on 10/10/2023 at 2:56 PM

## 2023-10-10 NOTE — PROGRESS NOTES
4 Eyes Skin Assessment     NAME:  Luis Enrique Michael  YOB: 1965  MEDICAL RECORD NUMBER:  [id number]    The patient is being assessed for  Shift Handoff    I agree that at least one RN has performed a thorough Head to Toe Skin Assessment on the patient. ALL assessment sites listed below have been assessed. Areas assessed by both nurses:    Head, Face, Ears, Shoulders, Back, Chest, Arms, Elbows, Hands, Sacrum. Buttock, Coccyx, Ischium, Legs. Feet and Heels, and Under Medical Devices         Does the Patient have a Wound? Yes wound(s) were present on assessment.  LDA wound assessment was Initiated and completed by RN       Brayan Prevention initiated by RN: No  Wound Care Orders initiated by RN: No    Pressure Injury (Stage 3,4, Unstageable, DTI, NWPT, and Complex wounds) if present, place Wound referral order by RN under : No    New Ostomies, if present place, Ostomy referral order under : No     Nurse 1 eSignature: Electronically signed by Anita Gusman RN on 10/10/23 at 6:52 AM EDT    **SHARE this note so that the co-signing nurse can place an eSignature**    Nurse 2 eSignature: Electronically signed by Ricky Jansen RN on 10/10/23 at 9:10 AM EDT

## 2023-10-10 NOTE — CONSULTS
Neurology Consult Note  Reason for Consult: breakthrough seizure    Chief complaint: seizure    Dr Kathy Hargrove MD asked me to see Naveen Perez in consultation for evaluation of seizure. History of Present Illness:  Naveen Perez is a 62 y.o. male with past medical history of ESRD on HD, bilateral AKAs, seizure, PE on warfarin, R MCA stroke who presents with seizure . Had episode at home he describes as LUE shaking, L visual field cut, and dysarthria. Stopped on its own. Followed by LUE weakness. Called EMS and had second event in ED of similar semiology. Given versed and a keppra load. Neurology consulted for further workup. Seen by neurology team in September. Presented 9/3 after episode witnessed by wife of falling to floor followed by diffuse shaking and arm flexion. This was in setting of getting stuck in a hot garage. Confused afterwards with dysarthria. Called EMS. Wife reports additional seizure in ED that was not documented. Reported feeling an episode was coming on during HD during that stay that did not progress to full episode. MRI showed remote R MCA infarct. EEG negative. Thought to be partial seizure likely 2/2 R MCA infarct. Started keppra 250mg BID with additional 250mg after dialysis. Followed up with Ritesh Sandyfrannie from [address] [place] [address] 9/26. Reported he stopped taking keppra 2 days prior because it made him jittery. Decided to monitor off AEDs given unclear etiology of initial event. Medical History:  Past Medical History:   Diagnosis Date    Abnormal weight loss 6/25/2012    Abnormal weight loss 6/25/2012    Anemia 6/25/2012    Anger     Buerger's disease ([address])     ? ?    CAD (coronary artery disease)     CHF (congestive heart failure) (HCC)     Chronic kidney disease, stage IV (severe) ([facility])     Chronic kidney disease, stage V (HCC)     Decreased libido 6/25/2012    Depression     DJD (degenerative joint disease) of cervical spine 1/19/11    MRI - advanced disc degeneration

## 2023-10-10 NOTE — PLAN OF CARE
Problem: Discharge Planning  Goal: Discharge to home or other facility with appropriate resources  Outcome: Completed  Flowsheets (Taken 10/10/2023 0820)  Discharge to home or other facility with appropriate resources: Identify barriers to discharge with patient and caregiver     Problem: Pain  Goal: Verbalizes/displays adequate comfort level or baseline comfort level  Outcome: Completed  Flowsheets (Taken 10/10/2023 0800)  Verbalizes/displays adequate comfort level or baseline comfort level: Encourage patient to monitor pain and request assistance     Problem: Skin/Tissue Integrity  Goal: Absence of new skin breakdown  Description: 1. Monitor for areas of redness and/or skin breakdown  2. Assess vascular access sites hourly  3. Every 4-6 hours minimum:  Change oxygen saturation probe site  4. Every 4-6 hours:  If on nasal continuous positive airway pressure, respiratory therapy assess nares and determine need for appliance change or resting period.   Outcome: Completed     Problem: Safety - Adult  Goal: Free from fall injury  Outcome: Completed     Problem: Chronic Conditions and Co-morbidities  Goal: Patient's chronic conditions and co-morbidity symptoms are monitored and maintained or improved  Outcome: Completed

## 2023-10-10 NOTE — PROGRESS NOTES
Clinical Pharmacy Note  Warfarin Consult    Radha Sanchez is a 62 y.o. male receiving warfarin managed by pharmacy. Patient being bridged with none. Warfarin Indication: thrombophilia/Hx of atrial thrombus  Target INR range: 2-3   Dose prior to admission: 5mg daily except 2.5mg on Tuesday and Thursday    Current warfarin drug-drug interactions: NA    Recent Labs     10/09/23  0841 10/09/23  0925 10/10/23  0353   HGB  --  12.5* 11.8*   HCT  --  38.9* 35.3*   INR 1.86*  --  2.81*         Assessment/Plan:    INR back in goal range  Due to change in past 24 hours, will give a reduced dose of 1.25 mg tonight     Daily PT/INR until stable within therapeutic range. Thank you for the consult. Will continue to follow.

## 2023-10-10 NOTE — PROGRESS NOTES
Treatment time: 3 hours  Net UF: 1000 ml     Pre weight: 34.7 kg  Post weight:33.7 kg    Access used: LUAVG    Access function: well with  ml/min     Medications or blood products given: n/a     Regular outpatient schedule: mwf     Summary of response to treatment: Patient tolerated treatment well and without any complications. Patient remained stable throughout entire treatment and upon the RN exiting the ICU suite via transport. Report given to Rupesh Wilson RN and copy of dialysis treatment record faxed to floor to be scanned into EMR.

## 2023-10-10 NOTE — PLAN OF CARE
Problem: Discharge Planning  Goal: Discharge to home or other facility with appropriate resources  Outcome: Progressing  Flowsheets (Taken 10/9/2023 2000)  Discharge to home or other facility with appropriate resources:   Identify barriers to discharge with patient and caregiver   Identify discharge learning needs (meds, wound care, etc)     Problem: Skin/Tissue Integrity  Goal: Absence of new skin breakdown  Description: 1. Monitor for areas of redness and/or skin breakdown  2. Assess vascular access sites hourly  3. Every 4-6 hours minimum:  Change oxygen saturation probe site  4. Every 4-6 hours:  If on nasal continuous positive airway pressure, respiratory therapy assess nares and determine need for appliance change or resting period.   Outcome: Progressing     Problem: Safety - Adult  Goal: Free from fall injury  Outcome: Progressing  Flowsheets (Taken 10/9/2023 2253)  Free From Fall Injury: Instruct family/caregiver on patient safety

## 2023-10-10 NOTE — PROGRESS NOTES
Pt discharged to home with wife. VSS. Midline removed. All belongings are with Pt. Meds to bed were not delivered and retail pharmacy closed. RN sent a secure message to Duglas Morin MD to see if she can send meds to [facility] in [place] per Pt request. MD agreeable and Pt instructed on where to  meds. AVS packet reviewed with Pt and his wife, all questions answered. Foods high in K were also reviewed with Pt and his wife education materials sent home with Pt and his wife.

## 2023-10-10 NOTE — DISCHARGE INSTR - COC
YARI  April 7, 2014    Dr. Ayleen Wilkinson  March 16, 2012    Dr. Nubia Keene  October 17, 2013    Dr. Dmitriy Khan  March 19, 2014    Dr. Anne-Marie cruz bilat legs       Immunization History:   Immunization History   Administered Date(s) Administered    COVID-19, MODERNA BLUE border, Primary or Immunocompromised, (age 12y+), IM, 100 mcg/0.5mL 03/11/2021, 04/08/2021    COVID-19, PFIZER GRAY top, DO NOT Dilute, (age 15 y+), IM, 30 mcg/0.3 mL 05/16/2022    Influenza 12/23/2010, 10/01/2013    Influenza Virus Vaccine 10/31/2015    TDaP, ADACEL (age 6y-58y), [facility] (age 10y+), IM, 0.5mL 03/11/2009       Active Problems:  Patient Active Problem List   Diagnosis Code    GERD (gastroesophageal reflux disease) K21.9    Anger R45.4    Depression F32. A    Thyroid cancer ([address]) C73    S/P kidney transplant Z94.0    Memory loss R41.3    Irritability R45. 4    Abnormal weight loss R63.4    Erectile dysfunction N52.9    Decreased libido R68.82    Anemia V91.9    Folic acid deficiency N51.8    Need for SBE (subacute bacterial endocarditis) prophylaxis Z29.89    Memory loss R41. 3    Chest pain R07.9    Acute kidney injury ([address]) N17.9    Abdominal pain R10.9    Nausea R11.0    Diarrhea R19.7    Mesenteric ischemia (HCC) K55.9    Exertional angina I20.89    Nonerosive nonspecific gastritis K29.60    Gastroparesis K31.84    Paraesophageal hiatal hernia K44.9    Lower urinary tract symptoms R39.9    ESRD (end stage renal disease) on dialysis ([facility]) N18.6, Z99.2    Hand injury S69.90XA    Toe injury S99.929A    Fracture of metacarpal neck of right hand, closed S62.339A    Bilateral low back pain with sciatica M54.40    Insomnia G47.00    Sty H00.019    Essential hypertension I10    ESRD (end stage renal disease) ([facility]) N18.6    Hyperkalemia E87.5    Weakness of left side of Therapy:  Current Nutrition Therapy:   [address] ANSHUL Diet List:834090303}    Routes of Feeding: {CHP DME Other Feedings:997686982}  Liquids: {Slp liquid thickness:76966}  Daily Fluid Restriction: {CHP DME Yes amt example:229884924}  Last Modified Barium Swallow with Video (Video Swallowing Test): {Done Not Done IZSN:074848617}    Treatments at the Time of Hospital Discharge:   Respiratory Treatments: ***  Oxygen Therapy:  {Therapy; copd oxygen:16640}  Ventilator:    { CC Vent ABWT:300592794}    Rehab Therapies: {THERAPEUTIC INTERVENTION:7771363371}  Weight Bearing Status/Restrictions: { CC Weight Bearin}  Other Medical Equipment (for information only, NOT a DME order):  {EQUIPMENT:166729214}  Other Treatments: ***    Patient's personal belongings (please select all that are sent with patient):  {CHP DME Belongings:929176412}    RN SIGNATURE:  {Esignature:629670086}    CASE MANAGEMENT/SOCIAL WORK SECTION    Inpatient Status Date: 10/09/2023    Readmission Risk Assessment Score:  Readmission Risk              Risk of Unplanned Readmission:  27           Dialysis Facility (if applicable)   Name: [facility]  Address: [address] [place] [address]  Dialysis Schedule: [facility] 7:00 AM  Phone:[phone]  Fax: [phone]    / signature: Electronically signed by Indira Reed RN on 10/10/23 at 2:57 PM EDT    PHYSICIAN SECTION    Prognosis: {Prognosis:8076946493}    Condition at Discharge: [address] Patient Condition:063438340}    Rehab Potential (if transferring to Rehab): {Prognosis:9693783250}    Recommended Labs or Other Treatments After Discharge: ***    Physician Certification: I certify the above information and transfer of Josep Butcher  is necessary for the continuing treatment of the diagnosis listed and that he requires {Admit to Appropriate Level of Care:17533} for {GREATER/LESS:931087357} 30 days.      Update Admission H&P: {CHP DME Changes in

## 2023-10-10 NOTE — CARE COORDINATION
Case Management Assessment  Initial Evaluation    Date/Time of Evaluation: 10/10/2023 2:47 PM  Assessment Completed by: Joaquim Huang RN    If patient is discharged prior to next notation, then this note serves as note for discharge by case management. Patient Name: Sangeeta Nogueira                   YOB: 1965  Diagnosis: Hyperkalemia [E87.5]  Seizure ([address]) [R56.9]  Subtherapeutic international normalized ratio (INR) [R79.1]                   Date / Time: 10/9/2023  7:58 AM    Patient Admission Status: Inpatient   Readmission Risk (Low < 19, Mod (19-27), High > 27): Readmission Risk Score: 20    Current PCP: VIDHI Minor CNP  PCP verified by CM? Yes    Chart Reviewed: Yes      History Provided by: Patient  Patient Orientation: Alert and Oriented    Patient Cognition: Alert    Hospitalization in the last 30 days (Readmission):  No    If yes, Readmission Assessment in CM Navigator will be completed. Advance Directives:      Code Status: Full Code   Patient's Primary Decision Maker is: Legal Next of Kin    Primary Decision Maker: Ye Eaton - Spouse - [phone]    Secondary Decision Maker: Kristina Storm - Brother/Sister - [phone]    Discharge Planning:    Patient lives with: Spouse/Significant Other Type of Home: House  Primary Care Giver: Self  Patient Support Systems include: Spouse/Significant Other   Current Financial resources: Medicare  Current community resources: None  Current services prior to admission: Other (Comment), Durable Medical Equipment (Outpatient HD at [facility] MWF Chairtime: 7:00 AM)            Current DME: Walker            Type of Home Care services:  None    ADLS  Prior functional level: Independent in ADLs/IADLs  Current functional level: Independent in ADLs/IADLs    PT AM-PAC:   /24  OT AM-PAC:   /24    Family can provide assistance at DC:  Yes  Would you like Case Management to discuss the discharge plan with any other family

## 2023-10-12 LAB
BACTERIA SPEC RESP CULT: NORMAL
GRAM STN SPEC: NORMAL

## 2023-10-17 NOTE — DISCHARGE SUMMARY
V2.0  Discharge Summary    Name:  Shu Funes /Age/Sex: 1965 (62 y.o. male)   Admit Date: 10/9/2023  Discharge Date: 10/17/23    MRN & CSN:  [id number] & [id number] Encounter Date and Time 10/17/23 3:03 PM EDT    Attending:  No att. providers found Discharging Provider: Rupesh Meza MD       Hospital Course:     Brief HPI: Shu Funes is a 62 y.o. male who presented with altered mental status due to seizure. Brief Problem Based Course:     Seizure:  - keppra 1g IV BID started in the ED  - seizure precautions  - admit to ICU   - consult neuro:  patient prefers depakote due to side effects. Discharging on depakote 500mg BID     Left lung retrocardiac opacification:  - cefepime, flagyl, vanc started  - consult pulm:  not convinced true infection. OK for discharge with empiric augmentin     ESRD on HD:  - nephrology consulted  - normally M,W, F schedule  - cont sevelamer     Hyperkalemia;  - given insulin in the ED  - dialysis after admission from the ED     Hypoglycemia:  - resulting from insulin administration  - D10, glucose tabs     CAD hx:  - cont carvedilol, Imdur, Entresto      The patient expressed appropriate understanding of, and agreement with the discharge recommendations, medications, and plan.      Consults this admission:  IP CONSULT TO NEPHROLOGY  PHARMACY TO DOSE WARFARIN  IP CONSULT TO NEUROLOGY  IP CONSULT TO PULMONOLOGY    Discharge Diagnosis:   Seizure [facility])        Discharge Instruction:   Follow up appointments: neurology  Primary care physician: VIDHI Reyna CNP within 2 weeks  Diet: renal diet   Activity: activity as tolerated  Disposition: Discharged to:   [x]Home, []C, []SNF, []Acute Rehab, []Hospice   Condition on discharge: Stable  Labs and Tests to be Followed up as an outpatient by PCP or Specialist: F/u with outpatient seizure neurologist    Discharge Medications:        Medication List        START taking these medications      amoxicillin-clavulanate 500-125

## 2024-07-04 NOTE — PROGRESS NOTES
English      Living Arrangement: Alone      COMMENTS:               MRI OF THE BRAIN WITHOUT CONTRAST  9/5/2023 1:32 pm TECHNIQUE: Multiplanar multisequence MRI of the brain was performed without the administration of intravenous contrast. COMPARISON: None. HISTORY: ORDERING SYSTEM PROVIDED HISTORY: left sided weakness concerning for CVA TECHNOLOGIST PROVIDED HISTORY: Reason for exam:->left sided weakness concerning for CVA Decision Support Exception - unselect if not a suspected or confirmed emergency medical condition->Emergency Medical Condition (MA) Initial evaluation. FINDINGS: INTRACRANIAL STRUCTURES/VENTRICLES: There is no acute infarct. There is a moderate to large chronic infarct involving the posterior right MCA territory. There is a tiny chronic infarct involving the left posterior frontal/parietal lobe. There is no evidence of mass effect or midline shift. Areas of T2 FLAIR hyperintensity are seen in the periventricular and subcortical white matter, which are nonspecific, but may represent chronic microvascular ischemic change. There is minimal global parenchymal volume loss. AL lipoma is seen along is the corpus callosum. There is a small lipoma associated with the left choroid. The normal signal voids within the major intracranial vessels appear maintained. No acute abnormality within the sella or suprasellar cistern. ORBITS: The visualized portion of the orbits demonstrate no acute abnormality. SINUSES: The visualized paranasal sinuses and mastoid air cells demonstrate no acute abnormality. BONES/SOFT TISSUES: The bone marrow signal intensity appears normal. The soft tissues demonstrate no acute abnormality. 1. No acute intracranial abnormality. No acute infarct. 2. There is a moderate to large chronic infarct involving the posterior right MCA territory with a tiny chronic infarct involving the left posterior frontal/parietal lobe. 3. Minimal global parenchymal volume loss with minimal chronic microvascular ischemic changes.        CBC:   Recent Labs related to that system including errors in grammar, punctuation, and spelling, as well as words and phrases that may be inappropriate. If there are any questions or concerns, please feel free to contact the dictating provider for clarification.

## (undated) DEVICE — FORCEPS BX 240CM 2.4MM L NDL RAD JAW 4 M00513334

## (undated) DEVICE — SINGLE-USE POLYPECTOMY SNARE: Brand: CAPTIFLEX

## (undated) DEVICE — ENDOSCOPY KIT: Brand: MEDLINE INDUSTRIES, INC.